# Patient Record
Sex: MALE | Race: WHITE | NOT HISPANIC OR LATINO | Employment: OTHER | URBAN - METROPOLITAN AREA
[De-identification: names, ages, dates, MRNs, and addresses within clinical notes are randomized per-mention and may not be internally consistent; named-entity substitution may affect disease eponyms.]

---

## 2017-03-01 ENCOUNTER — HOSPITAL ENCOUNTER (OUTPATIENT)
Dept: RADIOLOGY | Facility: HOSPITAL | Age: 67
Discharge: HOME | End: 2017-03-01
Attending: NURSE PRACTITIONER
Payer: MEDICARE

## 2017-03-01 ENCOUNTER — OFFICE VISIT (OUTPATIENT)
Dept: FAMILY MEDICINE | Facility: CLINIC | Age: 67
End: 2017-03-01
Payer: MEDICARE

## 2017-03-01 VITALS
WEIGHT: 171.5 LBS | HEIGHT: 72 IN | BODY MASS INDEX: 23.23 KG/M2 | DIASTOLIC BLOOD PRESSURE: 78 MMHG | HEART RATE: 82 BPM | SYSTOLIC BLOOD PRESSURE: 116 MMHG

## 2017-03-01 DIAGNOSIS — R14.0 BLOATING: ICD-10-CM

## 2017-03-01 DIAGNOSIS — R14.0 BLOATING: Primary | ICD-10-CM

## 2017-03-01 DIAGNOSIS — R10.9 ABDOMINAL PAIN, UNSPECIFIED LOCATION: ICD-10-CM

## 2017-03-01 LAB
BILIRUB UR QL STRIP: NEGATIVE
CLARITY UR: CLEAR
COLOR UR: YELLOW
GLUCOSE UR QL STRIP: NEGATIVE
HGB UR QL STRIP: NEGATIVE
KETONES UR QL STRIP: NEGATIVE
LEUKOCYTE ESTERASE UR QL STRIP: NEGATIVE
NITRITE UR QL STRIP: NEGATIVE
PH UR STRIP: 6 [PH] (ref 5–8)
PROT UR QL STRIP: NEGATIVE
SP GR UR STRIP: 1.02 (ref 1–1.03)
URN SPEC COLLECT METH UR: NORMAL

## 2017-03-01 PROCEDURE — 74020 XR ABDOMEN FLAT AND ERECT: CPT | Mod: TC,PO

## 2017-03-01 PROCEDURE — 99203 OFFICE O/P NEW LOW 30 MIN: CPT | Mod: S$PBB,,, | Performed by: NURSE PRACTITIONER

## 2017-03-01 PROCEDURE — 99999 PR PBB SHADOW E&M-NEW PATIENT-LVL III: CPT | Mod: PBBFAC,,, | Performed by: NURSE PRACTITIONER

## 2017-03-01 PROCEDURE — 74020 XR ABDOMEN FLAT AND ERECT: CPT | Mod: 26,,, | Performed by: RADIOLOGY

## 2017-03-01 RX ORDER — TAMSULOSIN HYDROCHLORIDE 0.4 MG/1
0.4 CAPSULE ORAL DAILY
COMMUNITY
End: 2017-04-24

## 2017-03-01 RX ORDER — GABAPENTIN 300 MG/1
300 CAPSULE ORAL 3 TIMES DAILY
COMMUNITY

## 2017-03-01 RX ORDER — TRAMADOL HYDROCHLORIDE 50 MG/1
50 TABLET ORAL EVERY 6 HOURS PRN
COMMUNITY
End: 2017-05-25 | Stop reason: ALTCHOICE

## 2017-03-01 RX ORDER — CLONAZEPAM 0.5 MG/1
0.5 TABLET ORAL 2 TIMES DAILY PRN
COMMUNITY

## 2017-03-01 NOTE — PROGRESS NOTES
Subjective:       Patient ID: Fernando Garg is a 66 y.o. male.    Chief Complaint: Bloated (for 2 weeks,pt states it is uncomfortable )    HPI Comments: Patient is a new patient [facility] clinic. [facility]. Patient does have hemochromocytosis, last phlebotomy was about 2 weeks ago. He also takes flomax, he thinks for BPH, and has neuropathy for which he takes gabapentin.   Patient says that he has noticed significant abdominal bloating x 2 weeks. Having constipation with intermittent diarrhea, no nausea or vomiting, pain and bloating does get a little worse after eating, he has no history of any liver issues. Very mild pain 2-3/10. He has no previous history of any  gastric issues. He has had a hernia repair.  There are no preventive care reminders to display for this patient.    Past Medical History:  Past Medical History:  No date: BPH (benign prostatic hyperplasia)  No date: Hemochromatosis  No date: Neuropathy  Past Surgical History:  No date: HERNIA REPAIR  Review of patient's allergies indicates:   -- Sulfa (sulfonamide antibiotics) -- Nausea And Vomiting  No current outpatient prescriptions on file prior to visit.  No current facility-administered medications on file prior to visit.     Social History    Marital status: Single              Spouse name:                       Years of education:                 Number of children:               Occupational History    None on file    Social History Main Topics    Smoking status: Former Smoker                                                                Packs/day: 0.00      Years: 0.00           Smokeless status: Not on file                       Comment: quit many years ago    Alcohol use: No                 Comment: quit 5 months ago    Drug use: No              Sexual activity: Not on file          Other Topics            Concern    None on file    Social History Narrative    None on file      Review of  patient's family history indicates:    Breast cancer                  Mother                    Lung cancer                    Mother                    Stroke                         Father                          Review of Systems   Constitutional: Negative.  Negative for chills, diaphoresis and fever.   HENT: Negative.    Respiratory: Negative for cough and shortness of breath.    Cardiovascular: Negative for chest pain, palpitations and leg swelling.   Gastrointestinal: Positive for abdominal distention, abdominal pain, constipation and diarrhea. Negative for anal bleeding, blood in stool, nausea, rectal pain and vomiting.   Genitourinary: Positive for frequency. Negative for dysuria and genital sores.   Musculoskeletal: Negative for arthralgias and back pain.   Skin: Positive for color change. Negative for rash.   Neurological: Negative for dizziness, light-headedness and headaches.       Objective:      Physical Exam   Constitutional: He is oriented to person, place, and time. No distress.   HENT:   Head: Normocephalic and atraumatic.   Eyes: Pupils are equal, round, and reactive to light.   Neck: Normal range of motion.   Cardiovascular: Normal rate and regular rhythm.  Exam reveals no friction rub.    No murmur heard.  Pulmonary/Chest: Effort normal and breath sounds normal. No respiratory distress. He has no wheezes.   Abdominal: He exhibits distension. There is no tenderness.   Musculoskeletal: He exhibits no edema.   Neurological: He is alert and oriented to person, place, and time.   Skin: He is not diaphoretic. No erythema.        Psychiatric: He has a normal mood and affect. His behavior is normal.   Vitals reviewed.      Assessment:       1. Bloating    2. Abdominal pain, unspecified location        Plan:     Patient appears mildly jaundiced. He says he does not notice a difference in his color. SBO vs Liver disorder, possibly relating to hemochromocytosis. Labs, xray, and us today to evaluate  symptoms.   1. Bloating    - CBC auto differential; Future  - Comprehensive metabolic panel; Future  - Amylase; Future  - Lipase; Future  - X-Ray Abdomen Flat And Erect; Future  - US Abdomen Complete; Future  - Urinalysis    2. Abdominal pain, unspecified location    - CBC auto differential; Future  - Comprehensive metabolic panel; Future  - Amylase; Future  - Lipase; Future  - X-Ray Abdomen Flat And Erect; Future  - US Abdomen Complete; Future  - Urinalysis

## 2017-03-06 ENCOUNTER — HOSPITAL ENCOUNTER (OUTPATIENT)
Dept: RADIOLOGY | Facility: HOSPITAL | Age: 67
Discharge: HOME | End: 2017-03-06
Attending: NURSE PRACTITIONER
Payer: MEDICARE

## 2017-03-06 DIAGNOSIS — K74.60 CIRRHOSIS OF LIVER WITH ASCITES, UNSPECIFIED HEPATIC CIRRHOSIS TYPE: ICD-10-CM

## 2017-03-06 DIAGNOSIS — R18.8 CIRRHOSIS OF LIVER WITH ASCITES, UNSPECIFIED HEPATIC CIRRHOSIS TYPE: ICD-10-CM

## 2017-03-06 DIAGNOSIS — R10.9 ABDOMINAL PAIN, UNSPECIFIED LOCATION: ICD-10-CM

## 2017-03-06 DIAGNOSIS — R18.8 OTHER ASCITES: Primary | ICD-10-CM

## 2017-03-06 DIAGNOSIS — R14.0 BLOATING: ICD-10-CM

## 2017-03-06 PROCEDURE — 76700 US EXAM ABDOM COMPLETE: CPT | Mod: TC,PO

## 2017-03-06 PROCEDURE — 76700 US EXAM ABDOM COMPLETE: CPT | Mod: 26,,, | Performed by: RADIOLOGY

## 2017-03-07 ENCOUNTER — DOCUMENTATION ONLY (OUTPATIENT)
Dept: TRANSPLANT | Facility: CLINIC | Age: 67
End: 2017-03-07

## 2017-03-07 NOTE — LETTER
March 7, 2017    Soheila Loo, [place]  [address] [facility] [address]      Dear Dr. Loo    Patient: Fernando Garg   MR Number: [id number]   YOB: 1950     Thank you for the referral of Fernando Garg to the [facility]. An initial appointment will be scheduled for your patient with one of our Hepatologists.      Thank you again for your trust in our program.  If there is anything we can do for you or your staff, please feel free to contact us.        Sincerely,        [facility]  [address] [place] [id number]  [phone]

## 2017-03-07 NOTE — LETTER
March 7, 2017    CJ Garg  [address] [place] [address]      Dear CJ Garg:    Your doctor has referred you to the [facility]. You will be contacted by our office and an initial appointment will then be scheduled for you.    We look forward to seeing you soon. If you have any further questions, please contact us at [phone].       Sincerely,        [facility]   [address] [place] [id number]  [phone]

## 2017-03-07 NOTE — NURSING
Pt records reviewed.   Pt will be referred to Hepatology.    Initial referral received  from the workque.   Referring Provider/diagnosis      Soheila Loo NP    Diagnosis: Other ascites  Cirrhosis of liver with ascites, unspecified hepatic cirrhosis type   [facility]. [facility]      Referral letter sent to provider and patient.

## 2017-03-08 ENCOUNTER — TELEPHONE (OUTPATIENT)
Dept: HEPATOLOGY | Facility: CLINIC | Age: 67
End: 2017-03-08

## 2017-03-08 ENCOUNTER — HOSPITAL ENCOUNTER (OUTPATIENT)
Dept: RADIOLOGY | Facility: HOSPITAL | Age: 67
Discharge: HOME | End: 2017-03-08
Attending: NURSE PRACTITIONER
Payer: MEDICARE

## 2017-03-08 ENCOUNTER — TELEPHONE (OUTPATIENT)
Dept: INTERVENTIONAL RADIOLOGY/VASCULAR | Facility: HOSPITAL | Age: 67
End: 2017-03-08

## 2017-03-08 ENCOUNTER — OFFICE VISIT (OUTPATIENT)
Dept: HEPATOLOGY | Facility: CLINIC | Age: 67
End: 2017-03-08
Payer: MEDICARE

## 2017-03-08 VITALS
TEMPERATURE: 98 F | HEIGHT: 72 IN | HEART RATE: 81 BPM | RESPIRATION RATE: 18 BRPM | SYSTOLIC BLOOD PRESSURE: 142 MMHG | WEIGHT: 167.13 LBS | DIASTOLIC BLOOD PRESSURE: 83 MMHG | OXYGEN SATURATION: 100 % | BODY MASS INDEX: 22.64 KG/M2

## 2017-03-08 DIAGNOSIS — D61.818 PANCYTOPENIA: ICD-10-CM

## 2017-03-08 DIAGNOSIS — K74.60 CIRRHOSIS OF LIVER WITHOUT ASCITES, UNSPECIFIED HEPATIC CIRRHOSIS TYPE: ICD-10-CM

## 2017-03-08 DIAGNOSIS — R16.1 SPLENOMEGALY: ICD-10-CM

## 2017-03-08 DIAGNOSIS — R19.7 DIARRHEA, UNSPECIFIED TYPE: ICD-10-CM

## 2017-03-08 DIAGNOSIS — E83.119 HEMOCHROMATOSIS, UNSPECIFIED HEMOCHROMATOSIS TYPE: ICD-10-CM

## 2017-03-08 DIAGNOSIS — K74.60 CIRRHOSIS OF LIVER WITH ASCITES, UNSPECIFIED HEPATIC CIRRHOSIS TYPE: ICD-10-CM

## 2017-03-08 DIAGNOSIS — R18.8 CIRRHOSIS OF LIVER WITH ASCITES, UNSPECIFIED HEPATIC CIRRHOSIS TYPE: ICD-10-CM

## 2017-03-08 DIAGNOSIS — R17 SERUM TOTAL BILIRUBIN ELEVATED: ICD-10-CM

## 2017-03-08 DIAGNOSIS — R18.8 OTHER ASCITES: Primary | ICD-10-CM

## 2017-03-08 DIAGNOSIS — R18.8 OTHER ASCITES: ICD-10-CM

## 2017-03-08 DIAGNOSIS — D69.6 THROMBOCYTOPENIA: ICD-10-CM

## 2017-03-08 PROCEDURE — 74160 CT ABDOMEN W/CONTRAST: CPT | Mod: TC

## 2017-03-08 PROCEDURE — 99215 OFFICE O/P EST HI 40 MIN: CPT | Mod: S$PBB,,, | Performed by: NURSE PRACTITIONER

## 2017-03-08 PROCEDURE — 74160 CT ABDOMEN W/CONTRAST: CPT | Mod: 26,GC,, | Performed by: RADIOLOGY

## 2017-03-08 PROCEDURE — 99999 PR PBB SHADOW E&M-EST. PATIENT-LVL IV: CPT | Mod: PBBFAC,,, | Performed by: NURSE PRACTITIONER

## 2017-03-08 PROCEDURE — 25500020 PHARM REV CODE 255: Performed by: NURSE PRACTITIONER

## 2017-03-08 RX ADMIN — IOHEXOL 75 ML: 350 INJECTION, SOLUTION INTRAVENOUS at 02:03

## 2017-03-08 NOTE — NURSING
3/8/17@4275 pre op call with pt's significant other, Kenia Mac, instructed to inform pt to eat a light breakfast and take regularly scheduled meds, Ms. Mac acknowledged understanding and states she will accompany pt in the am for procedure

## 2017-03-08 NOTE — PROGRESS NOTES
[facility] VISIT NEW PT NOTE    REFERRING PROVIDER: Soheila Loo NP    CHIEF COMPLAINT: abnormal liver imaging    HPI: This is a 66 y.o. White male with PMH of BPH, neuropathy, and hemochromatosis referred for abnormal findings on recent u/s that are concerning for cirrhosis. [facility] but went to primary care recently on the [facility] for f/o abd bloating and pain. He had u/s that revealed findings of cirrhosis with nodular liver, cirrhosis, enlarged portal vein, and splenomegaly. Ascites noted. He had labs also done that showed elevated Tbili of 2.5, AST 60, ALT 28. Pancytopenia on CBC. Plts low at 84. He has been referred for further evaluation. He is here alone today. He reports his hemochromatosis was diagnosed 3-[facility]rs [facility] and has been managed by a hematologist. He has phlebotomy Q 2 weeks, last yesterday. Thinks ferritin was 1300. Got down to 900 at one time. He denies any family h/o hemochromatosis. Father  from a stroke and mother had lung and breast cancer. He does have one sister. She has not been tested for this.     He reports he [facility] and was told he had a fatty liver but never had any liver biopsy or other workup done. He does report h/o alcohol use in the past, 3-4 beers a few times a week at the most. Stopped drinking a year ago. He denies any h/o daily or heavy alcohol use.     He recalls swelling started 3-4 weeks ago. He has a niece that was diagnosed with C-diff after they had a family get together. He had a little diarrhea but then this resolved. He thinks swelling started around the same time. He has not had stool tested. He feels well. Denies jaundice, dark urine, hematemesis, melena, slowed mentation. Weight has been stable. Energy level is good. He feels well overall and is active. Still does construction projects around the house.        Review of patient's allergies indicates:   Allergen Reactions    Sulfa (sulfonamide  antibiotics) Nausea And Vomiting       Medications reviewed in Epic    PMHX:  has a past medical history of BPH (benign prostatic hyperplasia); Hemochromatosis; and Neuropathy.    PSHX:  has a past surgical history that includes Hernia repair and Colonoscopy (2012).    FAMILY HISTORY: Negative for liver disease, reviewed in [facility]    SOCIAL HISTORY:   History   Smoking Status    Former Smoker   Smokeless Tobacco    Not on file     Comment: quit many years ago       History   Alcohol Use No     Comment: quit 5 months ago       History   Drug Use No     He has a long term girlfriend of 20 yrs. They are planning to get  on a cruise to [place] they are taking in May. 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    ROS:   GENERAL: Denies fever, chills, weight loss/gain, fatigue  HEENT: Denies headaches, dizziness, vision/hearing changes, (+) hearing loss  CARDIOVASCULAR: Denies chest pain, palpitations, or edema  RESPIRATORY: Denies dyspnea, cough  GI: Denies abdominal pain, rectal bleeding, nausea, vomiting. No change in bowel pattern or color  : Denies dysuria, hematuria   SKIN: Denies rash, itching   NEURO: Denies confusion, memory loss, or mood changes  PSYCH: Denies depression or anxiety  HEME/LYMPH: Denies easy bruising or bleeding        PHYSICAL EXAM:   Friendly White male, in no acute distress; alert and oriented to person, place and time  VITALS: BP (!) 142/83 (BP Location: Left arm, Patient Position: Sitting, BP Method: Automatic)  Pulse 81  Temp 97.7 °F (36.5 °C)  Resp 18  Ht 6' (1.829 m)  Wt 75.8 kg (167 lb 1.7 oz)  SpO2 100%  BMI 22.66 kg/m2  HENT: Normocephalic, without obvious abnormality. Oral mucosa pink and moist. Dentition good.  EYES: Sclerae mildly icteric. No conjunctival pallor.   NECK: Supple. No masses or cervical adenopathy.  CARDIOVASCULAR: Regular rate and rhythm. No murmurs.  RESPIRATORY: Normal respiratory effort. BBS CTA. No wheezes or crackles.  GI: Soft,  non-tender, mildly distended. (+) hepatosplenomegaly. No masses palpable. (+) ascites.  EXTREMITIES:  No clubbing, cyanosis or edema.  SKIN: Warm and dry. No jaundice. (+) dark skin tone. No rashes noted to exposed skin. No telangectasias noted. (+) palmar erythema.  NEURO:  Normal gate. No asterixis.  PSYCH:  Memory intact. Thought and speech pattern appropriate. Behavior normal. No depression or anxiety noted.      RECENT LABS:    Labs:  Lab Results   Component Value Date    WBC 2.75 (L) 03/01/2017    HGB 13.8 (L) 03/01/2017    HCT 39.0 (L) 03/01/2017    PLT 84 (L) 03/01/2017     (L) 03/01/2017    K 4.1 03/01/2017    CREATININE 0.9 03/01/2017    ALT 28 03/01/2017    AST 60 (H) 03/01/2017    ALKPHOS 103 03/01/2017    BILITOT 2.5 (H) 03/01/2017    ALBUMIN 3.0 (L) 03/01/2017         DIAGNOSTIC STUDIES:  EGD- none recently, last 5 yrs ago    [facility] no [facility]ps per pt, repeat in 10 yrs advised    ABD. U/S- 3/6/17  Findings:    The pancreas is obscured by bowel gas.  The proximal and mid abdominal aorta are obscured by bowel gas.  The distal infrarenal abdominal aorta is nonaneurysmal.    The liver is normal in size but shows a coarse parenchymal echotexture and a micronodular contour suggesting intrinsic liver disease such as hepatic cirrhosis.  Please correlate clinically.  No discrete hepatic mass appreciated.  The portal vein is patent and shows normal directional flow.  The portal vein is enlarged measuring up to 16 mm in maximal dimension suggesting portal venous hypertension.  There is a moderate volume of intra-abdominal ascites present, most pronounced about the liver area in this likely explains diffuse gallbladder wall thickening.  No sonographic Whiteside's sign.  No shadowing gallstones.  There is gallbladder sludge present within the gallbladder lumen.  No intra-or extrahepatic biliary dilatation, and the common bile duct measures 5 mm.  The kidneys are unremarkable.  The spleen is at  the upper limits of normal in size measuring up to 12.6 cm in maximal dimension.   Impression       1.  Sonographic findings which are concerning for hepatic cirrhosis and portal venous hypertension as detailed above.  No discrete hepatic mass appreciated.  Please correlate with liver function tests and consider consultation with a hepatologist.    2.  Ascites         ASSESSMENT:  66 y.o. White male with:  1.  Cirrhosis, decompensated with ascites, diagnosed based on imaging and lab findings of nodular liver, splenomegaly, ascites, elevated Tbili, thrombocytopenia  -- MELD - unable to calculate, no INR  -- HCC screening- AFP and abd. U/S - no mass on u/s, no AFP to review  -- Immunity to Hep A and B- unknown  -- EGD - none recently  2. Hemochromatosis  -- [facility] Q 2 weeks  -- managed by hematologist there  3. Ascites, new onset  -- will get diagnostic and therapeutic paracentesis and start diuretics  4. Possible exposure to C-diff  -- will get stool sample  5. Pancytopenia      EDUCATION:   The disease process and manifestations of cirrhosis were discussed.    Signs and symptoms of hepatic decompensation were reviewed, including jaundice, ascites, and slowed mentation due to hepatic encephalopathy. The patient should seek medical attention if any of these things occur.  We discussed the potential for bleeding from esophageal varices with symptoms of hematemesis and melena. The patient should report to the Emergency Department for these symptoms.    We discussed the increased risk of hepatocellular carcinoma due to cirrhosis. Continued screening every six months with ultrasound and AFP is recommended.     No alcohol or raw seafood.  Tylenol/acetaminophen as needed for pain, up to 2000 mg daily    Discussed that hemochromatosis can cause cirrhosis and that is likely the cause of his cirrhosis. He may need to be evaluated for liver transplant pending labs today.        PLAN:  1. Labs today for full serologic workup, MELD, HH DNA analysis  2. Paracentesis, diagnostic and therapeutic  3. Will start diuretics after he has para tomorrow  4. Low salt diet, < 2000 mg daily  5. EGD to screen for varices  6. [facility]. Will take this over soon  7. TPCT scan to ensure no HCC present  8. HCC screening Q 6 months with AFP and abd. U/S   9. Continue to avoid alcohol   10. Tylenol/acetaminophen as needed for pain, up to 2000 mg daily, Avoid NSAIDS   11. May need to be referred for transplant evaluation if MELD > 15  12. Follow up in 4 weeks      Thank you for allowing me to participate in the care of Fernando Garg    Nasima Ro, NP-C    Total duration of visit = 75 min, with > 50% spent counseling     CC: Dr. Kawasaki, PCP

## 2017-03-08 NOTE — MR AVS SNAPSHOT
[facility] - Hepatology  [address]  [facility] [address]  Phone: [phone]  Fax: [phone]                  Fernando Garg   3/8/2017 8:40 AM   Office Visit    Description:  Male : 1950   Provider:  Nasima Ro NP   Department:  [facility] Hepat[facility]           Reason for Visit     Abnormal Abdominal/Liver Imaging           Diagnoses this Visit        Comments    Other ascites    -  Primary     Cirrhosis of liver without ascites, unspecified hepatic cirrhosis type         Diarrhea, unspecified type                To Do List           Future Appointments        Provider Department Dept Phone    3/9/2017 8:00 AM [id number] [phone]      Goals (5 Years of Data)     None      [facility]     [facility] Care Line -  Assistance  Registered nurses in the [facility] provide clinical advisement, health education, appointment booking, and other advisory services.  Call for this free service at [phone].             Medications           Message regarding Medications     Verify the changes and/or additions to your medication regime listed below are the same as discussed with your clinician today.  If any of these changes or additions are incorrect, please notify your healthcare provider.             Verify that the below list of medications is an accurate representation of the medications you are currently taking.  If none reported, the list may be blank. If incorrect, please contact your healthcare provider. Carry this list with you in case of emergency.           Current Medications     gabapentin (NEURONTIN) 300 MG capsule Take 300 mg by mouth 3 (three) times daily.    tramadol (ULTRAM) 50 mg tablet Take 50 mg by mouth every 6 (six) hours as needed for Pain.    clonazePAM (KLONOPIN) 0.5 MG tablet Take 0.5 mg by mouth 2 (two) times daily as needed for Anxiety.    tamsulosin (FLOMAX) 0.4 mg Cp24 Take 0.4 mg by  mouth once daily.           Clinical Reference Information           Your Vitals Were     BP Pulse Temp Resp Height Weight    142/83 (BP Location: Left arm, Patient Position: Sitting, BP Method: Automatic) 81 97.7 °F (36.5 °C) 18 6' (1.829 m) 75.8 kg (167 lb 1.7 oz)    SpO2 BMI             100% 22.66 kg/m2         Blood Pressure          Most Recent Value    BP  (!)  142/83      Allergies as of 3/8/2017     Sulfa (Sulfonamide Antibiotics)      Immunizations Administered on Date of Encounter - 3/8/2017     None      Orders Placed During Today's Visit      Normal Orders This Visit    Culture, Body Fluid (Aerobic) w/ GS     Cytology Specimen-Medical Cytology (Fluid/[place]ash/[place])     Future Labs/Procedures Expected by Expires    Albumin, Peritoneal, Pleural Fluid or VICKY Drainage, In-House Ascites  3/8/2017 5/7/2018    TUAN  3/8/2017 5/7/2018    Clostridium difficile EIA  3/8/2017 5/7/2018    Hepatitis A antibody, IgG  3/8/2017 5/7/2018    Hepatitis C antibody  3/8/2017 5/7/2018    IR Paracentesis with Imaging  3/8/2017 3/8/2018    Phosphatidylethanol (PETH)  3/8/2017 5/7/2018    Protein, Peritoneal, Pleural Fluid or VICKY Drainage, In-House Ascites  3/8/2017 5/7/2018    WBC & Diff,Body Fluid Ascites  3/8/2017 5/7/2018    AFP tumor marker  As directed 3/8/2018    Alpha 1 Antitrypsin Phenotype  As directed 3/8/2018    Anti-smooth muscle antibody  As directed 3/8/2018    Antimitochondrial antibody  As directed 3/8/2018    CBC auto differential  As directed 3/8/2018    Ceruloplasmin  As directed 3/8/2018    Comprehensive metabolic panel  As directed 3/8/2018    CT Abdomen With Without Contrast  As directed 3/8/2018    Ferritin  As directed 3/8/2018    Hemochromatosis DNA Analysis (PCR)  As directed 3/8/2018    Hepatitis B core antibody, total  As directed 3/8/2018    Hepatitis B surface antibody  As directed 3/8/2018    Hepatitis B surface antigen  As directed 3/8/2018    IgG  As directed 3/8/2018    IgM  As directed 3/8/2018     Iron and TIBC  As directed 3/8/2018    Protime-INR  As directed 3/8/2018      [facility] Sign-Up     Activating your [facility] account is as easy as 1-2-3!     1) Visit [email or web address], select Sign Up Now, enter this activation code and your date of birth, then select Next.  [email or web address]  Expires: 4/22/2017  9:50 AM      2) Create a username and password to use when you visit [facility] in the future and select a security question in case you lose your password and select Next.    3) Enter your e-mail address and click Sign Up!    Additional Information  If you have questions, please e-mail [email or web address] or call [phone] to talk to our [facility] staff. Remember, [facility] is NOT to be used for urgent needs. For medical emergencies, dial 911.         Instructions    1. Labs today  2. Paracentesis to drain fluid  3. Will start fluid pills to help with fluid also  4. Low salt diet, < 2000 mg daily  5. EGD to check for varices, call [phone] to schedule  6. [facility]  [facility] scan  8. Will need liver cancer screenings every 6 months  9. No alcohol or raw seafood.   10. Tylenol/acetaminophen as needed for pain, up to 2000 mg daily   11. Follow up in 4 weeks      Cirrhosis    The liver is found on the right side of your abdomen, just below the rib cage. The liver has many essential functions. Among these, it filters toxins from the blood. It also helps blood clot to stop bleeding. Cirrhosis results from scarring and injury to the liver. This damage is permanent. It can lead to loss of liver function. At some point, the liver may stop working (liver failure).   Long-term heavy alcohol use and having hepatitis B or C are the two most common causes of cirrhosis. Other things that can damage the liver include toxins, certain medicines, and certain viruses.  Common symptoms of cirrhosis include:  · Tiredness, weakness  · Loss of appetite  · Nausea and vomiting  · Easy bleeding and  bruising  · Abdominal swelling  · Weight loss  · Jaundice  · Itching  · Confusion  Treatment is aimed at managing symptoms and preventing further liver damage. Treatments may be given to fight the hepatitis virus. Quitting alcohol will help slow the progress of the disease and may prevent further complications. If cirrhosis progresses and becomes life threatening, a liver transplant may be an option in some cases.   Home care  · Avoid medicines that can worsen liver damage.  Your healthcare provider will explain if any of the medicines you now take need to be changed. Talk to you healthcare provider before taking any medicine, including mineral and vitamin supplements or herbs. Certain substances can worsen liver damage.  · Talk to your healthcare provider avoiding medicines containing acetaminophen or NSAIDs (such as ibuprofen and naproxen). These can affect your liver.   · Stop drinking alcohol. If you are dependent on alcohol or find it hard to stop drinking, seek professional help. Consider joining Alcoholics Anonymous or another type of treatment program for support.  · If you use IV drugs, you are at high risk for hepatitis B and C. Seek help to stop.   Follow-up care  Follow up with your healthcare provider or as advised by our staff.  For more information and to learn about support groups for people with liver disease, contact:  · American Liver Foundation  www.liverfoundation.org  769.270.2102  · Hepatitis Foundation International  www.hepfi.org  663- 368-4196  When to seek medical advice  Call your healthcare provider for any of the following:  · Rapid weight gain with increased size of your abdomen or leg swelling  · Increasing jaundice (yellow color of skin or eyes)  · Excess bleeding from cuts or injuries  Date Last Reviewed: 6/22/2015 © 2000-2016 [employer]. [address] [place] [address], [place] [address]. All rights reserved. This information is not intended as a substitute for professional  medical care. Always follow your healthcare professional's instructions.        Treating Cirrhosis  Cirrhosis is a condition where the liver is damaged. Scar tissue slowly replaces healthy tissue. Treatment can control or slow liver scarring. Follow your healthcare providers instructions closely to get the most out of your treatment. And ask your family and friends for support.  Making a treatment plan  You and your healthcare provider will decide on a treatment plan thats best for you. The plan may include one or more of the following:  · Avoiding alcohol. Heavy alcohol use can damage the liver. Once the liver is damaged, even a small amount of alcohol can cause problems. You can slow down the progression of cirrhosis if you stop all alcohol use.   · Medicines. These may be given to treat some causes of cirrhosis, such as infection or a bile duct blockage. If needed, medicine may be used to improve blood clotting. And medicine can be given if your immune system is attacking the liver or bile ducts.  · Other medicines should be avoided in cirrhosis. These are NSAIDs, such as ibuprofen, naproxen, and similar. They can hurt the kidney in cirrhosis.   · Treating symptoms. Cirrhosis can cause swelling in the stomach and legs. A low-salt diet can help relieve this symptom. So can taking water pills (diuretics).  · Eating healthy foods  · Losing excess weight. If you have metabolic problems like being overweight, diabetes, high blood pressure, or high cholesterol and triglycerides, if you improve those diseases, you can also slow down the progression of cirrhosis. Exercise is very important.   · Removal of iron from the blood to decrease iron levels in liver tissue if these levels are high.  Severe cases of cirrhosis may need special treatments. Your healthcare provider can discuss them with you.  Avoiding alcohol  Alcohol use can destroy liver cells. If you have problems quitting alcohol, get the support you need.  Your healthcare provider may be able to suggest local groups that can help you stop drinking alcohol.   Date Last Reviewed: 6/1/2016 © 2000-2016 [employer]. [address] [place] [address], [place] [address]. All rights reserved. This information is not intended as a substitute for professional medical care. Always follow your healthcare professional's instructions.        Understanding Cirrhosis    Cirrhosis is a chronic (lifelong) liver problem. It results from damaged and scarred liver tissue. Cirrhosis cant be cured. But it can be treated. Your healthcare provider can tell you more.  The liver  The liver is a large organ in the upper right part of the belly. A healthy liver metabolizes proteins, carbohydrates, and fats. It makes a digestive fluid called bile and removes toxins from the blood. The liver is also involved in the blood-clotting process.  When you have cirrhosis  When you have cirrhosis, your liver becomes damaged and scarred. The liver doesnt function as it should. In some cases, cirrhosis can lead to liver failure. If it does, your healthcare provider will tell you whether you may need a liver transplant. You can slow down the progression of cirrhosis if you stop all alcohol use. Also, if you have metabolic problems like being overweight, diabetes, high blood pressure, or high cholesterol and triglycerides, you can also slow down the progression of cirrhosis by trying to improve those diseases.   Causes of cirrhosis  Cirrhosis causes include the following:  · Alcohol use  · Viral liver infections, such as hepatitis  · Chronic bile duct blockage  · Certain inherited diseases that can result in too much copper or iron being stored in the liver  · Certain medicines  · Nonalcoholic fatty liver disease  · Autoimmune disease  Common signs and symptoms  Typical cirrhosis signs and symptoms include the following:  · Fatigue, weakness, and lack of appetite  · Vomiting with or without  blood  · Weight loss or weight gain  · Yellowish skin and eyes (jaundice)  · Itching  · Swollen belly and legs  · Intestinal bleeding  · Easy bruising of the skin  · Dilated veins in the esophagus and stomach  · Poor mental function  Date Last Reviewed: 6/1/2016 © 2000-2016 [employer]. [address] [place] [address]. All rights reserved. This information is not intended as a substitute for professional medical care. Always follow your healthcare professional's instructions.        Ascites    Ascites is fluid collecting in the abdomen (stomach area). Symptoms include swelling of the abdomen and a feeling of pressure. Shortness of breath may also occur. In severe cases, the feet, ankles and legs may also swell.   There are many causes of ascites. The most common are related to the liver. They include:  · Long-term alcohol abuse  · Hepatitis  · Diseases such as congestive heart failure, kidney failure, pancreatitis, or cancer  To treat the condition, a low-salt diet may be recommended. Medicines that help fluid leave the body (diuretics) may be prescribed. In some cases, a procedure is done to drain the abdomen of fluid. This is called paracentesis. Unless the underlying cause is treated, the fluid is likely to return.  If liver damage is due to alcohol, stopping all alcohol will help slow the progress of the disease. If liver damage is from hepatitis B or C, treatments may be given to fight the virus. If liver damage becomes life threatening, a liver transplant may be needed.  Home care  · Certain medicines can worsen liver damage. Talk to your healthcare provider or pharmacist about any medicines you currently take. Ask your healthcare provider or pharmacist before taking any new medicines. Also ask before taking herbs, vitamins, or minerals. Certain ones affect the liver.  · Do not taking acetaminophen or ibuprofen without taking to your healthcare provider first. Both can affect your  liver.   · Stop all alcohol use. If you abuse alcohol, talk to your healthcare provider about getting help and support to stop.   · If you use IV drugs, seek help to stop. Never share needles or other equipment.    Follow-up care  Follow up with your healthcare provider as advised. If a culture was done, call as directed for the results. Depending on the results, your treatment may change.  The following sources can tell you more about ascites and help you find support.  · American Liver Foundation 978-211-0097 www.liverfoundation.org  · Hepatitis Foundation International www.hepfi.org  · Alcoholics Anonymous www.aa.org  · National [place] on Alcoholism and Drug Dependence 694-527-6942 www.ncadd.org  When to seek medical advice  Call your healthcare provider right away if you have any of the following:  · Sudden weight gain with increased size of your abdomen or leg swelling  · Increasing jaundice (yellowing of skin or eyes)  · Excess bleeding from cuts or injuries  · Blood in vomit or stool (black or red color)  · Trouble breathing  · Increasing abdominal pain  · Fever of 100.4ºF (38ºC) or higher, or as directed by your healthcare provider  Date Last Reviewed: 6/16/2015  © 9852-5946 [employer]. [address] [place] [address], [place] [address]. All rights reserved. This information is not intended as a substitute for professional medical care. Always follow your healthcare professional's instructions.        Esophageal Varices     With esophageal varices, blood vessels in the esophagus become abnormally enlarged. They may then burst (rupture) and bleed.   Esophageal varices are enlarged veins at the lower end of the esophagus. The esophagus is the tube that carries food from your mouth to your stomach. Varices most often occur because of problems with blood flow in the liver caused by chronic liver disease. Normally, a blood vessel called the portal vein carries blood from the digestive organs to the liver.  But with liver disease, blood flow can be blocked due to scarring of the liver. This increases the blood pressure in the portal vein (a condition known as portal hypertension). Blood then backs up in nearby veins in the esophagus and stomach, causing varices. Varices are a serious and deadly problem. Treatment is needed to prevent them from bursting (rupturing) and bleeding. If bleeding occurs, it can be fatal.  Symptoms of esophageal varices  Symptoms do not occur unless the varices are bleeding. This is an emergency problem. If you have any of the following symptoms, get medical attention right away:  · Vomiting blood  · Black, tarry, or bloody stools  · Feeling lightheaded, or fainting (loss of consciousness)  Diagnosing esophageal \varices  Youll likely be checked for varices if you have liver disease or other health problems that can cause them. Your healthcare provider will ask about your symptoms and health history. Youll also be examined. Tests are then done to confirm the problem. Tests can include:  · Upper endoscopy. This is done to see inside the upper digestive tract. During the test, an endoscope is used. This is a thin, flexible tube with a tiny camera on the end. Its inserted through your mouth. Its then guided down through your esophagus, stomach, and first part of your small intestine. This allows the provider to check for varices and find any bleeding.  · Imaging tests. These provide pictures of the liver or blood flow in the liver. They allow the provider to check for enlarged veins around the liver and assess the risk of bleeding. Common imaging tests done include ultrasound and CT scans.  Treating esophageal varices  The goal of treatment is to reduce the risk of bleeding or to control bleeding. Treatment can include 1 or more of the following:  · Medicines. These may be prescribed to lower the blood pressure inside the enlarged veins. This reduces the risk of bleeding. Beta-blockers are  the most common medicine used.  · Endoscopic therapy. These are treatments for enlarged or bleeding veins that are done using an endoscope. With ligation, small rubber bands are placed around the veins to close them off and stop any bleeding. With sclerotherapy, a blood-clotting medicine is injected into the veins to cause scarring and shrink them.  · Balloon tamponade. A tube with a balloon is guided down into your esophagus and stomach. The balloon is then filled with air. This puts pressure on enlarged or bleeding veins to control bleeding. This is a short-term (temporary) way to control bleeding until other treatments are available.   · Surgery. This may be done to place a tubelike device (stent) in the liver. The stent helps redirect blood flow in the liver to lower the blood pressure in enlarged veins. Sometimes, the enlarged veins may be connected to other nearby veins to redirect blood flow. In severe cases, a liver transplant may be needed. For this surgery, a diseased liver is replaced with a healthy liver from another person.   Follow-up  Regular visits with your provider are needed to check for bleeding of the varices. If bleeding occurs, it is likely to occur again. More treatments will then be needed in the future. Once endoscopic therapy (banding) is performed, regular follow-up endoscopic scans with banding are done to completely get rid of the varices. If you are given medicines to take by mouth, be sure to take them as directed. Work closely with your provider to manage your condition. Know when to seek emergency care.  Date Last Reviewed: 7/1/2016  © 1242-9780 [employer]. [address] [place] [address], [place] [address]. All rights reserved. This information is not intended as a substitute for professional medical care. Always follow your healthcare professional's instructions.        Discharge Instructions for Hereditary Hemochromatosis  You have been diagnosed with hereditary  hemochromatosis (HH). This is an inherited disease that causes you to soak up too much iron. Iron is needed for making red blood cells. But too much of it can cause serious health problems. Here's what you need to know.  Home care  · Tell your children and your brothers and sisters that you have hemochromatosis. The disease is inherited, so other family members may have it and not know it. Your first degree family members should talk to their health care provider about the need for blood testing.  · Have your iron levels checked regularly.  · Avoid drinking alcohol. If you have trouble quitting, ask your health care provider about programs to help you.  · Avoid eating large quantities of iron-rich foods, such as red meats (especially liver) and food products with iron added.  · Dont eat raw fish or raw shellfish.  · Never take iron supplements. Even small amounts of iron in some multivitamins can be harmful.  · Dont take pills with more than 500 mg of vitamin C each day. Its OK to eat foods that have vitamin C.  Follow-up  · Make a follow-up appointment.  · Keep your follow-up appointments. You may need to have a pint of blood removed (phlebotomy) on a regular basis to keep your iron levels normal.     When to call your healthcare provider  Call your healthcare provider right away if you have any of the following:  · Tiredness  · Irregular pulse or heartbeat; any chest pain  · Loss of appetite, nausea, or vomiting  · Difficulty breathing or exercising  · Increased thirst or increased need to urinate  · Fever of 100.4°F (38°C) or higher, or as directed by your healthcare provider  · Muscle aches, joint pains, or pain in your belly  · Darkened skin for no apparent reason  · Yellowing of the skin or whites of the eyes (jaundice)   Date Last Reviewed: 4/29/2015  © 6184-0912 [employer]. [address] [place] [address], [place] [address]. All rights reserved. This information is not intended as a substitute for  professional medical care. Always follow your healthcare professional's instructions.             Language Assistance Services     ATTENTION: Language assistance services are available, free of charge. Please call [phone].      ATENCIÓN: Si habla kalyan, tiene a moreno disposición servicios gratuitos de asistencia lingüística. Llame al [phone].     CHÚ Ý: N?u b?n nói Ti?ng Vi?t, có các d?ch v? h? tr? ngôn ng? mi?n phí dành cho b?n. G?i s? [phone].         [facility] complies with applicable Federal civil rights laws and does not discriminate on the basis of race, color, national origin, age, disability, or sex.

## 2017-03-08 NOTE — PATIENT INSTRUCTIONS
1. Labs today  2. Paracentesis to drain fluid  3. Will start fluid pills to help with fluid also  4. Low salt diet, < 2000 mg daily  5. EGD to check for varices, call [phone] to schedule  6. [facility]  7. CT scan  8. Will need liver cancer screenings every 6 months  9. No alcohol or raw seafood.   10. Tylenol/acetaminophen as needed for pain, up to 2000 mg daily   11. Follow up in 4 weeks      Cirrhosis    The liver is found on the right side of your abdomen, just below the rib cage. The liver has many essential functions. Among these, it filters toxins from the blood. It also helps blood clot to stop bleeding. Cirrhosis results from scarring and injury to the liver. This damage is permanent. It can lead to loss of liver function. At some point, the liver may stop working (liver failure).   Long-term heavy alcohol use and having hepatitis B or C are the two most common causes of cirrhosis. Other things that can damage the liver include toxins, certain medicines, and certain viruses.  Common symptoms of cirrhosis include:  · Tiredness, weakness  · Loss of appetite  · Nausea and vomiting  · Easy bleeding and bruising  · Abdominal swelling  · Weight loss  · Jaundice  · Itching  · Confusion  Treatment is aimed at managing symptoms and preventing further liver damage. Treatments may be given to fight the hepatitis virus. Quitting alcohol will help slow the progress of the disease and may prevent further complications. If cirrhosis progresses and becomes life threatening, a liver transplant may be an option in some cases.   Home care  · Avoid medicines that can worsen liver damage.  Your healthcare provider will explain if any of the medicines you now take need to be changed. Talk to you healthcare provider before taking any medicine, including mineral and vitamin supplements or herbs. Certain substances can worsen liver damage.  · Talk to your healthcare provider avoiding medicines  containing acetaminophen or NSAIDs (such as ibuprofen and naproxen). These can affect your liver.   · Stop drinking alcohol. If you are dependent on alcohol or find it hard to stop drinking, seek professional help. Consider joining Alcoholics Anonymous or another type of treatment program for support.  · If you use IV drugs, you are at high risk for hepatitis B and C. Seek help to stop.   Follow-up care  Follow up with your healthcare provider or as advised by our staff.  For more information and to learn about support groups for people with liver disease, contact:  · American Liver Foundation  www.liverfoundation.org  704.311.7777  · Hepatitis Foundation International  www.hepfi.org  940- 017-1642  When to seek medical advice  Call your healthcare provider for any of the following:  · Rapid weight gain with increased size of your abdomen or leg swelling  · Increasing jaundice (yellow color of skin or eyes)  · Excess bleeding from cuts or injuries  Date Last Reviewed: 6/22/2015  © 2182-8557 [employer]. [address] [place] [address], [place] [address]. All rights reserved. This information is not intended as a substitute for professional medical care. Always follow your healthcare professional's instructions.        Treating Cirrhosis  Cirrhosis is a condition where the liver is damaged. Scar tissue slowly replaces healthy tissue. Treatment can control or slow liver scarring. Follow your healthcare providers instructions closely to get the most out of your treatment. And ask your family and friends for support.  Making a treatment plan  You and your healthcare provider will decide on a treatment plan thats best for you. The plan may include one or more of the following:  · Avoiding alcohol. Heavy alcohol use can damage the liver. Once the liver is damaged, even a small amount of alcohol can cause problems. You can slow down the progression of cirrhosis if you stop all alcohol use.   · Medicines. These may  be given to treat some causes of cirrhosis, such as infection or a bile duct blockage. If needed, medicine may be used to improve blood clotting. And medicine can be given if your immune system is attacking the liver or bile ducts.  · Other medicines should be avoided in cirrhosis. These are NSAIDs, such as ibuprofen, naproxen, and similar. They can hurt the kidney in cirrhosis.   · Treating symptoms. Cirrhosis can cause swelling in the stomach and legs. A low-salt diet can help relieve this symptom. So can taking water pills (diuretics).  · Eating healthy foods  · Losing excess weight. If you have metabolic problems like being overweight, diabetes, high blood pressure, or high cholesterol and triglycerides, if you improve those diseases, you can also slow down the progression of cirrhosis. Exercise is very important.   · Removal of iron from the blood to decrease iron levels in liver tissue if these levels are high.  Severe cases of cirrhosis may need special treatments. Your healthcare provider can discuss them with you.  Avoiding alcohol  Alcohol use can destroy liver cells. If you have problems quitting alcohol, get the support you need. Your healthcare provider may be able to suggest local groups that can help you stop drinking alcohol.   Date Last Reviewed: 6/1/2016  © 1162-3519 [employer]. [address] [place] [address], [place] [address]. All rights reserved. This information is not intended as a substitute for professional medical care. Always follow your healthcare professional's instructions.        Understanding Cirrhosis    Cirrhosis is a chronic (lifelong) liver problem. It results from damaged and scarred liver tissue. Cirrhosis cant be cured. But it can be treated. Your healthcare provider can tell you more.  The liver  The liver is a large organ in the upper right part of the belly. A healthy liver metabolizes proteins, carbohydrates, and fats. It makes a digestive fluid called bile and  removes toxins from the blood. The liver is also involved in the blood-clotting process.  When you have cirrhosis  When you have cirrhosis, your liver becomes damaged and scarred. The liver doesnt function as it should. In some cases, cirrhosis can lead to liver failure. If it does, your healthcare provider will tell you whether you may need a liver transplant. You can slow down the progression of cirrhosis if you stop all alcohol use. Also, if you have metabolic problems like being overweight, diabetes, high blood pressure, or high cholesterol and triglycerides, you can also slow down the progression of cirrhosis by trying to improve those diseases.   Causes of cirrhosis  Cirrhosis causes include the following:  · Alcohol use  · Viral liver infections, such as hepatitis  · Chronic bile duct blockage  · Certain inherited diseases that can result in too much copper or iron being stored in the liver  · Certain medicines  · Nonalcoholic fatty liver disease  · Autoimmune disease  Common signs and symptoms  Typical cirrhosis signs and symptoms include the following:  · Fatigue, weakness, and lack of appetite  · Vomiting with or without blood  · Weight loss or weight gain  · Yellowish skin and eyes (jaundice)  · Itching  · Swollen belly and legs  · Intestinal bleeding  · Easy bruising of the skin  · Dilated veins in the esophagus and stomach  · Poor mental function  Date Last Reviewed: 6/1/2016  © 8406-9271 [employer]. [address] [place] [address], [place] [address]. All rights reserved. This information is not intended as a substitute for professional medical care. Always follow your healthcare professional's instructions.        Ascites    Ascites is fluid collecting in the abdomen (stomach area). Symptoms include swelling of the abdomen and a feeling of pressure. Shortness of breath may also occur. In severe cases, the feet, ankles and legs may also swell.   There are many causes of ascites. The most common  are related to the liver. They include:  · Long-term alcohol abuse  · Hepatitis  · Diseases such as congestive heart failure, kidney failure, pancreatitis, or cancer  To treat the condition, a low-salt diet may be recommended. Medicines that help fluid leave the body (diuretics) may be prescribed. In some cases, a procedure is done to drain the abdomen of fluid. This is called paracentesis. Unless the underlying cause is treated, the fluid is likely to return.  If liver damage is due to alcohol, stopping all alcohol will help slow the progress of the disease. If liver damage is from hepatitis B or C, treatments may be given to fight the virus. If liver damage becomes life threatening, a liver transplant may be needed.  Home care  · Certain medicines can worsen liver damage. Talk to your healthcare provider or pharmacist about any medicines you currently take. Ask your healthcare provider or pharmacist before taking any new medicines. Also ask before taking herbs, vitamins, or minerals. Certain ones affect the liver.  · Do not taking acetaminophen or ibuprofen without taking to your healthcare provider first. Both can affect your liver.   · Stop all alcohol use. If you abuse alcohol, talk to your healthcare provider about getting help and support to stop.   · If you use IV drugs, seek help to stop. Never share needles or other equipment.    Follow-up care  Follow up with your healthcare provider as advised. If a culture was done, call as directed for the results. Depending on the results, your treatment may change.  The following sources can tell you more about ascites and help you find support.  · American Liver Foundation [phone] www.liverfoundation.org  · Hepatitis Foundation International www.hepfi.org  · Alcoholics Anonymous www.aa.org  · [employer] [place] [employer] on Alcoholism and Drug Dependence 647-835-4434 www.ncadd.org  When to seek medical advice  Call your healthcare provider right away if you have any of the  following:  · Sudden weight gain with increased size of your abdomen or leg swelling  · Increasing jaundice (yellowing of skin or eyes)  · Excess bleeding from cuts or injuries  · Blood in vomit or stool (black or red color)  · Trouble breathing  · Increasing abdominal pain  · Fever of 100.4ºF (38ºC) or higher, or as directed by your healthcare provider  Date Last Reviewed: 6/16/2015  © 7819-6978 [employer]. [address] [place] [address], [place] [address]. All rights reserved. This information is not intended as a substitute for professional medical care. Always follow your healthcare professional's instructions.        Esophageal Varices     With esophageal varices, blood vessels in the esophagus become abnormally enlarged. They may then burst (rupture) and bleed.   Esophageal varices are enlarged veins at the lower end of the esophagus. The esophagus is the tube that carries food from your mouth to your stomach. Varices most often occur because of problems with blood flow in the liver caused by chronic liver disease. Normally, a blood vessel called the portal vein carries blood from the digestive organs to the liver. But with liver disease, blood flow can be blocked due to scarring of the liver. This increases the blood pressure in the portal vein (a condition known as portal hypertension). Blood then backs up in nearby veins in the esophagus and stomach, causing varices. Varices are a serious and deadly problem. Treatment is needed to prevent them from bursting (rupturing) and bleeding. If bleeding occurs, it can be fatal.  Symptoms of esophageal varices  Symptoms do not occur unless the varices are bleeding. This is an emergency problem. If you have any of the following symptoms, get medical attention right away:  · Vomiting blood  · Black, tarry, or bloody stools  · Feeling lightheaded, or fainting (loss of consciousness)  Diagnosing esophageal \varices  Youll likely be checked for varices if you have  liver disease or other health problems that can cause them. Your healthcare provider will ask about your symptoms and health history. Youll also be examined. Tests are then done to confirm the problem. Tests can include:  · Upper endoscopy. This is done to see inside the upper digestive tract. During the test, an endoscope is used. This is a thin, flexible tube with a tiny camera on the end. Its inserted through your mouth. Its then guided down through your esophagus, stomach, and first part of your small intestine. This allows the provider to check for varices and find any bleeding.  · Imaging tests. These provide pictures of the liver or blood flow in the liver. They allow the provider to check for enlarged veins around the liver and assess the risk of bleeding. Common imaging tests done include ultrasound and CT scans.  Treating esophageal varices  The goal of treatment is to reduce the risk of bleeding or to control bleeding. Treatment can include 1 or more of the following:  · Medicines. These may be prescribed to lower the blood pressure inside the enlarged veins. This reduces the risk of bleeding. Beta-blockers are the most common medicine used.  · Endoscopic therapy. These are treatments for enlarged or bleeding veins that are done using an endoscope. With ligation, small rubber bands are placed around the veins to close them off and stop any bleeding. With sclerotherapy, a blood-clotting medicine is injected into the veins to cause scarring and shrink them.  · Balloon tamponade. A tube with a balloon is guided down into your esophagus and stomach. The balloon is then filled with air. This puts pressure on enlarged or bleeding veins to control bleeding. This is a short-term (temporary) way to control bleeding until other treatments are available.   · Surgery. This may be done to place a tubelike device (stent) in the liver. The stent helps redirect blood flow in the liver to lower the blood pressure in  enlarged veins. Sometimes, the enlarged veins may be connected to other nearby veins to redirect blood flow. In severe cases, a liver transplant may be needed. For this surgery, a diseased liver is replaced with a healthy liver from another person.   Follow-up  Regular visits with your provider are needed to check for bleeding of the varices. If bleeding occurs, it is likely to occur again. More treatments will then be needed in the future. Once endoscopic therapy (banding) is performed, regular follow-up endoscopic scans with banding are done to completely get rid of the varices. If you are given medicines to take by mouth, be sure to take them as directed. Work closely with your provider to manage your condition. Know when to seek emergency care.  Date Last Reviewed: 7/1/2016 © 2000-2016 [employer]. [address] [place] [address], [place] [address]. All rights reserved. This information is not intended as a substitute for professional medical care. Always follow your healthcare professional's instructions.        Discharge Instructions for Hereditary Hemochromatosis  You have been diagnosed with hereditary hemochromatosis (HH). This is an inherited disease that causes you to soak up too much iron. Iron is needed for making red blood cells. But too much of it can cause serious health problems. Here's what you need to know.  Home care  · Tell your children and your brothers and sisters that you have hemochromatosis. The disease is inherited, so other family members may have it and not know it. Your first degree family members should talk to their health care provider about the need for blood testing.  · Have your iron levels checked regularly.  · Avoid drinking alcohol. If you have trouble quitting, ask your health care provider about programs to help you.  · Avoid eating large quantities of iron-rich foods, such as red meats (especially liver) and food products with iron added.  · Dont eat raw fish or raw  shellfish.  · Never take iron supplements. Even small amounts of iron in some multivitamins can be harmful.  · Dont take pills with more than 500 mg of vitamin C each day. Its OK to eat foods that have vitamin C.  Follow-up  · Make a follow-up appointment.  · Keep your follow-up appointments. You may need to have a pint of blood removed (phlebotomy) on a regular basis to keep your iron levels normal.     When to call your healthcare provider  Call your healthcare provider right away if you have any of the following:  · Tiredness  · Irregular pulse or heartbeat; any chest pain  · Loss of appetite, nausea, or vomiting  · Difficulty breathing or exercising  · Increased thirst or increased need to urinate  · Fever of 100.4°F (38°C) or higher, or as directed by your healthcare provider  · Muscle aches, joint pains, or pain in your belly  · Darkened skin for no apparent reason  · Yellowing of the skin or whites of the eyes (jaundice)   Date Last Reviewed: 4/29/2015  © 6822-3880 [employer]. [address] [place] [address], [place] [address]. All rights reserved. This information is not intended as a substitute for professional medical care. Always follow your healthcare professional's instructions.

## 2017-03-08 NOTE — LETTER
March 8, 2017      Soheila Loo NP  [address]  [place] [address]           [facility] - Hepatology  [address]  [facility] [address]  Phone: [phone]  Fax: [phone]          Patient: Fernando Garg   MR Number: [id number]   YOB: 1950   Date of Visit: 3/8/2017       Dear Soheila Loo:    Thank you for referring Fernando Garg to me for evaluation. Attached you will find relevant portions of my assessment and plan of care.    If you have questions, please do not hesitate to call me. I look forward to following Fernando Garg along with you.    Sincerely,    Nasima Ro NP    Enclosure  CC:  No Recipients    If you would like to receive this communication electronically, please contact [email or web address] or [phone] to request more information on [employer] access.    For providers and/or their staff who would like to refer a patient to [facility], please contact us through our one-stop-shop provider referral line, [facility], at [phone].    If you feel you have received this communication in error or would no longer like to receive these types of communications, please e-mail [email or web address]

## 2017-03-08 NOTE — PROGRESS NOTES
I have personally performed a face to face diagnostic evaluation on this patient. I have reviewed and agree with today's findings and the care plan outlined by Nasima Ro NP      My findings are as follows:  Patient presents with likely decompensated cirrhosis    - hx of hemochromatosis- phlebotomy for 3 years  - no leg edema     - labs  - CT to screen  - EGD to check for varices  - PEth  Aware of possibility of liver transplant.    he will return to Nasima Ro NP  for follow-up.

## 2017-03-09 ENCOUNTER — TELEPHONE (OUTPATIENT)
Dept: TRANSPLANT | Facility: CLINIC | Age: 67
End: 2017-03-09

## 2017-03-09 ENCOUNTER — HOSPITAL ENCOUNTER (OUTPATIENT)
Dept: INTERVENTIONAL RADIOLOGY/VASCULAR | Facility: HOSPITAL | Age: 67
Discharge: HOME | End: 2017-03-09
Attending: NURSE PRACTITIONER
Payer: MEDICARE

## 2017-03-09 VITALS
OXYGEN SATURATION: 100 % | DIASTOLIC BLOOD PRESSURE: 80 MMHG | HEART RATE: 73 BPM | SYSTOLIC BLOOD PRESSURE: 132 MMHG | RESPIRATION RATE: 18 BRPM

## 2017-03-09 DIAGNOSIS — K74.60 CIRRHOSIS OF LIVER WITHOUT ASCITES, UNSPECIFIED HEPATIC CIRRHOSIS TYPE: ICD-10-CM

## 2017-03-09 DIAGNOSIS — R18.8 OTHER ASCITES: ICD-10-CM

## 2017-03-09 LAB
ALBUMIN FLD-MCNC: 0.8 G/DL
APPEARANCE FLD: NORMAL
BODY FLD TYPE: NORMAL
COLOR FLD: YELLOW
LYMPHOCYTES NFR FLD MANUAL: 43 %
MESOTHL CELL NFR FLD MANUAL: 22 %
MONOS+MACROS NFR FLD MANUAL: 26 %
NEUTROPHILS NFR FLD MANUAL: 9 %
PROT FLD-MCNC: 1.5 G/DL
SPECIMEN SOURCE: NORMAL
SPECIMEN SOURCE: NORMAL
WBC # FLD: 240 /CU MM

## 2017-03-09 PROCEDURE — 87075 CULTR BACTERIA EXCEPT BLOOD: CPT

## 2017-03-09 PROCEDURE — 87070 CULTURE OTHR SPECIMN AEROBIC: CPT

## 2017-03-09 PROCEDURE — 89051 BODY FLUID CELL COUNT: CPT

## 2017-03-09 PROCEDURE — 84157 ASSAY OF PROTEIN OTHER: CPT

## 2017-03-09 PROCEDURE — 88305 TISSUE EXAM BY PATHOLOGIST: CPT | Mod: 26,,, | Performed by: PATHOLOGY

## 2017-03-09 PROCEDURE — C1729 CATH, DRAINAGE: HCPCS

## 2017-03-09 PROCEDURE — 88112 CYTOPATH CELL ENHANCE TECH: CPT | Mod: 26,,, | Performed by: PATHOLOGY

## 2017-03-09 PROCEDURE — 82042 OTHER SOURCE ALBUMIN QUAN EA: CPT

## 2017-03-09 PROCEDURE — 49083 ABD PARACENTESIS W/IMAGING: CPT | Mod: ,,, | Performed by: NURSE PRACTITIONER

## 2017-03-09 PROCEDURE — 88305 TISSUE EXAM BY PATHOLOGIST: CPT | Performed by: PATHOLOGY

## 2017-03-09 NOTE — TELEPHONE ENCOUNTER
Initial referral received via fax from Dr Castrejon and Nasima Ro's office.   Patient with cirrhosis/hemachromatosis.  MELD 16  Referred for liver transplant for EVALUATION.    Referral completed and forwarded to Michelle Crowder, [facility].      Insurance: EPIC MEDICARE   Contact #

## 2017-03-09 NOTE — IP AVS SNAPSHOT
[facility]  [address]  [facility] [address]  Phone: [phone]           Patient Discharge Instructions     Our goal is to set you up for success. This packet includes information on your condition, medications, and your home care. It will help you to care for yourself so you don't get sicker and need to go back to the hospital.     Please ask your nurse if you have any questions.        There are many details to remember when preparing to leave the hospital. Here is what you will need to do:    1. Take your medicine. If you are prescribed medications, review your Medication List in the following pages. You may have new medications to  at the pharmacy and others that you'll need to stop taking. Review the instructions for how and when to take your medications. Talk with your doctor or nurses if you are unsure of what to do.     2. Go to your follow-up appointments. Specific follow-up information is listed in the following pages. Your may be contacted by a transition nurse or clinical provider about future appointments. Be sure we have all of the phone numbers to reach you, if needed. Please contact your provider's office if you are unable to make an appointment.     3. Watch for warning signs. Your doctor or nurse will give you detailed warning signs to watch for and when to call for assistance. These instructions may also include educational information about your condition. If you experience any of warning signs to your health, call your doctor.               [facility]  Unless otherwise directed by your provider, please contact [facility], our nurse care line that is available for 24/7 assistance.     [phone] (toll-free)    Registered nurses in the [facility] provide clinical advisement, health education, appointment booking, and other advisory services.                    ** Verify the list of medication(s) below is accurate and up  to date. Carry this with you in case of emergency. If your medications have changed, please notify your healthcare provider.             Medication List      TAKE these medications        Additional Info                      clonazePAM 0.5 MG tablet   Commonly known as:  KLONOPIN   Refills:  0   Dose:  0.5 mg    Instructions:  Take 0.5 mg by mouth 2 (two) times daily as needed for Anxiety.     Begin Date    AM    Noon    PM    Bedtime       gabapentin 300 MG capsule   Commonly known as:  NEURONTIN   Refills:  0   Dose:  300 mg    Instructions:  Take 300 mg by mouth 3 (three) times daily.     Begin Date    AM    Noon    PM    Bedtime       tamsulosin 0.4 mg Cp24   Commonly known as:  FLOMAX   Refills:  0   Dose:  0.4 mg    Instructions:  Take 0.4 mg by mouth once daily.     Begin Date    AM    Noon    PM    Bedtime       tramadol 50 mg tablet   Commonly known as:  ULTRAM   Refills:  0   Dose:  50 mg    Instructions:  Take 50 mg by mouth every 6 (six) hours as needed for Pain.     Begin Date    AM    Noon    PM    Bedtime                  Please bring to all follow up appointments:    1. A copy of your discharge instructions.  2. All medicines you are currently taking in their original bottles.  3. Identification and insurance card.    Please arrive 15 minutes ahead of scheduled appointment time.    Please call 24 hours in advance if you must reschedule your appointment and/or time.        Your Scheduled Appointments     Apr 06, 2017  9:20 AM CDT   Established Patient Visit with [facility] - Hepatology ([address] )    [address]  [facility] [address] [phone]                  Discharge Instructions       For scheduling: Call Mónica at [phone]    For questions or concerns call: ROCU MON-FRI 8 AM- 5PM [phone]. Radiology resident on call [phone].    For immediate concerns that are not emergent, you may call our radiology clinic at: [phone]      Discharge  References/Attachments     PARACENTESIS, DISCHARGE INSTRUCTIONS FOR (ENGLISH)        Admission Information     Date & Time Provider Department CSN    3/9/2017  8:00 AM Nasima Ro NP [facility] [id number]      Care Providers     Provider Role Specialty Primary office phone    Nasima Ro NP Attending Provider Hepatology [phone]      Your Vitals Were     BP Pulse Resp SpO2          139/73 78 18 99%        Recent Lab Values     No lab values to display.      Allergies as of 3/9/2017        Reactions    Sulfa (Sulfonamide Antibiotics) Nausea And Vomiting      Advance Directives     An advance directive is a document which, in the event you are no longer able to make decisions for yourself, tells your healthcare team what kind of treatment you do or do not want to receive, or who you would like to make those decisions for you.  If you do not currently have an advance directive, [facility] encourages you to create one.  For more information call:  [phone] ([phone]),  or log on to [email or web address].        Language Assistance Services     ATTENTION: Language assistance services are available, free of charge. Please call [phone].      ATENCIÓN: Si marly biggs, tiene a moreno disposición servicios gratuitos de asistencia lingüística. Llame al [phone].     CHÚ Ý: N?u b?n nói Ti?ng Vi?t, có các d?ch v? h? tr? ngôn ng? mi?n phí dành cho b?n. G?i s? [phone].        [facility] Sign-Up     Activating your [facility] account is as easy as 1-2-3!     1) Visit [email or web address], select Sign Up Now, enter this activation code and your date of birth, then select Next.  [email or web address]  Expires: 4/22/2017  9:50 AM      2) Create a username and password to use when you visit [facility] in the future and select a security question in case you lose your password and select Next.    3) Enter your e-mail address and click Sign Up!    Additional  Information  If you have questions, please e-mail [email or web address] or call [phone] to talk to our [facility] staff. Remember, [facility] is NOT to be used for urgent needs. For medical emergencies, dial 911.          [facility] complies with applicable Federal civil rights laws and does not discriminate on the basis of race, color, national origin, age, disability, or sex.

## 2017-03-09 NOTE — H&P
Radiology History & Physical      SUBJECTIVE:     Chief Complaint: Ascites    History of Present Illness:  Fernando Garg is a 66 y.o. male who presents for ultrasound guided paracentesis  Past Medical History:   Diagnosis Date    BPH (benign prostatic hyperplasia)     Cirrhosis of liver with ascites 3/8/2017    Hemochromatosis     Neuropathy      Past Surgical History:   Procedure Laterality Date    COLONOS[facility]  2012    [facility] polyps[facility]    HERNIA REPAIR         Home Meds:   Prior to Admission medications    Medication Sig Start Date End Date Taking? Authorizing Provider   clonazePAM (KLONOPIN) 0.5 MG tablet Take 0.5 mg by mouth 2 (two) times daily as needed for Anxiety.    Historical Provider, MD   gabapentin (NEURONTIN) 300 MG capsule Take 300 mg by mouth 3 (three) times daily.    Historical Provider, MD   tamsulosin (FLOMAX) 0.4 mg Cp24 Take 0.4 mg by mouth once daily.    Historical Provider, MD   tramadol (ULTRAM) 50 mg tablet Take 50 mg by mouth every 6 (six) hours as needed for Pain.    Historical Provider, MD     Anticoagulants/Antiplatelets: no anticoagulation    Allergies:   Review of patient's allergies indicates:   Allergen Reactions    Sulfa (sulfonamide antibiotics) Nausea And Vomiting     Sedation History:  no adverse reactions    Review of Systems:   Hematological: no known coagulopathies  Respiratory: no shortness of breath  Cardiovascular: no chest pain  Gastrointestinal: no abdominal pain  Genito-Urinary: no dysuria  Musculoskeletal: negative  Neurological: no TIA or stroke symptoms         OBJECTIVE:     Vital Signs (Most Recent)  Pulse: 78 (03/09/17 0804)  Resp: 18 (03/09/17 0804)  BP: 139/73 (03/09/17 0804)  SpO2: 99 % (03/09/17 0804)    Physical Exam:  ASA: 3  Mallampati: na    General: no acute distress  Mental Status: alert and oriented to person, place and time  HEENT: normocephalic, atraumatic  Chest: unlabored breathing  Heart: regular heart rate  Abdomen:  mildly distended  Extremity: moves all extremities    Laboratory  Lab Results   Component Value Date    INR 1.4 (H) 03/08/2017       Lab Results   Component Value Date    WBC 2.92 (L) 03/08/2017    HGB 13.4 (L) 03/08/2017    HCT 38.6 (L) 03/08/2017     (H) 03/08/2017    PLT 96 (L) 03/08/2017      Lab Results   Component Value Date     (H) 03/08/2017     (L) 03/08/2017    K 3.8 03/08/2017     03/08/2017    CO2 24 03/08/2017    BUN 9 03/08/2017    CREATININE 0.8 03/08/2017    CALCIUM 8.7 03/08/2017    ALT 26 03/08/2017    AST 60 (H) 03/08/2017    ALBUMIN 2.9 (L) 03/08/2017    BILITOT 2.8 (H) 03/08/2017       ASSESSMENT/PLAN:     Sedation Plan: local  Patient will undergo ultrasound guided paracentesis.    SHELLY Posadas, FNP  Interventional Radiology  [phone] [employer]

## 2017-03-09 NOTE — DISCHARGE INSTRUCTIONS
For scheduling: Call Mónica at [phone]    For questions or concerns call: DEEPTI MON-FRI 8 AM- 5PM [phone]. Radiology resident on call [phone].    For immediate concerns that are not emergent, you may call our radiology clinic at: [phone]

## 2017-03-09 NOTE — TELEPHONE ENCOUNTER
Deferred   No prescription coverage.   Patient needs to enroll to pursue with liver evaluation. (Routing comment)             Pt and referring will be notified.  Pt aware he does not have prescription coverage.  Pt reports he is 100% disabled and [facility] but [facility].  He will see about Medicaid benefits. PT will call with information as soon as it taken care of.

## 2017-03-09 NOTE — PROCEDURES
Radiology Post-Procedure Note    Pre Op Diagnosis: Ascites  Post Op Diagnosis: Same    Procedure: Ultrasound Guided Paracentesis    Procedure performed by: Ren Sy NP/ Leroy Collier MD    Written Informed Consent Obtained: Yes  Specimen Removed: YES cloudy yellow fluid   Estimated Blood Loss: Minimal    Findings:   Successful paracentesis.  Albumin administered PRN per protocol.    Patient tolerated procedure well.    SHELLY Posadas, FNP  Interventional Radiology  [phone] [employer]

## 2017-03-09 NOTE — PROGRESS NOTES
Paracentesis complete. 2600 mLs peritoneal fluid drained. Pt tolerated well. Dressing to clean, dry, and intact. Specimens sent per lab order. Discharge instructions and handouts provided. Pt verbalized understanding.

## 2017-03-10 ENCOUNTER — TELEPHONE (OUTPATIENT)
Dept: HEPATOLOGY | Facility: CLINIC | Age: 67
End: 2017-03-10

## 2017-03-13 LAB — BACTERIA SPEC AEROBE CULT: NO GROWTH

## 2017-03-14 ENCOUNTER — TELEPHONE (OUTPATIENT)
Dept: FAMILY MEDICINE | Facility: CLINIC | Age: 67
End: 2017-03-14

## 2017-03-14 ENCOUNTER — TELEPHONE (OUTPATIENT)
Dept: HEPATOLOGY | Facility: CLINIC | Age: 67
End: 2017-03-14

## 2017-03-14 ENCOUNTER — TELEPHONE (OUTPATIENT)
Dept: ENDOSCOPY | Facility: HOSPITAL | Age: 67
End: 2017-03-14

## 2017-03-14 DIAGNOSIS — K74.60 CIRRHOSIS OF LIVER WITHOUT ASCITES, UNSPECIFIED HEPATIC CIRRHOSIS TYPE: Primary | ICD-10-CM

## 2017-03-14 DIAGNOSIS — E83.119 HEMOCHROMATOSIS, UNSPECIFIED HEMOCHROMATOSIS TYPE: ICD-10-CM

## 2017-03-14 DIAGNOSIS — R18.8 OTHER ASCITES: ICD-10-CM

## 2017-03-14 RX ORDER — SPIRONOLACTONE 50 MG/1
50 TABLET, FILM COATED ORAL DAILY
Qty: 30 TABLET | Refills: 2 | Status: SHIPPED | OUTPATIENT
Start: 2017-03-14 | End: 2017-03-29 | Stop reason: SDUPTHER

## 2017-03-14 RX ORDER — FUROSEMIDE 20 MG/1
20 TABLET ORAL DAILY
Qty: 30 TABLET | Refills: 2 | Status: SHIPPED | OUTPATIENT
Start: 2017-03-14 | End: 2017-03-29 | Stop reason: SDUPTHER

## 2017-03-14 NOTE — TELEPHONE ENCOUNTER
MA called [facility] in [place]. They did not do Phlebotomy in that facility. Called [facility] they do no do phlebotomy also. They gave me [facility] [phone] they said patient can just walk in just make sure that patient have ORDER with diagnosis and instruction on how often he needed the phlebotomy. SANDRA     I tried to call [facility] [place] [phone] unable to get a hold of this facility.

## 2017-03-14 NOTE — TELEPHONE ENCOUNTER
----- Message from Ivette Mays sent at 3/14/2017  1:37 PM CDT -----  Contact: self [phone]  Please call him regarding an endoscopy.  Thank you!

## 2017-03-14 NOTE — TELEPHONE ENCOUNTER
MA called patient, schedule his labs this coming Friday 3/17/17, reschedule his appt on 3/27/17 at 3:40pm. Mailed appt reminder to patient.     Inform patient that he need [facility] [place] Address: [address] [place] [address] [place] [place] [address]  [phone]  Hours: Open today · 11AM-7PM    Information was given to the patient. He said he will go there and will be waiting for the ORDER IN THE MAIL. SANDRA

## 2017-03-14 NOTE — TELEPHONE ENCOUNTER
Order composed. Can they do labs there or does he need weekly labs done at the [facility] in [place]? If so, please schedule weekly labs for him for CBC, ferritin, and CMP x next 12 weeks.

## 2017-03-14 NOTE — TELEPHONE ENCOUNTER
Called pt and discussed CT results. Has 3 masses. He is on for IR review next week. He reports improvement in ascites but still some present. Will start low dose diuretics with lasix 20 mg and spironolactone 50 mg daily. Rx's sent. Will need repeat lab in one week. EGD ordered and pt instructed to call to arrange appt. Will move up f/u appt to 3/27 per pt preference. Pt would like to arrange phlebotomy on [facility] if possible. Has been having this at [facility]. Needs weekly phlebotomy. Will work on arranging this.     Please call pt to schedule:  1. Lab for CMP, INR next week at [place]  2. Phlebotomy weekly at [facility]. Needs weekly labs with phlebotomy for CBC, CMP, ferritin.  3. Move f/u appt with me to 3/27/17 in PM

## 2017-03-15 ENCOUNTER — TELEPHONE (OUTPATIENT)
Dept: FAMILY MEDICINE | Facility: CLINIC | Age: 67
End: 2017-03-15

## 2017-03-15 NOTE — TELEPHONE ENCOUNTER
MA called patient to inform him that we have mailed his Phlebotomy order and blood work he will have his labs weekly at blood bank also.SANDRA

## 2017-03-15 NOTE — TELEPHONE ENCOUNTER
Wants to know if you will take him as a pt.   Marrying lorne dean. Cousin to denys tubbs. Soon to be a relative.

## 2017-03-15 NOTE — TELEPHONE ENCOUNTER
Spoke with pt to let him know that Dr. Tang panel is closed and he doesn't treat family or extended family as a rule.

## 2017-03-15 NOTE — TELEPHONE ENCOUNTER
I'm, not accepting new patients and as a rule I don't treat family or even extended family or family by marriage.

## 2017-03-16 ENCOUNTER — HOSPITAL ENCOUNTER (OUTPATIENT)
Facility: HOSPITAL | Age: 67
Discharge: HOME | End: 2017-03-16
Attending: INTERNAL MEDICINE | Admitting: INTERNAL MEDICINE
Payer: MEDICARE

## 2017-03-16 ENCOUNTER — ANESTHESIA EVENT (OUTPATIENT)
Dept: ENDOSCOPY | Facility: HOSPITAL | Age: 67
End: 2017-03-16
Payer: MEDICARE

## 2017-03-16 ENCOUNTER — SURGERY (OUTPATIENT)
Age: 67
End: 2017-03-16

## 2017-03-16 ENCOUNTER — ANESTHESIA (OUTPATIENT)
Dept: ENDOSCOPY | Facility: HOSPITAL | Age: 67
End: 2017-03-16
Payer: MEDICARE

## 2017-03-16 VITALS
OXYGEN SATURATION: 98 % | WEIGHT: 165 LBS | RESPIRATION RATE: 15 BRPM | TEMPERATURE: 98 F | HEIGHT: 72 IN | BODY MASS INDEX: 22.35 KG/M2 | RESPIRATION RATE: 36 BRPM | DIASTOLIC BLOOD PRESSURE: 76 MMHG | SYSTOLIC BLOOD PRESSURE: 120 MMHG | HEART RATE: 72 BPM

## 2017-03-16 DIAGNOSIS — R18.8 CIRRHOSIS OF LIVER WITH ASCITES, UNSPECIFIED HEPATIC CIRRHOSIS TYPE: Primary | ICD-10-CM

## 2017-03-16 DIAGNOSIS — K74.60 CIRRHOSIS OF LIVER WITH ASCITES, UNSPECIFIED HEPATIC CIRRHOSIS TYPE: Primary | ICD-10-CM

## 2017-03-16 DIAGNOSIS — K74.60 CIRRHOSIS: ICD-10-CM

## 2017-03-16 LAB — BACTERIA SPEC ANAEROBE CULT: NORMAL

## 2017-03-16 PROCEDURE — 43235 EGD DIAGNOSTIC BRUSH WASH: CPT | Mod: ,,, | Performed by: INTERNAL MEDICINE

## 2017-03-16 PROCEDURE — 25000003 PHARM REV CODE 250: Performed by: NURSE ANESTHETIST, CERTIFIED REGISTERED

## 2017-03-16 PROCEDURE — D9220A PRA ANESTHESIA: Mod: ANES,,, | Performed by: ANESTHESIOLOGY

## 2017-03-16 PROCEDURE — 63600175 PHARM REV CODE 636 W HCPCS: Performed by: NURSE ANESTHETIST, CERTIFIED REGISTERED

## 2017-03-16 PROCEDURE — 25000003 PHARM REV CODE 250: Performed by: INTERNAL MEDICINE

## 2017-03-16 PROCEDURE — 37000008 HC ANESTHESIA 1ST 15 MINUTES: Performed by: INTERNAL MEDICINE

## 2017-03-16 PROCEDURE — 37000009 HC ANESTHESIA EA ADD 15 MINS: Performed by: INTERNAL MEDICINE

## 2017-03-16 PROCEDURE — 43235 EGD DIAGNOSTIC BRUSH WASH: CPT | Performed by: INTERNAL MEDICINE

## 2017-03-16 PROCEDURE — D9220A PRA ANESTHESIA: Mod: CRNA,,, | Performed by: NURSE ANESTHETIST, CERTIFIED REGISTERED

## 2017-03-16 RX ORDER — SODIUM CHLORIDE 9 MG/ML
INJECTION, SOLUTION INTRAVENOUS CONTINUOUS
Status: DISCONTINUED | OUTPATIENT
Start: 2017-03-16 | End: 2017-03-16 | Stop reason: HOSPADM

## 2017-03-16 RX ORDER — LIDOCAINE HCL/PF 100 MG/5ML
SYRINGE (ML) INTRAVENOUS
Status: DISCONTINUED | OUTPATIENT
Start: 2017-03-16 | End: 2017-03-16

## 2017-03-16 RX ORDER — PROPOFOL 10 MG/ML
VIAL (ML) INTRAVENOUS CONTINUOUS PRN
Status: DISCONTINUED | OUTPATIENT
Start: 2017-03-16 | End: 2017-03-16

## 2017-03-16 RX ORDER — PROPOFOL 10 MG/ML
VIAL (ML) INTRAVENOUS
Status: DISCONTINUED | OUTPATIENT
Start: 2017-03-16 | End: 2017-03-16

## 2017-03-16 RX ADMIN — SODIUM CHLORIDE: 0.9 INJECTION, SOLUTION INTRAVENOUS at 10:03

## 2017-03-16 RX ADMIN — LIDOCAINE HYDROCHLORIDE 50 MG: 20 INJECTION, SOLUTION INTRAVENOUS at 10:03

## 2017-03-16 RX ADMIN — PROPOFOL 75 MG: 10 INJECTION, EMULSION INTRAVENOUS at 10:03

## 2017-03-16 RX ADMIN — PROPOFOL 150 MCG/KG/MIN: 10 INJECTION, EMULSION INTRAVENOUS at 10:03

## 2017-03-16 RX ADMIN — TOPICAL ANESTHETIC 1 EACH: 200 SPRAY DENTAL; PERIODONTAL at 10:03

## 2017-03-16 NOTE — IP AVS SNAPSHOT
[facility]  [address]  [facility] [address]  Phone: [phone]           Patient Discharge Instructions     Our goal is to set you up for success. This packet includes information on your condition, medications, and your home care. It will help you to care for yourself so you don't get sicker and need to go back to the hospital.     Please ask your nurse if you have any questions.        There are many details to remember when preparing to leave the hospital. Here is what you will need to do:    1. Take your medicine. If you are prescribed medications, review your Medication List in the following pages. You may have new medications to  at the pharmacy and others that you'll need to stop taking. Review the instructions for how and when to take your medications. Talk with your doctor or nurses if you are unsure of what to do.     2. Go to your follow-up appointments. Specific follow-up information is listed in the following pages. Your may be contacted by a transition nurse or clinical provider about future appointments. Be sure we have all of the phone numbers to reach you, if needed. Please contact your provider's office if you are unable to make an appointment.     3. Watch for warning signs. Your doctor or nurse will give you detailed warning signs to watch for and when to call for assistance. These instructions may also include educational information about your condition. If you experience any of warning signs to your health, call your doctor.               [facility]  Unless otherwise directed by your provider, please contact [facility], our nurse care line that is available for 24/7 assistance.     [phone] (toll-free)    Registered nurses in the [facility] provide clinical advisement, health education, appointment booking, and other advisory services.                    ** Verify the list of medication(s) below is accurate and up  to date. Carry this with you in case of emergency. If your medications have changed, please notify your healthcare provider.             Medication List      ASK your doctor about these medications        Additional Info                      clonazePAM 0.5 MG tablet   Commonly known as:  KLONOPIN   Refills:  0   Dose:  0.5 mg    Instructions:  Take 0.5 mg by mouth 2 (two) times daily as needed for Anxiety.     Begin Date    AM    Noon    PM    Bedtime       furosemide 20 MG tablet   Commonly known as:  LASIX   Quantity:  30 tablet   Refills:  2   Dose:  20 mg    Instructions:  Take 1 tablet (20 mg total) by mouth once daily.     Begin Date    AM    Noon    PM    Bedtime       gabapentin 300 MG capsule   Commonly known as:  NEURONTIN   Refills:  0   Dose:  300 mg    Instructions:  Take 300 mg by mouth 3 (three) times daily.     Begin Date    AM    Noon    PM    Bedtime       Lactobacillus rhamnosus GG 10 billion cell capsule   Commonly known as:  CULTURELLE   Refills:  0   Dose:  1 capsule    Instructions:  Take 1 capsule by mouth once daily.     Begin Date    AM    Noon    PM    Bedtime       spironolactone 50 MG tablet   Commonly known as:  ALDACTONE   Quantity:  30 tablet   Refills:  2   Dose:  50 mg    Instructions:  Take 1 tablet (50 mg total) by mouth once daily.     Begin Date    AM    Noon    PM    Bedtime       tamsulosin 0.4 mg Cp24   Commonly known as:  FLOMAX   Refills:  0   Dose:  0.4 mg    Instructions:  Take 0.4 mg by mouth once daily.     Begin Date    AM    Noon    PM    Bedtime       tramadol 50 mg tablet   Commonly known as:  ULTRAM   Refills:  0   Dose:  50 mg    Instructions:  Take 50 mg by mouth every 6 (six) hours as needed for Pain.     Begin Date    AM    Noon    PM    Bedtime                  Please bring to all follow up appointments:    1. A copy of your discharge instructions.  2. All medicines you are currently taking in their original bottles.  3. Identification and insurance  card.    Please arrive 15 minutes ahead of scheduled appointment time.    Please call 24 hours in advance if you must reschedule your appointment and/or time.        Your Scheduled Appointments     Mar 17, 2017 10:15 AM CDT   Non-Fasting Lab with [facility], [place] [facility] ([place])    [address]  [place] [address]   [phone]            Mar 27, 2017  3:40 PM CDT   Established Patient Visit with [facility] - Hepatology ([facility] )    [address]  [facility] [address]   [phone]                  Discharge Instructions         Esophageal Varices     With esophageal varices, blood vessels in the esophagus become abnormally enlarged. They may then burst (rupture) and bleed.   Esophageal varices are enlarged veins at the lower end of the esophagus. The esophagus is the tube that carries food from your mouth to your stomach. Varices most often occur because of problems with blood flow in the liver caused by chronic liver disease. Normally, a blood vessel called the portal vein carries blood from the digestive organs to the liver. But with liver disease, blood flow can be blocked due to scarring of the liver. This increases the blood pressure in the portal vein (a condition known as portal hypertension). Blood then backs up in nearby veins in the esophagus and stomach, causing varices. Varices are a serious and deadly problem. Treatment is needed to prevent them from bursting (rupturing) and bleeding. If bleeding occurs, it can be fatal.  Symptoms of esophageal varices  Symptoms do not occur unless the varices are bleeding. This is an emergency problem. If you have any of the following symptoms, get medical attention right away:  · Vomiting blood  · Black, tarry, or bloody stools  · Feeling lightheaded, or fainting (loss of consciousness)  Diagnosing esophageal \varices  Youll likely be checked for varices if you have liver disease or other  health problems that can cause them. Your healthcare provider will ask about your symptoms and health history. Youll also be examined. Tests are then done to confirm the problem. Tests can include:  · Upper endoscopy. This is done to see inside the upper digestive tract. During the test, an endoscope is used. This is a thin, flexible tube with a tiny camera on the end. Its inserted through your mouth. Its then guided down through your esophagus, stomach, and first part of your small intestine. This allows the provider to check for varices and find any bleeding.  · Imaging tests. These provide pictures of the liver or blood flow in the liver. They allow the provider to check for enlarged veins around the liver and assess the risk of bleeding. Common imaging tests done include ultrasound and CT scans.  Treating esophageal varices  The goal of treatment is to reduce the risk of bleeding or to control bleeding. Treatment can include 1 or more of the following:  · Medicines. These may be prescribed to lower the blood pressure inside the enlarged veins. This reduces the risk of bleeding. Beta-blockers are the most common medicine used.  · Endoscopic therapy. These are treatments for enlarged or bleeding veins that are done using an endoscope. With ligation, small rubber bands are placed around the veins to close them off and stop any bleeding. With sclerotherapy, a blood-clotting medicine is injected into the veins to cause scarring and shrink them.  · Balloon tamponade. A tube with a balloon is guided down into your esophagus and stomach. The balloon is then filled with air. This puts pressure on enlarged or bleeding veins to control bleeding. This is a short-term (temporary) way to control bleeding until other treatments are available.   · Surgery. This may be done to place a tubelike device (stent) in the liver. The stent helps redirect blood flow in the liver to lower the blood pressure in enlarged veins.  Sometimes, the enlarged veins may be connected to other nearby veins to redirect blood flow. In severe cases, a liver transplant may be needed. For this surgery, a diseased liver is replaced with a healthy liver from another person.   Follow-up  Regular visits with your provider are needed to check for bleeding of the varices. If bleeding occurs, it is likely to occur again. More treatments will then be needed in the future. Once endoscopic therapy (banding) is performed, regular follow-up endoscopic scans with banding are done to completely get rid of the varices. If you are given medicines to take by mouth, be sure to take them as directed. Work closely with your provider to manage your condition. Know when to seek emergency care.  Date Last Reviewed: 7/1/2016 © 2000-2016 [employer]. [address] [place] [address]. All rights reserved. This information is not intended as a substitute for professional medical care. Always follow your healthcare professional's instructions.            Admission Information     Date & Time Provider Department CSN    3/16/2017  9:33 AM Juana King MD [id number]      Care Providers     Provider Role Specialty Primary office phone    Juana King MD Attending Provider Gastroenterology [phone]    Juana King MD Surgeon  Gastroenterology [phone]      Your Vitals Were     BP Pulse Temp Resp Height Weight    120/76 (BP Location: Left arm, Patient Position: Sitting, BP Method: Automatic) 72 97.7 °F (36.5 °C) (Oral) 15 6' (1.829 m) 74.8 kg (165 lb)    SpO2 BMI             98% 22.38 kg/m2         Recent Lab Values     No lab values to display.      Allergies as of 3/16/2017        Reactions    Sulfa (Sulfonamide Antibiotics) Nausea And Vomiting      Advance Directives     An advance directive is a document which, in the event you are no longer able to make decisions for yourself, tells your healthcare team what  kind of treatment you do or do not want to receive, or who you would like to make those decisions for you.  If you do not currently have an advance directive, [facility] encourages you to create one.  For more information call:  [phone]), [phone] ([phone]),  or log on to [email or web address].        Language Assistance Services     ATTENTION: Language assistance services are available, free of charge. Please call [phone].      ATENCIÓN: Si habla español, tiene a moreno disposición servicios gratuitos de asistencia lingüística. Llame al [phone].     CHÚ Ý: N?u b?n nói Ti?ng Vi?t, có các d?ch v? h? tr? ngôn ng? mi?n phí dành cho b?n. G?i s? [phone].        [facility] Sign-Up     Activating your [facility] account is as easy as 1-2-3!     1) Visit [email or web address], select Sign Up Now, enter this activation code and your date of birth, then select Next.  [email or web address]  Expires: 4/22/2017 10:50 AM      2) Create a username and password to use when you visit [facility] in the future and select a security question in case you lose your password and select Next.    3) Enter your e-mail address and click Sign Up!    Additional Information  If you have questions, please e-mail [email or web address] or call [phone] to talk to our [facility] staff. Remember, [facility] is NOT to be used for urgent needs. For medical emergencies, dial 911.          [facility] complies with applicable Federal civil rights laws and does not discriminate on the basis of race, color, national origin, age, disability, or sex.

## 2017-03-16 NOTE — ANESTHESIA PREPROCEDURE EVALUATION
03/16/2017  Fernando Garg is a 66 y.o., male.    [facility] Anesthesia Evaluation    I have reviewed the Patient Summary Reports.    I have reviewed the Nursing Notes.   I have reviewed the Medications.     Review of Systems  Anesthesia Hx:  No problems with previous Anesthesia  History of prior surgery of interest to airway management or planning: Previous anesthesia: General Denies Family Hx of Anesthesia complications.   Denies Personal Hx of Anesthesia complications.   Social:  Non-Smoker    Hematology/Oncology:     Oncology Normal   Hematology Comments: Hemochromatosis   EENT/Dental:   Severe periodontal disease   Cardiovascular:  Cardiovascular Normal Exercise tolerance: good     Pulmonary:  Pulmonary Normal    Renal/:   BPH    Hepatic/GI:   Liver Disease, Cirrhosis; ascites   Musculoskeletal:  Musculoskeletal Normal    Neurological:   Peripheral Neuropathy    Endocrine:  Endocrine Normal    Dermatological:  Skin Normal    Psych:  Psychiatric Normal           Physical Exam  General:  Well nourished    Airway/Jaw/Neck:  Airway Findings: Mouth Opening: Small, but > 3cm Tongue: Normal  General Airway Assessment: Adult, Average  Mallampati: III  Improves to II with phonation.  TM Distance: 4 - 6 cm        Eyes/Ears/Nose:  EYES/EARS/NOSE FINDINGS: Normal   Dental:  Dental Findings: Periodontal disease, Severe   Chest/Lungs:  Chest/Lungs Findings: Clear to auscultation, Normal Respiratory Rate     Heart/Vascular:  Heart Findings: Rate: Normal  Rhythm: Regular Rhythm  Sounds: Normal  Heart murmur: negative Vascular Findings: Normal    Abdomen:  Abdomen Findings: Normal    Musculoskeletal:  Musculoskeletal Findings: Normal   Skin:  Skin Findings: Normal    Mental Status:  Mental Status Findings:  Cooperative, Alert and Oriented         Anesthesia Plan  Type of Anesthesia, risks & benefits discussed:  Anesthesia  Type:  general  Patient's Preference:   Intra-op Monitoring Plan:   Intra-op Monitoring Plan Comments:   Post Op Pain Control Plan:   Post Op Pain Control Plan Comments:   Induction:   IV  Beta Blocker:  Patient is not currently on a Beta-Blocker (No further documentation required).       Informed Consent: Patient understands risks and agrees with Anesthesia plan.  Questions answered. Anesthesia consent signed with patient.  ASA Score: 3     Day of Surgery Review of History & Physical:  There are no significant changes.          Ready For Surgery From Anesthesia Perspective.

## 2017-03-16 NOTE — H&P
Short Stay Endoscopy History and Physical    PCP - Gilo Kawasaki, MD  Referring Physician - Nasima Ro, NP  [address] [place] [address]    Procedure - EGD  ASA - per anesthesia  Mallampati - per anesthesia  History of Anesthesia problems - no  Family history Anesthesia problems -  no   Plan of anesthesia - General    HPI  66 y.o. male  Reason for procedure: cirrhosis, varices screening     ROS:  Constitutional: No fevers, chills, No weight loss  CV: No chest pain  Pulm: No cough, No shortness of breath  GI: see HPI    Medical History:  has a past medical history of BPH (benign prostatic hyperplasia); Cirrhosis of liver with ascites (3/8/2017); Hemochromatosis; and Neuropathy.    Surgical History:  has a past surgical history that includes Hernia repair and Colonoscopy (2012).    Family History: family history includes Breast cancer in his mother; Lung cancer in his mother; Stroke in his father. There is no history of Cirrhosis.. Otherwise no colon cancer, inflammatory bowel disease, or GI malignancies.    Social History:  reports that he has quit smoking. He does not have any smokeless tobacco history on file. He reports that he does not drink alcohol or use illicit drugs.    Review of patient's allergies indicates:   Allergen Reactions    Sulfa (sulfonamide antibiotics) Nausea And Vomiting       Medications:   Prescriptions Prior to Admission   Medication Sig Dispense Refill Last Dose    clonazePAM (KLONOPIN) 0.5 MG tablet Take 0.5 mg by mouth 2 (two) times daily as needed for Anxiety.   3/15/2017 at Unknown time    gabapentin (NEURONTIN) 300 MG capsule Take 300 mg by mouth 3 (three) times daily.   3/15/2017 at Unknown time    Lactobacillus rhamnosus GG (CULTURELLE) 10 billion cell capsule Take 1 capsule by mouth once daily.   3/15/2017 at Unknown time    furosemide (LASIX) 20 MG tablet Take 1 tablet (20 mg total) by mouth once daily. 30 tablet 2 Unknown at Unknown time    spironolactone  (ALDACTONE) 50 MG tablet Take 1 tablet (50 mg total) by mouth once daily. 30 tablet 2 Unknown at Unknown time    tamsulosin (FLOMAX) 0.4 mg Cp24 Take 0.4 mg by mouth once daily.   Not Taking    tramadol (ULTRAM) 50 mg tablet Take 50 mg by mouth every 6 (six) hours as needed for Pain.   More than a month at Unknown time       Physical Exam:    Vital Signs:   Vitals:    03/16/17 1017   BP: 132/80   Pulse: 80   Resp: 18   Temp: 98.2 °F (36.8 °C)       General Appearance: Well appearing in no acute distress  Lungs: CTA anteriorly  Heart:  Regular rate, S1, S2 normal, no murmurs heard.  Abdomen: Soft, non tender, non distended with normal bowel sounds, no masses  Extremities: No edema    Labs:  Lab Results   Component Value Date    WBC 2.92 (L) 03/08/2017    HGB 13.4 (L) 03/08/2017    HCT 38.6 (L) 03/08/2017    PLT 96 (L) 03/08/2017    ALT 26 03/08/2017    AST 60 (H) 03/08/2017     (L) 03/08/2017    K 3.8 03/08/2017     03/08/2017    CREATININE 0.8 03/08/2017    BUN 9 03/08/2017    CO2 24 03/08/2017    INR 1.4 (H) 03/08/2017       I have explained the risks and benefits of this endoscopic procedure to the patient including but not limited to bleeding, inflammation, infection, perforation, and death.      Juana King MD

## 2017-03-16 NOTE — ANESTHESIA RELEASE NOTE
Anesthesia Release from PACU Note    Patient: Fernando Garg    Procedure(s) Performed: Procedure(s) (LRB):  ESOPHAGOGASTRODUODENOSCOPY (EGD) (N/A)    Anesthesia type: general    Post pain: Adequate analgesia    Post assessment: no apparent anesthetic complications, tolerated procedure well and no evidence of recall    Last Vitals:   Visit Vitals    /76 (BP Location: Left arm, Patient Position: Sitting, BP Method: Automatic)    Pulse 72    Temp 36.5 °C (97.7 °F) (Oral)    Resp 15    Ht 6' (1.829 m)    Wt 74.8 kg (165 lb)    SpO2 98%    BMI 22.38 kg/m2       Post vital signs: stable    Level of consciousness: awake, alert  and oriented    Nausea/Vomiting: no nausea/no vomiting    Complications: none    Airway Patency: patent    Respiratory: unassisted, spontaneous ventilation, room air    Cardiovascular: stable and blood pressure at baseline    Hydration: euvolemic

## 2017-03-16 NOTE — ANESTHESIA POSTPROCEDURE EVALUATION
Anesthesia Post Evaluation    Patient: Fernando Garg    Procedure(s) Performed: Procedure(s) (LRB):  ESOPHAGOGASTRODUODENOSCOPY (EGD) (N/A)    Final Anesthesia Type: general  Patient location during evaluation: GI PACU  Patient participation: Yes- Able to Participate  Level of consciousness: awake and alert and oriented  Post-procedure vital signs: reviewed and stable  Pain management: adequate  Airway patency: patent  PONV status at discharge: No PONV  Anesthetic complications: no      Cardiovascular status: hemodynamically stable  Respiratory status: unassisted, spontaneous ventilation and room air  Hydration status: euvolemic  Follow-up not needed.        Visit Vitals    /76 (BP Location: Left arm, Patient Position: Sitting, BP Method: Automatic)    Pulse 72    Temp 36.5 °C (97.7 °F) (Oral)    Resp 15    Ht 6' (1.829 m)    Wt 74.8 kg (165 lb)    SpO2 98%    BMI 22.38 kg/m2       Pain/Buzz Score: Pain Assessment Performed: Yes (3/16/2017 11:42 AM)  Presence of Pain: denies (3/16/2017 11:42 AM)  Buzz Score: 10 (3/16/2017 11:42 AM)

## 2017-03-16 NOTE — DISCHARGE INSTRUCTIONS
Esophageal Varices     With esophageal varices, blood vessels in the esophagus become abnormally enlarged. They may then burst (rupture) and bleed.   Esophageal varices are enlarged veins at the lower end of the esophagus. The esophagus is the tube that carries food from your mouth to your stomach. Varices most often occur because of problems with blood flow in the liver caused by chronic liver disease. Normally, a blood vessel called the portal vein carries blood from the digestive organs to the liver. But with liver disease, blood flow can be blocked due to scarring of the liver. This increases the blood pressure in the portal vein (a condition known as portal hypertension). Blood then backs up in nearby veins in the esophagus and stomach, causing varices. Varices are a serious and deadly problem. Treatment is needed to prevent them from bursting (rupturing) and bleeding. If bleeding occurs, it can be fatal.  Symptoms of esophageal varices  Symptoms do not occur unless the varices are bleeding. This is an emergency problem. If you have any of the following symptoms, get medical attention right away:  · Vomiting blood  · Black, tarry, or bloody stools  · Feeling lightheaded, or fainting (loss of consciousness)  Diagnosing esophageal \varices  Youll likely be checked for varices if you have liver disease or other health problems that can cause them. Your healthcare provider will ask about your symptoms and health history. Youll also be examined. Tests are then done to confirm the problem. Tests can include:  · Upper endoscopy. This is done to see inside the upper digestive tract. During the test, an endoscope is used. This is a thin, flexible tube with a tiny camera on the end. Its inserted through your mouth. Its then guided down through your esophagus, stomach, and first part of your small intestine. This allows the provider to check for varices and find any bleeding.  · Imaging tests. These provide pictures  of the liver or blood flow in the liver. They allow the provider to check for enlarged veins around the liver and assess the risk of bleeding. Common imaging tests done include ultrasound and CT scans.  Treating esophageal varices  The goal of treatment is to reduce the risk of bleeding or to control bleeding. Treatment can include 1 or more of the following:  · Medicines. These may be prescribed to lower the blood pressure inside the enlarged veins. This reduces the risk of bleeding. Beta-blockers are the most common medicine used.  · Endoscopic therapy. These are treatments for enlarged or bleeding veins that are done using an endoscope. With ligation, small rubber bands are placed around the veins to close them off and stop any bleeding. With sclerotherapy, a blood-clotting medicine is injected into the veins to cause scarring and shrink them.  · Balloon tamponade. A tube with a balloon is guided down into your esophagus and stomach. The balloon is then filled with air. This puts pressure on enlarged or bleeding veins to control bleeding. This is a short-term (temporary) way to control bleeding until other treatments are available.   · Surgery. This may be done to place a tubelike device (stent) in the liver. The stent helps redirect blood flow in the liver to lower the blood pressure in enlarged veins. Sometimes, the enlarged veins may be connected to other nearby veins to redirect blood flow. In severe cases, a liver transplant may be needed. For this surgery, a diseased liver is replaced with a healthy liver from another person.   Follow-up  Regular visits with your provider are needed to check for bleeding of the varices. If bleeding occurs, it is likely to occur again. More treatments will then be needed in the future. Once endoscopic therapy (banding) is performed, regular follow-up endoscopic scans with banding are done to completely get rid of the varices. If you are given medicines to take by mouth, be  sure to take them as directed. Work closely with your provider to manage your condition. Know when to seek emergency care.  Date Last Reviewed: 7/1/2016  © 5789-9993 [employer] [address] [place] [address], [place] [address]. All rights reserved. This information is not intended as a substitute for professional medical care. Always follow your healthcare professional's instructions.

## 2017-03-20 ENCOUNTER — LAB VISIT (OUTPATIENT)
Dept: LAB | Facility: HOSPITAL | Age: 67
End: 2017-03-20
Attending: FAMILY MEDICINE
Payer: MEDICARE

## 2017-03-20 DIAGNOSIS — R18.8 OTHER ASCITES: ICD-10-CM

## 2017-03-20 DIAGNOSIS — K74.60 CIRRHOSIS OF LIVER WITHOUT ASCITES, UNSPECIFIED HEPATIC CIRRHOSIS TYPE: ICD-10-CM

## 2017-03-20 DIAGNOSIS — R19.7 DIARRHEA, UNSPECIFIED TYPE: ICD-10-CM

## 2017-03-20 PROCEDURE — 87449 NOS EACH ORGANISM AG IA: CPT

## 2017-03-21 ENCOUNTER — TELEPHONE (OUTPATIENT)
Dept: HEPATOLOGY | Facility: CLINIC | Age: 67
End: 2017-03-21

## 2017-03-21 DIAGNOSIS — R16.0 LIVER MASSES: Primary | ICD-10-CM

## 2017-03-21 DIAGNOSIS — K74.60 CIRRHOSIS OF LIVER WITHOUT ASCITES, UNSPECIFIED HEPATIC CIRRHOSIS TYPE: ICD-10-CM

## 2017-03-21 LAB
C DIFF GDH STL QL: NEGATIVE
C DIFF TOX A+B STL QL IA: NEGATIVE

## 2017-03-21 NOTE — TELEPHONE ENCOUNTER
MA attempted to call patient, he is unable to reached left him VM to please give us a callback.SANDRA

## 2017-03-21 NOTE — TELEPHONE ENCOUNTER
Pt's case discussed with Dr. Breaux. He advised to do an MRI also. Please call pt to let him know we want to do an MRI to look at his liver lesions more closely. Can we try to schedule this for him next Monday here at [facility] before his f/u appt with me if at all possible. If not, can do on NS.

## 2017-03-22 NOTE — TELEPHONE ENCOUNTER
MA attempted to call patient again, able to speak to his wife, inform wife to please have patient to call us back to schedule his MRI. Wife don't know if he need sedation or not. SANDRA

## 2017-03-22 NOTE — TELEPHONE ENCOUNTER
MA spoke with patient message given, pt verbalized understanding.     Pt require sedation and unable to get transportation to get here and back, MRI schedule in [place]. Next available is 04/04/2017 @ 1130am,

## 2017-03-23 ENCOUNTER — TELEPHONE (OUTPATIENT)
Dept: ENDOSCOPY | Facility: HOSPITAL | Age: 67
End: 2017-03-23

## 2017-03-27 ENCOUNTER — OFFICE VISIT (OUTPATIENT)
Dept: HEPATOLOGY | Facility: CLINIC | Age: 67
End: 2017-03-27
Payer: MEDICARE

## 2017-03-27 VITALS
DIASTOLIC BLOOD PRESSURE: 72 MMHG | TEMPERATURE: 98 F | HEIGHT: 72 IN | HEART RATE: 81 BPM | WEIGHT: 171.75 LBS | BODY MASS INDEX: 23.26 KG/M2 | SYSTOLIC BLOOD PRESSURE: 119 MMHG | OXYGEN SATURATION: 99 %

## 2017-03-27 DIAGNOSIS — E83.119 HEMOCHROMATOSIS, UNSPECIFIED HEMOCHROMATOSIS TYPE: ICD-10-CM

## 2017-03-27 DIAGNOSIS — R18.8 CIRRHOSIS OF LIVER WITH ASCITES, UNSPECIFIED HEPATIC CIRRHOSIS TYPE: ICD-10-CM

## 2017-03-27 DIAGNOSIS — Z78.9 ALCOHOL USE: ICD-10-CM

## 2017-03-27 DIAGNOSIS — R16.0 LIVER MASSES: ICD-10-CM

## 2017-03-27 DIAGNOSIS — K59.00 CONSTIPATION, UNSPECIFIED CONSTIPATION TYPE: ICD-10-CM

## 2017-03-27 DIAGNOSIS — K74.60 CIRRHOSIS OF LIVER WITH ASCITES, UNSPECIFIED HEPATIC CIRRHOSIS TYPE: ICD-10-CM

## 2017-03-27 DIAGNOSIS — R18.8 OTHER ASCITES: ICD-10-CM

## 2017-03-27 DIAGNOSIS — K20.90 ESOPHAGITIS: ICD-10-CM

## 2017-03-27 DIAGNOSIS — K74.60 CIRRHOSIS OF LIVER WITHOUT ASCITES, UNSPECIFIED HEPATIC CIRRHOSIS TYPE: Primary | ICD-10-CM

## 2017-03-27 PROCEDURE — 99214 OFFICE O/P EST MOD 30 MIN: CPT | Mod: PBBFAC | Performed by: NURSE PRACTITIONER

## 2017-03-27 PROCEDURE — 99215 OFFICE O/P EST HI 40 MIN: CPT | Mod: S$PBB,,, | Performed by: NURSE PRACTITIONER

## 2017-03-27 PROCEDURE — 99999 PR PBB SHADOW E&M-EST. PATIENT-LVL IV: CPT | Mod: PBBFAC,,, | Performed by: NURSE PRACTITIONER

## 2017-03-27 RX ORDER — LACTULOSE 10 G/15ML
20 SOLUTION ORAL 2 TIMES DAILY
Qty: 500 ML | Refills: 1 | Status: SHIPPED | OUTPATIENT
Start: 2017-03-27 | End: 2017-04-24

## 2017-03-27 NOTE — MR AVS SNAPSHOT
[facility]  [address]  [facility] [address]  Phone: [phone]  Fax: [phone]                  Fernando Garg   3/27/2017 3:40 PM   Office Visit    Description:  Male : 1950   Provider:  Nasima Ro NP   Department:  [facility]           Reason for Visit     Cirrhosis           Diagnoses this Visit        Comments    Cirrhosis of liver without ascites, unspecified hepatic cirrhosis type    -  Primary            To Do List           Future Appointments        Provider Department Dept Phone    2017 11:30 AM [facility] MRI1 [facility] [phone]      Goals (5 Years of Data)     None       These Medications        Disp Refills Start End    lactulose (CHRONULAC) 20 gram/30 mL Soln 500 mL 1 3/27/2017 3/27/2018    Take 30 mLs (20 g total) by mouth 2 (two) times daily. Goal of 3-4 BM's daily - Oral    Pharmacy: [facility] [address] [place] [address] [address] Ph #: [phone]         [facility]     [facility] Care [facility] -  Assistance  Registered nurses in the [facility] provide clinical advisement, health education, appointment booking, and other advisory services.  Call for this free service at [phone].             Medications           Message regarding Medications     Verify the changes and/or additions to your medication regime listed below are the same as discussed with your clinician today.  If any of these changes or additions are incorrect, please notify your healthcare provider.        START taking these NEW medications        Refills    lactulose (CHRONULAC) 20 gram/30 mL Soln 1    Sig: Take 30 mLs (20 g total) by mouth 2 (two) times daily. Goal of 3-4 BM's daily    Class: Normal    Route: Oral           Verify that the below list of medications is an accurate representation of the medications you are currently taking.  If none reported, the list  may be blank. If incorrect, please contact your healthcare provider. Carry this list with you in case of emergency.           Current Medications     clonazePAM (KLONOPIN) 0.5 MG tablet Take 0.5 mg by mouth 2 (two) times daily as needed for Anxiety.    furosemide (LASIX) 20 MG tablet Take 1 tablet (20 mg total) by mouth once daily.    gabapentin (NEURONTIN) 300 MG capsule Take 300 mg by mouth 3 (three) times daily.    Lactobacillus rhamnosus GG (CULTURELLE) 10 billion cell capsule Take 1 capsule by mouth once daily.    spironolactone (ALDACTONE) 50 MG tablet Take 1 tablet (50 mg total) by mouth once daily.    tamsulosin (FLOMAX) 0.4 mg Cp24 Take 0.4 mg by mouth once daily.    tramadol (ULTRAM) 50 mg tablet Take 50 mg by mouth every 6 (six) hours as needed for Pain.    lactulose (CHRONULAC) 20 gram/30 mL Soln Take 30 mLs (20 g total) by mouth 2 (two) times daily. Goal of 3-4 BM's daily           Clinical Reference Information           Your Vitals Were     BP Pulse Temp Height Weight SpO2    119/72 (BP Location: Left arm, Patient Position: Sitting, BP Method: Automatic) 81 98.1 °F (36.7 °C) (Oral) 6' (1.829 m) 77.9 kg (171 lb 11.8 oz) 99%    BMI                23.29 kg/m2          Blood Pressure          Most Recent Value    BP  119/72      Allergies as of 3/27/2017     Sulfa (Sulfonamide Antibiotics)      Immunizations Administered on Date of Encounter - 3/27/2017     None      Orders Placed During Today's Visit     Future Labs/Procedures Expected by Expires    Comprehensive metabolic panel  As directed 3/27/2018    Protime-INR  As directed 3/27/2018      Maintenance Dialysis History     Patient has no recorded history of maintenance dialysis.      [facility] Sign-Up     Activating your [facility] account is as easy as 1-2-3!     1) Visit [email or web address], select Sign Up Now, enter this activation code and your date of birth, then select Next.  [email or web address]  Expires: 4/22/2017 10:50 AM      2) Create a username and  password to use when you visit [facility] in the future and select a security question in case you lose your password and select Next.    3) Enter your e-mail address and click Sign Up!    Additional Information  If you have questions, please e-mail [email or web address] or call [phone] to talk to our [employer] staff. Remember, [facility] is NOT to be used for urgent needs. For medical emergencies, dial 911.         Language Assistance Services     ATTENTION: Language assistance services are available, free of charge. Please call [phone].      ATENCIÓN: Si habla español, tiene a moreno disposición servicios gratuitos de asistencia lingüística. Llame al [phone].     CHÚ Ý: N?u b?n nói Ti?ng Vi?t, có các d?ch v? h? tr? ngôn ng? mi?n phí dành cho b?n. G?i s? [phone].         [facility] complies with applicable Federal civil rights laws and does not discriminate on the basis of race, color, national origin, age, disability, or sex.

## 2017-03-27 NOTE — PROGRESS NOTES
[facility] Established Patient Visit    Reason for Visit:  F/u cirrhosis    PCP: Gilo Kawasaki    HPI:  This is a 66 y.o. 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 Cirrhosis diagnosed at initial visit based on u/s that revealed findings of cirrhosis with nodular liver, cirrhosis, enlarged portal vein, and splenomegaly. Ascites noted. He had labs also done that showed elevated Tbili of 2.5, AST 60, ALT 28. Pancytopenia on CBC. Plts low at 84. [facility] He never had a liver biopsy with them. His serological workup was negative for Westley's, alpha-1 antitrypsin deficiency, autoimmune etiology, and viral hepatitis. His ferritin is elevated at 1696 with elevated serum iron and iron sat. 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 No copies of H63D. His ASMA was (+) at 1:40 and IgG elevated at 2348 but AMA and TUAN negative. He denied drinking any alcohol in the past year but his PETH was (+) at 180 indicating recent alcohol intake.     Questioned pt again today about his alcohol intake. He tells me that he only had a toast of champagne at a wedding in February. At initial visit, he reported h/o alcohol use in the past, 3-4 beers a few times a week at the most. He denied any h/o daily or heavy alcohol use. Discussed the results of PETH with him and explained that this test indicates he has had recent alcohol intake and we will continue to monitor this. Reiterated need for complete abstinence of alcohol.      His MELD was elevated at 16 on initial labs. We initiated clearance for liver transplant evaluation based on this. He underwent diagnostic and therapeutic paracentesis on 3/9/17 that removed 2.6 L. SAAG > 1.1 indicating portal HTN etiology. He was started on lasix 20 mg and spironolactone 50 mg daily on 3/14/17. Weight is up 4 lbs since initial visit. He c/o abd distention today. He reports following a low Na diet.      TPCT on 3/8 revealed 3 indeterminate liver lesions, all 1 cm or less, all hyperenhancing. One with washout on delayed phase. He has MRI scheduled soon for further evaluation. He will be reviewed in IR conference after he has MRI. AFP is normal.     He had EGD on 3/16/17 and no varices seen. (+) PHG. LA grade B reflux esophagitis noted. Consider starting PPI was advised. Pt c/o abd bloating today and has not had BM in 3 days. He tried a laxative the pharmacist recommended but barely went.     [facility].     Denies jaundice, dark urine, hematemesis, melena, slowed mentation. He still plans on going on his cruise to [facility] in May to get . Will be gone for 7 days.       ROS:   GENERAL: Denies fever, chills, (+) weight gain, (+) fatigue  HEENT: Denies headaches, dizziness, vision/hearing changes  CARDIOVASCULAR: Denies chest pain, palpitations, (+) edema  RESPIRATORY: Denies dyspnea, cough  GI: (+) abdominal pain, no rectal bleeding, no nausea, vomiting. (+) constipation  : Denies dysuria, hematuria   SKIN: Denies rash, itching   NEURO: Denies confusion, memory loss, or mood changes  PSYCH: Denies depression or anxiety  HEME/LYMPH: Denies easy bruising or bleeding      PMHX:  has a past medical history of BPH (benign prostatic hyperplasia); Cirrhosis of liver with ascites (3/8/2017); Hemochromatosis; and Neuropathy.    PSHX:  has a past surgical history that includes Hernia repair; Colonoscopy (2012); and Upper gastrointestinal endoscopy.    The patient's social and family histories were reviewed by me and updated in the appropriate section of the electronic medical record.    Review of patient's allergies indicates:   Allergen Reactions    Sulfa (sulfonamide antibiotics) Nausea And Vomiting       Medications reviewed in Epic      Objective Findings:    PHYSICAL EXAM:   Friendly White male, in no acute  distress; alert and oriented to person, place and time  VITALS: /72 (BP Location: Left arm, Patient Position: Sitting, BP Method: Automatic)  Pulse 81  Temp 98.1 °F (36.7 °C) (Oral)   Ht 6' (1.829 m)  Wt 77.9 kg (171 lb 11.8 oz)  SpO2 99%  BMI 23.29 kg/m2  HENT: Normocephalic, without obvious abnormality. Oral mucosa pink and moist. Dentition good.  EYES: Sclerae mildly icteric. No conjunctival pallor.   NECK: Supple. No masses or cervical adenopathy.  CARDIOVASCULAR: Regular rate and rhythm. No murmurs.  RESPIRATORY: Normal respiratory effort. BBS CTA. No wheezes or crackles.  GI: Soft, non-tender, mildly distended. (+) hepatosplenomegaly. No masses palpable. (+) ascites.  EXTREMITIES: No clubbing, cyanosis, (+) trace BLE edema.  SKIN: Warm and dry. No jaundice. (+) dark skin tone. No rashes noted to exposed skin. No telangectasias noted. (+) palmar erythema.  NEURO: Normal gate. No asterixis. (+) mild tremor  PSYCH: Memory intact. Thought and speech pattern appropriate. Behavior normal. No depression or anxiety noted.       Labs:  Lab Results   Component Value Date    WBC 2.92 (L) 03/08/2017    HGB 13.4 (L) 03/08/2017    HCT 38.6 (L) 03/08/2017    PLT 96 (L) 03/08/2017     (L) 03/08/2017    K 3.8 03/08/2017    CREATININE 0.8 03/08/2017    ALT 26 03/08/2017    AST 60 (H) 03/08/2017    ALKPHOS 96 03/08/2017    BILITOT 2.8 (H) 03/08/2017    ALBUMIN 2.9 (L) 03/08/2017    INR 1.4 (H) 03/08/2017    AFP 6.9 03/08/2017     TPCT 3/8/17  Findings:    The visualized portions of the lung bases are unremarkable.  Calcific plaque in the coronary artery distribution is noted.  No pleural or pericardial fluid is seen.    The aorta tapers appropriately, with mild atherosclerosis along its course and branch vessels.  The celiac axis, SMA, both renal arteries, and ERICA are patent.  There is standard hepatic arterial anatomy.  The superior mesenteric vein, main portal vein and its branches, splenic vein, and IVC are  patent.    There are morphologic changes of the liver consistent with cirrhosis including a nodular contour, widening of the fissures, and relative hypertrophy of the caudate lobe and left hepatic lobe.      Focal Liver Lesions:   *Lesions compatible with hepatocellular carcinoma: None     *Indeterminate liver lesions: Three as follows:   Lesion # 1 :   Lesion size: 1 cm (series 2 , image 36 ).   Lesion location: Segment 5 .   Hypervascularity: Yes   Washout: No   Capsule/pseudocapsule: No.   Change in lesion since prior exam: Not applicable. Baseline study.     Lesion # 2 :   Lesion size: 1 cm (series 2 , image 35 ).   Lesion location: Segment 5 .   Hypervascularity: Yes   Washout: Yes, only seen on delayed phase.   Capsule/pseudocapsule: No.   Change in lesion since prior exam: Not applicable. Baseline study.     Lesion # 3 :   Lesion size: 0.7 cm (series 2 , image 33 ).   Lesion location: Segment 5 .   Hypervascularity: Yes   Washout: Yes   Capsule/pseudocapsule: No.   Change in lesion since prior exam: Not applicable. Baseline study.     The gallbladder, biliary tree, spleen, left adrenal gland, pancreas, stomach, and duodenal C-loop are unremarkable.  There is calcification along the medial and lateral limbs of the right adrenal gland likely representing sequela of prior hemorrhage or TB.    There is circumferential mural thickening of the cecum and proximal ascending colon.  In the absence of diarrhea, fever, abdominal pain, or leukocytosis, this is likely related to portal colopathy.  Clinical correlation with patient's symptomatology and lab values is recommended.  The remaining loops of small and large bowel show no evidence of obstruction or inflammation.    There is a moderate volume of ascites throughout the abdomen.  There are small caliber portosystemic collateral vessels in the epigastrium and left upper quadrant.    The soft tissues of the body wall are unremarkable.    There is degenerative change  in the lumbar spine.  No acute displaced fracture or aggressive osseous lesion is seen.   Impression       Cirrhosis of the liver with 3 indeterminate liver lesions as noted above.  No lesions meet criteria for the diagnosis of HCC are seen.  The portal vein is patent.  Standard hepatic arterial anatomy.    Sequela of portal hypertension including moderate volume of ascites and portosystemic collateral vessels.  Spleen is not enlarged.    Circumferential mural thickening of the cecum and proximal ascending colon, likely secondary to portal colopathy.  In the correct clinical setting (diarrhea, fever, abdominal pain, and leukocytosis), this could represent a focal colitis.  Correlation with patient's symptomatology and lab values is recommended.    Incidental findings include the following:  - Calcification of the right adrenal gland which can be seen in setting of prior adrenal hemorrhage or tuberculosis infection.  - Mild aortic and coronary atherosclerosis.  - Degenerative changes of the lumbar spine.     EGD 3/16/17  Impression:           - LA Grade B reflux esophagitis.                        - Portal hypertensive gastropathy.                        - Normal examined duodenum.                        - No specimens collected.        ASSESSMENT:  66 y.o. White male with:  1.  Cirrhosis, decompensated with ascites, diagnosed based on imaging and lab findings of nodular liver, splenomegaly, ascites, elevated Tbili, thrombocytopenia  -- MELD - MELD-Na score: 16 at 3/8/2017 10:20 AM  MELD score: 14 at 3/8/2017 10:20 AM  Calculated from:  Serum Creatinine: 0.8 mg/dL (Rounded to 1) at 3/8/2017 10:20 AM  Serum Sodium: 135 mmol/L at 3/8/2017 10:20 AM  Total Bilirubin: 2.8 mg/dL at 3/8/2017 10:20 AM  INR(ratio): 1.4 at 3/8/2017 10:20 AM  Age: 66 years  -- HCC screening- AFP and abd. U/S - see below  -- (+) Immunity to Hep A and B per labs  -- EGD - 3/16/17 - no varices  2. Hemochromatosis  -- [facility]. 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 No copies of H63D  3. Ascites, new onset  -- SAAG > 1.1 c/w portal HTN etiology on diagnostic and therapeutic para  -- s/p para on 3/9/17 with 2.6 L removed. On lasix 20 mg and spironolactone 50 mg daily but had 4 lb weight gain since initial visit so will need to increase diuretics if labs ok  4. Liver masses, seen on TPCT  -- 3 indeterminate liver lesions, all 1 cm or less, all hyperenhancing. One with washout on delayed phase. He has MRI scheduled soon for further evaluation  -- AFP nl  5. Alcohol use, PETH (+) at initial visit  -- pt denied alcohol use in last year but then admitted to "drinking a toast of champagne" at a wedding in 2/2017  -- reiterated need for complete abstinence of alcohol with his cirrhosis  -- this could be contributing to his elevated ferritin levels since he is only a heterozygote for C282Y  -- will continue to monitor with next lab  -- has been referred for liver transplant evaluation d/t elevated MELD but needs to be abstinent of alcohol in order to undergo liver transplant eval  6. Esophagitis  -- will start PPI  7. Constipation  -- will Rx lactulose and he can take Miralax also      EDUCATION:   The disease process and manifestations of cirrhosis were discussed.    Signs and symptoms of hepatic decompensation were reviewed, including jaundice, ascites, and slowed mentation due to hepatic encephalopathy. The patient should seek medical attention if any of these things occur.  We discussed the potential for bleeding from esophageal varices with symptoms of hematemesis and melena. The patient should report to the Emergency Department for these symptoms.    We discussed the increased risk of hepatocellular carcinoma due to cirrhosis. Continued screening every six " months with ultrasound and AFP is recommended.     No alcohol or raw seafood.  Tylenol/acetaminophen as needed for pain, up to 2000 mg daily    Discussed use of the MELD score and its role in the management and determining the prognosis of cirrhosis.     Discussed need to abstain completely from alcohol since he has cirrhosis. Pt understands that continued alcohol abuse can be detrimental to his liver. Discussed potential outcomes of cirrhosis, including liver cancer, liver failure, liver transplant, death. Will not be a liver transplant candidate if he continues to drink.      PLAN:  1. Labs for CMP, INR. Pt requested to do tomorrow in [place]   2. MRI as scheduled  3. Start lactulose 30 cc BID for constipation  4. Can try Miralax OTC also  5. Omeprazole 20 mg Q AM  6. Will increase diuretics pending if labs ok tomorrow  7. [facility]   8. Avoid alcohol completely  9. Proceed with liver transplant evaluation  10. F/u with me in 4 weeks. Will continue to follow pt until he is seen in transplant clinic    Thank you for allowing me to participate in the care of Fernando FungOCTAVIO CortezC

## 2017-03-28 ENCOUNTER — LAB VISIT (OUTPATIENT)
Dept: LAB | Facility: HOSPITAL | Age: 67
End: 2017-03-28
Attending: NURSE PRACTITIONER
Payer: MEDICARE

## 2017-03-28 DIAGNOSIS — K74.60 CIRRHOSIS OF LIVER WITHOUT ASCITES, UNSPECIFIED HEPATIC CIRRHOSIS TYPE: ICD-10-CM

## 2017-03-28 PROBLEM — R16.0 LIVER MASSES: Status: ACTIVE | Noted: 2017-03-28

## 2017-03-28 PROBLEM — Z78.9 ALCOHOL USE: Status: ACTIVE | Noted: 2017-03-28

## 2017-03-28 LAB
ALBUMIN SERPL BCP-MCNC: 2.6 G/DL
ALP SERPL-CCNC: 99 U/L
ALT SERPL W/O P-5'-P-CCNC: 23 U/L
ANION GAP SERPL CALC-SCNC: 6 MMOL/L
AST SERPL-CCNC: 52 U/L
BILIRUB SERPL-MCNC: 2 MG/DL
BUN SERPL-MCNC: 11 MG/DL
CALCIUM SERPL-MCNC: 8.6 MG/DL
CHLORIDE SERPL-SCNC: 105 MMOL/L
CO2 SERPL-SCNC: 25 MMOL/L
CREAT SERPL-MCNC: 0.9 MG/DL
EST. GFR  (AFRICAN AMERICAN): >60 ML/MIN/1.73 M^2
EST. GFR  (NON AFRICAN AMERICAN): >60 ML/MIN/1.73 M^2
GLUCOSE SERPL-MCNC: 109 MG/DL
INR PPP: 1.4
POTASSIUM SERPL-SCNC: 4.6 MMOL/L
PROT SERPL-MCNC: 6.8 G/DL
PROTHROMBIN TIME: 14.5 SEC
SODIUM SERPL-SCNC: 136 MMOL/L

## 2017-03-28 PROCEDURE — 36415 COLL VENOUS BLD VENIPUNCTURE: CPT | Mod: PO

## 2017-03-28 PROCEDURE — 80053 COMPREHEN METABOLIC PANEL: CPT

## 2017-03-28 PROCEDURE — 85610 PROTHROMBIN TIME: CPT | Mod: PO

## 2017-03-28 RX ORDER — OMEPRAZOLE 20 MG/1
20 CAPSULE, DELAYED RELEASE ORAL DAILY
Qty: 30 CAPSULE | Refills: 3 | Status: SHIPPED | OUTPATIENT
Start: 2017-03-28 | End: 2017-04-24

## 2017-03-29 ENCOUNTER — TELEPHONE (OUTPATIENT)
Dept: HEPATOLOGY | Facility: CLINIC | Age: 67
End: 2017-03-29

## 2017-03-29 DIAGNOSIS — K74.60 CIRRHOSIS OF LIVER WITHOUT ASCITES, UNSPECIFIED HEPATIC CIRRHOSIS TYPE: ICD-10-CM

## 2017-03-29 DIAGNOSIS — R18.8 OTHER ASCITES: ICD-10-CM

## 2017-03-29 RX ORDER — FUROSEMIDE 40 MG/1
40 TABLET ORAL DAILY
Qty: 30 TABLET | Refills: 1 | Status: SHIPPED | OUTPATIENT
Start: 2017-03-29 | End: 2017-04-04 | Stop reason: SDUPTHER

## 2017-03-29 RX ORDER — SPIRONOLACTONE 100 MG/1
100 TABLET, FILM COATED ORAL DAILY
Qty: 30 TABLET | Refills: 1 | Status: SHIPPED | OUTPATIENT
Start: 2017-03-29 | End: 2017-04-04 | Stop reason: SDUPTHER

## 2017-03-29 NOTE — TELEPHONE ENCOUNTER
Labs reviewed. Cr and K stable. Will increase diuretics to lasix 40 mg and spironolactone 100 mg daily. Rx for increased dose sent to pharmacy. Called to notify pt. No answer. Left  for call back.    Pt called yesterday and told me that he did not disclose at his visit with me that he has been drinking non alcoholic beer. He was advised to d/c all alcohol intake including the non alcoholic beer because it has a trivial amount of alcohol in it. Pt understood.     Please call pt to schedule weekly lab for BMP x next 4 weeks in [place] until he is seen again so I can adjust diuretics and to monitor Cr and K. Pt wants to do MRI here at [facility] with sedation. Please reschedule this for him as well.

## 2017-03-29 NOTE — TELEPHONE ENCOUNTER
MA spoke with pt's wife. She stated  is sleep and not feeling god.     Message given to wife, who understood and verbalized understanding. CT reschedule, weekly labs schedule. Appt letter and results note mailed to patient.

## 2017-04-03 ENCOUNTER — LAB VISIT (OUTPATIENT)
Dept: LAB | Facility: HOSPITAL | Age: 67
End: 2017-04-03
Attending: FAMILY MEDICINE
Payer: MEDICARE

## 2017-04-03 DIAGNOSIS — R18.8 OTHER ASCITES: ICD-10-CM

## 2017-04-03 DIAGNOSIS — K74.60 CIRRHOSIS OF LIVER WITHOUT ASCITES, UNSPECIFIED HEPATIC CIRRHOSIS TYPE: ICD-10-CM

## 2017-04-03 LAB
ANION GAP SERPL CALC-SCNC: 4 MMOL/L
BUN SERPL-MCNC: 10 MG/DL
CALCIUM SERPL-MCNC: 8.9 MG/DL
CHLORIDE SERPL-SCNC: 105 MMOL/L
CO2 SERPL-SCNC: 27 MMOL/L
CREAT SERPL-MCNC: 1.1 MG/DL
EST. GFR  (AFRICAN AMERICAN): >60 ML/MIN/1.73 M^2
EST. GFR  (NON AFRICAN AMERICAN): >60 ML/MIN/1.73 M^2
GLUCOSE SERPL-MCNC: 126 MG/DL
POTASSIUM SERPL-SCNC: 4.8 MMOL/L
SODIUM SERPL-SCNC: 136 MMOL/L

## 2017-04-03 PROCEDURE — 80048 BASIC METABOLIC PNL TOTAL CA: CPT

## 2017-04-03 PROCEDURE — 36415 COLL VENOUS BLD VENIPUNCTURE: CPT | Mod: PO

## 2017-04-04 ENCOUNTER — TELEPHONE (OUTPATIENT)
Dept: HEPATOLOGY | Facility: CLINIC | Age: 67
End: 2017-04-04

## 2017-04-04 DIAGNOSIS — K74.60 CIRRHOSIS OF LIVER WITHOUT ASCITES, UNSPECIFIED HEPATIC CIRRHOSIS TYPE: ICD-10-CM

## 2017-04-04 DIAGNOSIS — R18.8 OTHER ASCITES: ICD-10-CM

## 2017-04-04 RX ORDER — FUROSEMIDE 40 MG/1
60 TABLET ORAL DAILY
Qty: 30 TABLET | Refills: 1
Start: 2017-04-04 | End: 2017-04-18 | Stop reason: SDUPTHER

## 2017-04-04 RX ORDER — SPIRONOLACTONE 100 MG/1
150 TABLET, FILM COATED ORAL DAILY
Qty: 30 TABLET | Refills: 1
Start: 2017-04-04 | End: 2017-04-18 | Stop reason: SDUPTHER

## 2017-04-04 NOTE — TELEPHONE ENCOUNTER
Labs reviewed. Cr and K WNL and stable. Called pt he reports swelling is still present and about the same. Will increase diuretics to lasix 60 mg and spironolactone 150 mg daily. Pt verbalized understanding.

## 2017-04-10 ENCOUNTER — LAB VISIT (OUTPATIENT)
Dept: LAB | Facility: HOSPITAL | Age: 67
End: 2017-04-10
Attending: FAMILY MEDICINE
Payer: MEDICARE

## 2017-04-10 DIAGNOSIS — R18.8 OTHER ASCITES: ICD-10-CM

## 2017-04-10 DIAGNOSIS — K74.60 CIRRHOSIS OF LIVER WITHOUT ASCITES, UNSPECIFIED HEPATIC CIRRHOSIS TYPE: ICD-10-CM

## 2017-04-10 LAB
ANION GAP SERPL CALC-SCNC: 8 MMOL/L
BUN SERPL-MCNC: 10 MG/DL
CALCIUM SERPL-MCNC: 8.6 MG/DL
CHLORIDE SERPL-SCNC: 101 MMOL/L
CO2 SERPL-SCNC: 23 MMOL/L
CREAT SERPL-MCNC: 0.9 MG/DL
EST. GFR  (AFRICAN AMERICAN): >60 ML/MIN/1.73 M^2
EST. GFR  (NON AFRICAN AMERICAN): >60 ML/MIN/1.73 M^2
GLUCOSE SERPL-MCNC: 119 MG/DL
POTASSIUM SERPL-SCNC: 3.5 MMOL/L
SODIUM SERPL-SCNC: 132 MMOL/L

## 2017-04-10 PROCEDURE — 36415 COLL VENOUS BLD VENIPUNCTURE: CPT | Mod: PO

## 2017-04-10 PROCEDURE — 80048 BASIC METABOLIC PNL TOTAL CA: CPT

## 2017-04-11 ENCOUNTER — HOSPITAL ENCOUNTER (OUTPATIENT)
Dept: RADIOLOGY | Facility: HOSPITAL | Age: 67
Discharge: HOME | End: 2017-04-11
Attending: NURSE PRACTITIONER
Payer: MEDICARE

## 2017-04-11 DIAGNOSIS — K74.60 CIRRHOSIS OF LIVER WITHOUT ASCITES, UNSPECIFIED HEPATIC CIRRHOSIS TYPE: ICD-10-CM

## 2017-04-11 DIAGNOSIS — R16.0 LIVER MASSES: ICD-10-CM

## 2017-04-11 PROCEDURE — 74183 MRI ABD W/O CNTR FLWD CNTR: CPT | Mod: TC

## 2017-04-11 PROCEDURE — 74183 MRI ABD W/O CNTR FLWD CNTR: CPT | Mod: 26,GC,, | Performed by: RADIOLOGY

## 2017-04-11 PROCEDURE — 63600175 PHARM REV CODE 636 W HCPCS: Performed by: RADIOLOGY

## 2017-04-11 PROCEDURE — A9585 GADOBUTROL INJECTION: HCPCS | Performed by: NURSE PRACTITIONER

## 2017-04-11 PROCEDURE — 25500020 PHARM REV CODE 255: Performed by: NURSE PRACTITIONER

## 2017-04-11 RX ORDER — GADOBUTROL 604.72 MG/ML
10 INJECTION INTRAVENOUS
Status: COMPLETED | OUTPATIENT
Start: 2017-04-11 | End: 2017-04-11

## 2017-04-11 RX ORDER — MIDAZOLAM HYDROCHLORIDE 1 MG/ML
2 INJECTION, SOLUTION INTRAMUSCULAR; INTRAVENOUS ONCE
Status: COMPLETED | OUTPATIENT
Start: 2017-04-11 | End: 2017-04-11

## 2017-04-11 RX ADMIN — MIDAZOLAM HYDROCHLORIDE 2 MG: 1 INJECTION, SOLUTION INTRAMUSCULAR; INTRAVENOUS at 01:04

## 2017-04-11 RX ADMIN — GADOBUTROL 10 ML: 604.72 INJECTION INTRAVENOUS at 02:04

## 2017-04-11 NOTE — PROGRESS NOTES
2 mg of Versed given.  Allergies reviewed.  Pt stated has ride home.  Left in care of MRI staff.

## 2017-04-12 ENCOUNTER — TELEPHONE (OUTPATIENT)
Dept: HEPATOLOGY | Facility: CLINIC | Age: 67
End: 2017-04-12

## 2017-04-12 NOTE — TELEPHONE ENCOUNTER
Labs reviewed. Stable. Will continue same doses of diuretics, lasix 60 mg and spironolactone 150 mg daily. Discussed mri results, indeterminate lesions. Will have scans reviewed in IR conference next week. He has f/u with me on 4/24. Will keep appts as scheduled. Pt verbalized understanding.

## 2017-04-17 ENCOUNTER — CONFERENCE (OUTPATIENT)
Dept: TRANSPLANT | Facility: CLINIC | Age: 67
End: 2017-04-17
Payer: MEDICARE

## 2017-04-17 ENCOUNTER — LAB VISIT (OUTPATIENT)
Dept: LAB | Facility: HOSPITAL | Age: 67
End: 2017-04-17
Attending: FAMILY MEDICINE
Payer: MEDICARE

## 2017-04-17 DIAGNOSIS — R18.8 OTHER ASCITES: ICD-10-CM

## 2017-04-17 DIAGNOSIS — K74.69 OTHER CIRRHOSIS OF LIVER: ICD-10-CM

## 2017-04-17 DIAGNOSIS — K74.60 CIRRHOSIS OF LIVER WITHOUT ASCITES, UNSPECIFIED HEPATIC CIRRHOSIS TYPE: ICD-10-CM

## 2017-04-17 DIAGNOSIS — K76.9 LIVER LESION: Primary | ICD-10-CM

## 2017-04-17 LAB
ANION GAP SERPL CALC-SCNC: 8 MMOL/L
BUN SERPL-MCNC: 10 MG/DL
CALCIUM SERPL-MCNC: 8.5 MG/DL
CHLORIDE SERPL-SCNC: 102 MMOL/L
CO2 SERPL-SCNC: 25 MMOL/L
CREAT SERPL-MCNC: 1 MG/DL
EST. GFR  (AFRICAN AMERICAN): >60 ML/MIN/1.73 M^2
EST. GFR  (NON AFRICAN AMERICAN): >60 ML/MIN/1.73 M^2
GLUCOSE SERPL-MCNC: 122 MG/DL
POTASSIUM SERPL-SCNC: 3.4 MMOL/L
SODIUM SERPL-SCNC: 135 MMOL/L

## 2017-04-17 PROCEDURE — 80048 BASIC METABOLIC PNL TOTAL CA: CPT

## 2017-04-17 PROCEDURE — 36415 COLL VENOUS BLD VENIPUNCTURE: CPT | Mod: PO

## 2017-04-18 ENCOUNTER — TELEPHONE (OUTPATIENT)
Dept: HEPATOLOGY | Facility: CLINIC | Age: 67
End: 2017-04-18

## 2017-04-18 DIAGNOSIS — R18.8 OTHER ASCITES: ICD-10-CM

## 2017-04-18 DIAGNOSIS — K74.60 CIRRHOSIS OF LIVER WITHOUT ASCITES, UNSPECIFIED HEPATIC CIRRHOSIS TYPE: ICD-10-CM

## 2017-04-18 RX ORDER — SPIRONOLACTONE 100 MG/1
200 TABLET, FILM COATED ORAL DAILY
Qty: 60 TABLET | Refills: 1 | Status: SHIPPED | OUTPATIENT
Start: 2017-04-18 | End: 2017-06-09 | Stop reason: SDUPTHER

## 2017-04-18 RX ORDER — FUROSEMIDE 40 MG/1
60 TABLET ORAL DAILY
Qty: 45 TABLET | Refills: 1 | Status: SHIPPED | OUTPATIENT
Start: 2017-04-18 | End: 2017-06-09 | Stop reason: SDUPTHER

## 2017-04-18 NOTE — TELEPHONE ENCOUNTER
Fernando Garg  [id number]    Presenting Provider:     Presenting Radiologist: Ty Garcia    Hepatologist: Nasima Ro    Indication for review: MRI to further evaluate (3) sub-2 cm lesions seen on CT  Blood Type: Unavailable  Diagnosis: Hemochromatosis  Listing status: Hepatology clinic     Summary: 67 yo with newly diagnosed cirrhosis likely due to HH. He is decompensated with ascites. [facility] and [facility] at [facility] for abd swelling and was referred to me. We made cirrhosis diagnosis at initial visit. TPCT was ordered that revealed 3 small liver lesions. AFP nl 6.9. His MELD was 16 on initial labs so he is being referred for transplant evaluation.    Additional Information:    Last IR Review: N/A    Labs:  Lab Results for Liver Discussion Latest Ref Rng & Units 4/17/2017 4/10/2017 4/3/2017 3/28/2017 3/8/2017 3/1/2017   MELD/PELD - - - - 13 14 -   Creatinine 0.5 - 1.4 mg/dL 1.0 0.9 1.1 0.9 0.8 0.9   Total Bilirubin 0.1 - 1.0 mg/dL - - - 2.0(H) 2.8(H) 2.5(H)   AST 10 - 40 U/L - - - 52(H) 60(H) 60(H)   ALT 10 - 44 U/L - - - 23 26 28   Alk Phos 55 - 135 U/L - - - 99 96 103   INR 0.8 - 1.2 - - - 1.4(H) 1.4(H) -   Platelets 150 - 350 K/uL - - - - 96(L) 84(L)   AFP 0.0 - 8.4 ng/mL - - - - 6.9 -   Albumin 3.5 - 5.2 g/dL - - - 2.6(L) 2.9(L) 3.0(L)   Sodium 136 - 145 mmol/L 135(L) 132(L) 136 136 135(L) 133(L)         Original Imaging:  TPCT 3/8/17  Focal Liver Lesions:   *Lesions compatible with hepatocellular carcinoma: None     *Indeterminate liver lesions: Three as follows:   Lesion # 1 :   Lesion size: 1 cm (series 2 , image 36 ).   Lesion location: Segment 5 .   Hypervascularity: Yes   Washout: No   Capsule/pseudocapsule: No.   Change in lesion since prior exam: Not applicable. Baseline study.     Lesion # 2 :   Lesion size: 1 cm (series 2 , image 35 ).   Lesion location: Segment 5 .   Hypervascularity: Yes   Washout: Yes, only seen on delayed phase.   Capsule/pseudocapsule: No.    Change in lesion since prior exam: Not applicable. Baseline study.     Lesion # 3 :   Lesion size: 0.7 cm (series 2 , image 33 ).   Lesion location: Segment 5 .   Hypervascularity: Yes   Washout: Yes   Capsule/pseudocapsule: No.   Change in lesion since prior exam: Not applicable. Baseline study.    Current Imaging:  MRI 4/11/17:  3 arterial hyperenhancing lesions identified within hepatic segment V best seen on comparison CT exam with the largest measuring approximately 1.5 cm in maximal diameter.  No evidence of washout or pseudocapsule is identified.  No new enhancing lesions are identified.    Interventions:  [place]:  Date:   Number and size of lesions:  Characteristics:   Nexavar:  no    Discussion:  3 lesions still indeterminate on MRI- arterially enhancing, no washout or pseudocapsule.   Plan:  Continued close surveillance    Orders for surveillance MRI/labs in 3 months (July 2017) entered.

## 2017-04-18 NOTE — TELEPHONE ENCOUNTER
MA called patient to inform him that he is scheduled for PARA 4/25/17 Tuesday at 9:00 am. Patient accepted the appt. SANDRA

## 2017-04-18 NOTE — TELEPHONE ENCOUNTER
Labs reviewed. Called pt. He reports still swelling and would like a paracentesis. He has appt with me next week. K slightly low. Will increase spironolactone to 200 mg daily and keep lasix 60 mg daily. Rx's sent. Pt verbalized understanding.

## 2017-04-24 ENCOUNTER — OFFICE VISIT (OUTPATIENT)
Dept: HEPATOLOGY | Facility: CLINIC | Age: 67
End: 2017-04-24
Payer: MEDICARE

## 2017-04-24 ENCOUNTER — LAB VISIT (OUTPATIENT)
Dept: LAB | Facility: HOSPITAL | Age: 67
End: 2017-04-24
Attending: FAMILY MEDICINE
Payer: MEDICARE

## 2017-04-24 VITALS
HEART RATE: 84 BPM | WEIGHT: 156.94 LBS | BODY MASS INDEX: 21.26 KG/M2 | OXYGEN SATURATION: 97 % | RESPIRATION RATE: 20 BRPM | DIASTOLIC BLOOD PRESSURE: 80 MMHG | TEMPERATURE: 98 F | SYSTOLIC BLOOD PRESSURE: 125 MMHG | HEIGHT: 72 IN

## 2017-04-24 DIAGNOSIS — K59.00 CONSTIPATION, UNSPECIFIED CONSTIPATION TYPE: ICD-10-CM

## 2017-04-24 DIAGNOSIS — E83.119 HEMOCHROMATOSIS, UNSPECIFIED HEMOCHROMATOSIS TYPE: ICD-10-CM

## 2017-04-24 DIAGNOSIS — Z78.9 ALCOHOL USE: ICD-10-CM

## 2017-04-24 DIAGNOSIS — K74.60 CIRRHOSIS OF LIVER WITHOUT ASCITES, UNSPECIFIED HEPATIC CIRRHOSIS TYPE: ICD-10-CM

## 2017-04-24 DIAGNOSIS — K20.90 ESOPHAGITIS: ICD-10-CM

## 2017-04-24 DIAGNOSIS — K74.60 CIRRHOSIS OF LIVER WITHOUT ASCITES, UNSPECIFIED HEPATIC CIRRHOSIS TYPE: Primary | ICD-10-CM

## 2017-04-24 DIAGNOSIS — R18.8 OTHER ASCITES: ICD-10-CM

## 2017-04-24 DIAGNOSIS — R16.0 LIVER MASSES: ICD-10-CM

## 2017-04-24 LAB
ANION GAP SERPL CALC-SCNC: 8 MMOL/L
BASOPHILS # BLD AUTO: 0.04 K/UL
BASOPHILS NFR BLD: 1.5 %
BUN SERPL-MCNC: 11 MG/DL
CALCIUM SERPL-MCNC: 8.9 MG/DL
CHLORIDE SERPL-SCNC: 101 MMOL/L
CO2 SERPL-SCNC: 26 MMOL/L
CREAT SERPL-MCNC: 1.1 MG/DL
DIFFERENTIAL METHOD: ABNORMAL
EOSINOPHIL # BLD AUTO: 0.1 K/UL
EOSINOPHIL NFR BLD: 4.2 %
ERYTHROCYTE [DISTWIDTH] IN BLOOD BY AUTOMATED COUNT: 13.7 %
EST. GFR  (AFRICAN AMERICAN): >60 ML/MIN/1.73 M^2
EST. GFR  (NON AFRICAN AMERICAN): >60 ML/MIN/1.73 M^2
GLUCOSE SERPL-MCNC: 142 MG/DL
HCT VFR BLD AUTO: 37.2 %
HGB BLD-MCNC: 12.9 G/DL
INR PPP: 1.3
LYMPHOCYTES # BLD AUTO: 1.1 K/UL
LYMPHOCYTES NFR BLD: 41.8 %
MCH RBC QN AUTO: 35.8 PG
MCHC RBC AUTO-ENTMCNC: 34.7 %
MCV RBC AUTO: 103 FL
MONOCYTES # BLD AUTO: 0.2 K/UL
MONOCYTES NFR BLD: 9.1 %
NEUTROPHILS # BLD AUTO: 1.1 K/UL
NEUTROPHILS NFR BLD: 43.4 %
PLATELET # BLD AUTO: 103 K/UL
PMV BLD AUTO: 9.7 FL
POTASSIUM SERPL-SCNC: 3.8 MMOL/L
PROTHROMBIN TIME: 13.8 SEC
RBC # BLD AUTO: 3.6 M/UL
SODIUM SERPL-SCNC: 135 MMOL/L
WBC # BLD AUTO: 2.63 K/UL

## 2017-04-24 PROCEDURE — 99215 OFFICE O/P EST HI 40 MIN: CPT | Mod: S$PBB,,, | Performed by: NURSE PRACTITIONER

## 2017-04-24 PROCEDURE — 99999 PR PBB SHADOW E&M-EST. PATIENT-LVL IV: CPT | Mod: PBBFAC,,, | Performed by: NURSE PRACTITIONER

## 2017-04-24 PROCEDURE — 99214 OFFICE O/P EST MOD 30 MIN: CPT | Mod: PBBFAC | Performed by: NURSE PRACTITIONER

## 2017-04-24 RX ORDER — LACTULOSE 10 G/15ML
20 SOLUTION ORAL 2 TIMES DAILY
Qty: 1800 ML | Refills: 5
Start: 2017-04-24 | End: 2017-04-27 | Stop reason: SDUPTHER

## 2017-04-24 RX ORDER — OMEPRAZOLE 20 MG/1
20 CAPSULE, DELAYED RELEASE ORAL DAILY
Qty: 30 CAPSULE | Refills: 3
Start: 2017-04-24 | End: 2018-04-24

## 2017-04-24 NOTE — PROGRESS NOTES
[facility] Established Patient Visit    Reason for Visit:  F/u cirrhosis    PCP: Gilo Kawasaki    HPI:  This is a 66 y.o. 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 [facility]. His ferritin is elevated at 1696 with elevated serum iron and iron sat. 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 No copies of H63D. His ASMA was (+) at 1:40 and IgG elevated at 2348 but AMA and TUAN negative. He denied drinking any alcohol in the past year but his PETH was (+) at 180 indicating recent alcohol intake. Pt admitted to drinking a toast of champagne at a wedding in February and also to drinking non alcoholic beer after PETH came back (+). He is here with his girlfriend today. She reports he used to drink but has stopped since then. Reiterated need for continued complete abstinence of alcohol.      His MELD was elevated at 16 on initial labs. We initiated clearance for liver transplant evaluation based on this. He has been unable to start evaluation though since he does not have any prescription drug coverage. He can enroll in this in October per girlfriend.     His cirrhosis has been decompensated with ascites. He was started on lasix 20 mg and spironolactone 50 mg daily on 3/14/17. He para on 3/9 that removed 2.6L and had to go to ER on 4/20 due to abd distention and had para there that removed 6L. His diuretics have been increased and he is now on lasix 60 mg and spironolactone 200 mg daily. He reports following a low Na diet. He was started on lactulose at last visit for his constipation. He only took this once, he didn't know he needed to continue it. Still having constipation.    TPCT on 3/8 revealed 3 indeterminate liver lesions, all 1 cm or less, all hyperenhancing. One with washout on delayed phase. He had MRI 4/11/17 that showed 3 arterial hyperenhancing lesions identified within hepatic segment V best seen on comparison  CT exam with the largest measuring approximately 1.5 cm in maximal diameter.  No evidence of washout or pseudocapsule is identified.  No new enhancing lesions are identified. AFP is normal. Scans were reviewed in IR conference with recs to repeat MRI and AFP in 3 months, 7/2017.    [facility] this [facility]. He is scheduled to see a liver specialist there in July.     Denies jaundice, dark urine, hematemesis, melena, slowed mentation. He still plans on going on his cruise to [facility] in May to get . Will be leaving on 5/5/17 - 5/14/17. He wants to get a para done before he leaves for this.       ROS:   GENERAL: Denies fever, chills, weight change, (+) fatigue  HEENT: Denies headaches, dizziness, vision/hearing changes  CARDIOVASCULAR: Denies chest pain, palpitations, (+) edema  RESPIRATORY: Denies dyspnea, cough  GI: (+) abdominal pain, no rectal bleeding, no nausea, vomiting. (+) constipation  : Denies dysuria, hematuria   SKIN: Denies rash, itching   NEURO: Denies confusion, memory loss, or mood changes  PSYCH: Denies depression or anxiety  HEME/LYMPH: Denies easy bruising or bleeding      PMHX:  has a past medical history of BPH (benign prostatic hyperplasia); Cirrhosis of liver with ascites (3/8/2017); Hemochromatosis; and Neuropathy.    PSHX:  has a past surgical history that includes Hernia repair; Colonoscopy (2012); and Upper gastrointestinal endoscopy.    The patient's social and family histories were reviewed by me and updated in the appropriate section of the electronic medical record.    Review of patient's allergies indicates:   Allergen Reactions    Sulfa (sulfonamide antibiotics) Nausea And Vomiting       Medications reviewed in Epic      Objective Findings:    PHYSICAL EXAM:   Friendly White male, in no acute distress; alert and oriented to person, place and time  VITALS: /80 (BP Location:  Left arm, Patient Position: Sitting, BP Method: Automatic)  Pulse 84  Temp 98.3 °F (36.8 °C) (Oral)   Resp 20  Ht 6' (1.829 m)  Wt 71.2 kg (156 lb 15.5 oz)  SpO2 97%  BMI 21.29 kg/m2  HENT: Normocephalic, without obvious abnormality. Oral mucosa pink and moist. Dentition good.  EYES: Sclerae mildly icteric. No conjunctival pallor.   NECK: Supple. No masses or cervical adenopathy.  CARDIOVASCULAR: Regular rate and rhythm. No murmurs.  RESPIRATORY: Normal respiratory effort. BBS CTA. No wheezes or crackles.  GI: Soft, non-tender, mildly distended. (+) hepatosplenomegaly. No masses palpable. No significant ascites.  EXTREMITIES: No clubbing, cyanosis, edema.  SKIN: Warm and dry. No jaundice. (+) dark skin tone. No rashes noted to exposed skin. No telangectasias noted. (+) palmar erythema.  NEURO: Normal gate. No asterixis. (+) mild tremor  PSYCH: Memory intact. Thought and speech pattern appropriate. Behavior normal. No depression or anxiety noted.       Labs:  Lab Results   Component Value Date    WBC 3.75 (L) 04/20/2017    HGB 13.4 (L) 04/20/2017    HCT 38.2 (L) 04/20/2017    PLT 96 (L) 04/20/2017     04/20/2017    K 4.4 04/20/2017    CREATININE 0.82 04/20/2017    ALT 25 04/20/2017    AST 69 (H) 04/20/2017    ALKPHOS 100 04/20/2017    BILITOT 3.0 (H) 04/20/2017    ALBUMIN 3.9 04/20/2017    INR 1.3 (H) 04/24/2017    AFP 6.9 03/08/2017     MRI 4/11/17  Findings:    Suboptimal exam secondary to motion.    Dependent bibasilar atelectasis is noted.  The lung bases and lower cardiomediastinum are otherwise unremarkable.    The liver and pancreas are diffusely decreased in T2 signal.  There are 3 arterial hyperenhancing lesions identified within hepatic segment V best seen on comparison CT exam with the largest measuring approximately 1.5 cm in maximal diameter.  No evidence of washout or pseudocapsule is identified.  No new enhancing lesions are identified.    The liver is nodular in contour in keeping with  cirrhosis.  The spleen is within normal limits.  The bilateral adrenal glands are within normal limits.  Subcentimeter right renal cysts are noted.  The kidneys are otherwise unremarkable without evidence of obstruction.  Visualized bowel is unremarkable.  Intra-abdominal ascites is noted.    Visualized bone marrow and soft tissues are within normal limits.   Impression     1.  Cirrhosis with 3 indeterminant arterial enhancing liver lesions within hepatic segment V.  Recommend close interval followup.  2.  Findings consistent with primary hemochromatosis.           ASSESSMENT:  66 y.o. White male with:  1.  Cirrhosis, decompensated with ascites  -- MELD - MELD-Na score: 14 at 4/20/2017  5:35 AM  MELD score: 14 at 4/20/2017  5:35 AM  Calculated from:  Serum Creatinine: 0.82 mg/dL (Rounded to 1) at 4/20/2017  5:35 AM  Serum Sodium: 138 mmol/L (Rounded to 137) at 4/20/2017  5:35 AM  Total Bilirubin: 3.0 mg/dL at 4/20/2017  5:35 AM  INR(ratio): 1.4 at 4/20/2017  5:35 AM  Age: 66 years  -- HCC screening- AFP and abd. U/S - see below  -- (+) Immunity to Hep A and B per labs  -- EGD - 3/16/17 - no varices  2. Hemochromatosis  -- 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 No copies of H63D  3. Ascites  -- SAAG > 1.1 c/w portal HTN etiology on diagnostic and therapeutic para  -- s/p para on 3/9/17 with 2.6 L removed and para on 4/20/17with 6L removed. On lasix 60 mg and spironolactone 200 mg daily   4. Liver masses  -- TPCT 3/8/17 - 3 indeterminate liver lesions, all 1 cm or less, all hyperenhancing. One with washout on delayed phase. -- MRI 4/11/17 - 3 indeterminant arterial enhancing liver lesions within hepatic segment V, no washout or pseudocapsule  -- AFP nl  5. Alcohol use, PETH (+) at initial visit  -- pt denied alcohol use in last year but  "then admitted to "drinking a toast of champagne" at a wedding in 2/2017 and then drinking non alcoholic beer  -- reiterated need for complete abstinence of alcohol with his cirrhosis  -- this could be contributing to his elevated ferritin levels since he is only a heterozygote for C282Y  -- will continue to monitor with next lab  -- has been referred for liver transplant evaluation d/t elevated MELD but needs to be abstinent of alcohol in order to undergo liver transplant eval  6. Esophagitis  -- on PPI  7. Constipation  -- continue lactulose       EDUCATION:   The disease process and manifestations of cirrhosis were discussed.    Signs and symptoms of hepatic decompensation were reviewed, including jaundice, ascites, and slowed mentation due to hepatic encephalopathy. The patient should seek medical attention if any of these things occur.  We discussed the potential for bleeding from esophageal varices with symptoms of hematemesis and melena. The patient should report to the Emergency Department for these symptoms.    We discussed the increased risk of hepatocellular carcinoma due to cirrhosis. Continued screening every six months with ultrasound and AFP is recommended.     No alcohol or raw seafood.  Tylenol/acetaminophen as needed for pain, up to 2000 mg daily    Discussed use of the MELD score and its role in the management and determining the prognosis of cirrhosis.       PLAN:  1. Await today's labs  2. Repeat MRI in 7/2017  3. Continue lactulose 30 cc BID for constipation  4. Continue Omeprazole 20 mg Q AM  5. Continue lasix 60 mg and spironolactone 200 mg daily  6. [facility]   7. Avoid alcohol completely  8. Pt needs to obtain Rx drug coverage in order to be able to proceed with liver transplant evaluation. Open enrollment for Medicare is not until 10/2017 per girlfriend. Will consult social work to see if they can assist  9. Possible paracentesis before 5/5/17  10. F/u with me " in 3 months      Thank you for allowing me to participate in the care of Fernando Fungramirez Ro, NP-C     Total duration of visit = 45 min, with > 50% spent counseling

## 2017-04-24 NOTE — MR AVS SNAPSHOT
[facility] - Hepatology  [address]  [facility] [address]  Phone: [phone]  Fax: [phone]                  Fernando Garg   2017 2:20 PM   Office Visit    Description:  Male : 1950   Provider:  Nasima Ro NP   Department:  [facility]           Reason for Visit     Cirrhosis           Diagnoses this Visit        Comments    Cirrhosis of liver without ascites, unspecified hepatic cirrhosis type    -  Primary            To Do List           Future Appointments        Provider Department Dept Phone    2017  9:00 AM [facility] [id number] [facility] [phone]      Goals (5 Years of Data)     None      [facility]     [facility] Care [facility] -  Assistance  Unless otherwise directed by your provider, please contact [facility], our nurse care line that is available for  assistance.     Registered nurses in the [facility] provide: appointment scheduling, clinical advisement, health education, and other advisory services.  Call: [phone] (toll free)               Medications           Message regarding Medications     Verify the changes and/or additions to your medication regime listed below are the same as discussed with your clinician today.  If any of these changes or additions are incorrect, please notify your healthcare provider.        STOP taking these medications     lactulose (CHRONULAC) 20 gram/30 mL Soln Take 30 mLs (20 g total) by mouth 2 (two) times daily. Goal of 3-4 BM's daily    omeprazole (PRILOSEC) 20 MG capsule Take 1 capsule (20 mg total) by mouth once daily.    tamsulosin (FLOMAX) 0.4 mg Cp24 Take 0.4 mg by mouth once daily.           Verify that the below list of medications is an accurate representation of the medications you are currently taking.  If none reported, the list may be blank. If incorrect, please contact your healthcare provider. Carry this list with you in case of  emergency.           Current Medications     clonazePAM (KLONOPIN) 0.5 MG tablet Take 0.5 mg by mouth 2 (two) times daily as needed for Anxiety.    furosemide (LASIX) 40 MG tablet Take 1.5 tablets (60 mg total) by mouth once daily.    gabapentin (NEURONTIN) 300 MG capsule Take 300 mg by mouth 3 (three) times daily.    spironolactone (ALDACTONE) 100 MG tablet Take 2 tablets (200 mg total) by mouth once daily.    tramadol (ULTRAM) 50 mg tablet Take 50 mg by mouth every 6 (six) hours as needed for Pain.    Lactobacillus rhamnosus GG (CULTURELLE) 10 billion cell capsule Take 1 capsule by mouth once daily.           Clinical Reference Information           Your Vitals Were     BP Pulse Temp Resp Height Weight    125/80 (BP Location: Left arm, Patient Position: Sitting, BP Method: Automatic) 84 98.3 °F (36.8 °C) (Oral) 20 6' (1.829 m) 71.2 kg (156 lb 15.5 oz)    SpO2 BMI             97% 21.29 kg/m2         Blood Pressure          Most Recent Value    BP  125/80      Allergies as of 4/24/2017     Sulfa (Sulfonamide Antibiotics)      Immunizations Administered on Date of Encounter - 4/24/2017     None      Orders Placed During Today's Visit     Future Labs/Procedures Expected by Expires    Phosphatidylethanol (PETH)  4/24/2017 6/23/2018    AFP tumor marker  As directed 4/24/2018    CBC auto differential  As directed 4/24/2018    CBC auto differential  As directed 4/24/2018    Comprehensive metabolic panel  As directed 4/24/2018    Comprehensive metabolic panel  As directed 4/24/2018    Ferritin  As directed 4/24/2018    Protime-INR  As directed 4/24/2018    Protime-INR  As directed 4/24/2018      Maintenance Dialysis History     Patient has no recorded history of maintenance dialysis.      [facility] Sign-Up     Activating your [facility] account is as easy as 1-2-3!     1) Visit [email or web address], select Sign Up Now, enter this activation code and your date of birth, then select Next.  [email or web address]  Expires: 6/8/2017  3:11  PM      2) Create a username and password to use when you visit [facility] in the future and select a security question in case you lose your password and select Next.    3) Enter your e-mail address and click Sign Up!    Additional Information  If you have questions, please e-mail [email or web address] or call [phone] to talk to our [facility] staff. Remember, [facility] is NOT to be used for urgent needs. For medical emergencies, dial 911.         Instructions    1. Will do para if needed before trip  2. Low salt diet, < 2000 mg daily  3. Continue same doses of fluid pills  4. Lactulose daily  5. [facility]  6. Repeat MRI in 7/2017  7. F/u in 7/2017 one week after MRI      Cirrhosis    The liver is found on the right side of your abdomen, just below the rib cage. The liver has many essential functions. Among these, it filters toxins from the blood. It also helps blood clot to stop bleeding. Cirrhosis results from scarring and injury to the liver. This damage is permanent. It can lead to loss of liver function. At some point, the liver may stop working (liver failure).   Long-term heavy alcohol use and having hepatitis B or C are the two most common causes of cirrhosis. Other things that can damage the liver include toxins, certain medicines, and certain viruses.  Common symptoms of cirrhosis include:  · Tiredness, weakness  · Loss of appetite  · Nausea and vomiting  · Easy bleeding and bruising  · Abdominal swelling  · Weight loss  · Jaundice  · Itching  · Confusion  Treatment is aimed at managing symptoms and preventing further liver damage. Treatments may be given to fight the hepatitis virus. Quitting alcohol will help slow the progress of the disease and may prevent further complications. If cirrhosis progresses and becomes life threatening, a liver transplant may be an option in some cases.   Home care  · Avoid medicines that can worsen liver damage.  Your healthcare provider will explain  if any of the medicines you now take need to be changed. Talk to you healthcare provider before taking any medicine, including mineral and vitamin supplements or herbs. Certain substances can worsen liver damage.  · Talk to your healthcare provider avoiding medicines containing acetaminophen or NSAIDs (such as ibuprofen and naproxen). These can affect your liver.   · Stop drinking alcohol. If you are dependent on alcohol or find it hard to stop drinking, seek professional help. Consider joining Alcoholics Anonymous or another type of treatment program for support.  · If you use IV drugs, you are at high risk for hepatitis B and C. Seek help to stop.   Follow-up care  Follow up with your healthcare provider or as advised by our staff.  For more information and to learn about support groups for people with liver disease, contact:  · American Liver Foundation  www.liverfoundation.org  864.355.6174  · Hepatitis Foundation International  www.hepfi.org  798- 123-7967  When to seek medical advice  Call your healthcare provider for any of the following:  · Rapid weight gain with increased size of your abdomen or leg swelling  · Increasing jaundice (yellow color of skin or eyes)  · Excess bleeding from cuts or injuries  Date Last Reviewed: 6/22/2015  © 3424-2721 [employer]. [address] [place] [address]. All rights reserved. This information is not intended as a substitute for professional medical care. Always follow your healthcare professional's instructions.        Treating Cirrhosis  Cirrhosis is a condition where the liver is damaged. Scar tissue slowly replaces healthy tissue. Treatment can control or slow liver scarring. Follow your healthcare providers instructions closely to get the most out of your treatment. And ask your family and friends for support.  Making a treatment plan  You and your healthcare provider will decide on a treatment plan thats best for you. The plan may include  one or more of the following:  · Avoiding alcohol. Heavy alcohol use can damage the liver. Once the liver is damaged, even a small amount of alcohol can cause problems. You can slow down the progression of cirrhosis if you stop all alcohol use.   · Medicines. These may be given to treat some causes of cirrhosis, such as infection or a bile duct blockage. If needed, medicine may be used to improve blood clotting. And medicine can be given if your immune system is attacking the liver or bile ducts.  · Other medicines should be avoided in cirrhosis. These are NSAIDs, such as ibuprofen, naproxen, and similar. They can hurt the kidney in cirrhosis.   · Treating symptoms. Cirrhosis can cause swelling in the stomach and legs. A low-salt diet can help relieve this symptom. So can taking water pills (diuretics).  · Eating healthy foods  · Losing excess weight. If you have metabolic problems like being overweight, diabetes, high blood pressure, or high cholesterol and triglycerides, if you improve those diseases, you can also slow down the progression of cirrhosis. Exercise is very important.   · Removal of iron from the blood to decrease iron levels in liver tissue if these levels are high.  Severe cases of cirrhosis may need special treatments. Your healthcare provider can discuss them with you.  Avoiding alcohol  Alcohol use can destroy liver cells. If you have problems quitting alcohol, get the support you need. Your healthcare provider may be able to suggest local groups that can help you stop drinking alcohol.   Date Last Reviewed: 6/1/2016  © 6083-4219 [employer]. [address] [place] [address], [place] [address]. All rights reserved. This information is not intended as a substitute for professional medical care. Always follow your healthcare professional's instructions.        Understanding Cirrhosis    Cirrhosis is a chronic (lifelong) liver problem. It results from damaged and scarred liver tissue.  Cirrhosis cant be cured. But it can be treated. Your healthcare provider can tell you more.  The liver  The liver is a large organ in the upper right part of the belly. A healthy liver metabolizes proteins, carbohydrates, and fats. It makes a digestive fluid called bile and removes toxins from the blood. The liver is also involved in the blood-clotting process.  When you have cirrhosis  When you have cirrhosis, your liver becomes damaged and scarred. The liver doesnt function as it should. In some cases, cirrhosis can lead to liver failure. If it does, your healthcare provider will tell you whether you may need a liver transplant. You can slow down the progression of cirrhosis if you stop all alcohol use. Also, if you have metabolic problems like being overweight, diabetes, high blood pressure, or high cholesterol and triglycerides, you can also slow down the progression of cirrhosis by trying to improve those diseases.   Causes of cirrhosis  Cirrhosis causes include the following:  · Alcohol use  · Viral liver infections, such as hepatitis  · Chronic bile duct blockage  · Certain inherited diseases that can result in too much copper or iron being stored in the liver  · Certain medicines  · Nonalcoholic fatty liver disease  · Autoimmune disease  Common signs and symptoms  Typical cirrhosis signs and symptoms include the following:  · Fatigue, weakness, and lack of appetite  · Vomiting with or without blood  · Weight loss or weight gain  · Yellowish skin and eyes (jaundice)  · Itching  · Swollen belly and legs  · Intestinal bleeding  · Easy bruising of the skin  · Dilated veins in the esophagus and stomach  · Poor mental function  Date Last Reviewed: 6/1/2016 © 2000-2016 The [employer]. [address] [place] [address], [place] [address]. All rights reserved. This information is not intended as a substitute for professional medical care. Always follow your healthcare professional's  instructions.        Ascites    Ascites is fluid collecting in the abdomen (stomach area). Symptoms include swelling of the abdomen and a feeling of pressure. Shortness of breath may also occur. In severe cases, the feet, ankles and legs may also swell.   There are many causes of ascites. The most common are related to the liver. They include:  · Long-term alcohol abuse  · Hepatitis  · Diseases such as congestive heart failure, kidney failure, pancreatitis, or cancer  To treat the condition, a low-salt diet may be recommended. Medicines that help fluid leave the body (diuretics) may be prescribed. In some cases, a procedure is done to drain the abdomen of fluid. This is called paracentesis. Unless the underlying cause is treated, the fluid is likely to return.  If liver damage is due to alcohol, stopping all alcohol will help slow the progress of the disease. If liver damage is from hepatitis B or C, treatments may be given to fight the virus. If liver damage becomes life threatening, a liver transplant may be needed.  Home care  · Certain medicines can worsen liver damage. Talk to your healthcare provider or pharmacist about any medicines you currently take. Ask your healthcare provider or pharmacist before taking any new medicines. Also ask before taking herbs, vitamins, or minerals. Certain ones affect the liver.  · Do not taking acetaminophen or ibuprofen without taking to your healthcare provider first. Both can affect your liver.   · Stop all alcohol use. If you abuse alcohol, talk to your healthcare provider about getting help and support to stop.   · If you use IV drugs, seek help to stop. Never share needles or other equipment.    Follow-up care  Follow up with your healthcare provider as advised. If a culture was done, call as directed for the results. Depending on the results, your treatment may change.  The following sources can tell you more about ascites and help you find support.  · American Liver  Foundation 882-309-0780 www.liverfoundation.org  · Hepatitis Foundation International www.hepfi.org  · Alcoholics Anonymous www.aa.org  · National [place] on Alcoholism and Drug Dependence 978-256-5577 www.ncadd.org  When to seek medical advice  Call your healthcare provider right away if you have any of the following:  · Sudden weight gain with increased size of your abdomen or leg swelling  · Increasing jaundice (yellowing of skin or eyes)  · Excess bleeding from cuts or injuries  · Blood in vomit or stool (black or red color)  · Trouble breathing  · Increasing abdominal pain  · Fever of 100.4ºF (38ºC) or higher, or as directed by your healthcare provider  Date Last Reviewed: 6/16/2015 © 2000-2016 [employer]. [address] [place] [address], [place] [address]. All rights reserved. This information is not intended as a substitute for professional medical care. Always follow your healthcare professional's instructions.        Discharge Instructions for Hereditary Hemochromatosis  You have been diagnosed with hereditary hemochromatosis (HH). This is an inherited disease that causes you to soak up too much iron. Iron is needed for making red blood cells. But too much of it can cause serious health problems. Here's what you need to know.  Home care  · Tell your children and your brothers and sisters that you have hemochromatosis. The disease is inherited, so other family members may have it and not know it. Your first degree family members should talk to their health care provider about the need for blood testing.  · Have your iron levels checked regularly.  · Avoid drinking alcohol. If you have trouble quitting, ask your health care provider about programs to help you.  · Avoid eating large quantities of iron-rich foods, such as red meats (especially liver) and food products with iron added.  · Dont eat raw fish or raw shellfish.  · Never take iron supplements. Even small amounts of iron in some multivitamins  can be harmful.  · Dont take pills with more than 500 mg of vitamin C each day. Its OK to eat foods that have vitamin C.  Follow-up  · Make a follow-up appointment.  · Keep your follow-up appointments. You may need to have a pint of blood removed (phlebotomy) on a regular basis to keep your iron levels normal.     When to call your healthcare provider  Call your healthcare provider right away if you have any of the following:  · Tiredness  · Irregular pulse or heartbeat; any chest pain  · Loss of appetite, nausea, or vomiting  · Difficulty breathing or exercising  · Increased thirst or increased need to urinate  · Fever of 100.4°F (38°C) or higher, or as directed by your healthcare provider  · Muscle aches, joint pains, or pain in your belly  · Darkened skin for no apparent reason  · Yellowing of the skin or whites of the eyes (jaundice)   Date Last Reviewed: 4/29/2015  © 3474-4256 [employer]. [address] [place] [address]. All rights reserved. This information is not intended as a substitute for professional medical care. Always follow your healthcare professional's instructions.        Paracentesis  Your healthcare provider recommends that you have paracentesis. This is a procedure to remove extra fluid from your belly (abdomen). A needle is used to drain the fluid. A small sample of fluid may be taken and tested for problems. If the fluid buildup is causing discomfort or pain, all of the fluid may be drained. To do this, a tube is attached to the needle. The fluid is drained into a container that sits outside of the body. If symptoms are severe, paracentesis may be done as an emergency procedure. Otherwise, it will be scheduled ahead of time. Read on to learn more about paracentesis and how it works.     Understanding ascites  Many of the bodys organs, including the liver and intestines, are inside the belly (abdomen). The organs are covered in a thin membrane called  the peritoneum. The peritoneum has 2 layers. It makes a fluid that allows the layers to glide smoothly past each other. If this fluid builds up in the belly, the condition is called ascites. Ascites causes pain and discomfort. It can also make it hard to breathe. Fluid can build up for a number of reasons. These include chronic liver disease (cirrhosis), heart or kidney failure, and cancer. Your provider can tell you more about the cause of your ascites.  How paracentesis works  The goal of paracentesis may be to help diagnose the cause of the excess fluid. Or, the goal may be to drain excess fluid from the abdomen. In some cases, fluid returns and the procedure needs to be repeated.  Before the procedure  · Tell your provider about any medicines you are taking. This includes all prescription medicines, over-the-counter medicines, street drugs, herbs, vitamins, and other supplements.  · Tell your provider about any allergies you have.  · Before the procedure begins, youll be asked to empty your bladder. This helps prevent injury to the bladder during the procedure. If needed, a thin tube (Cid catheter) may be placed into your bladder to drain urine during the procedure. This tube is removed after the procedure.  · An IV (intravenous) line may be put into a vein in your arm or hand. This line supplies fluids and medicines.  During the procedure  · You are awake during the procedure.  · An imaging method called ultrasound may be used to guide the procedure. It shows live images of the inside of your belly on a video screen. This helps the provider find the site of the excess fluid inside your belly and decide where to insert the needle.  · A numbing medicine (local anesthesia) is injected into your belly where the needle will be inserted.  · Once the skin is numb, the provider carefully inserts the needle into the belly. This causes the needle to fill with fluid.  · The needle may be removed with only a small  sample of fluid. This sample is sent to a lab for testing. Getting a sample takes about 10 to 15 minutes.  · Or, a tube may be attached to the needle so that more of the excess fluid can be drained. The tube may be taped or stitched into place. This keeps it from pulling the needle out of your belly.  · How long it takes to drain all of the fluid varies for each person. In most cases, it takes about 30 minutes. Your provider will let you know if the procedure is expected to take longer than usual.  · Once all of the fluid is drained, the needle and tube are removed.  · Pressure is put on the puncture site to stop any fluid leakage or bleeding.  · A small bandage is placed over the puncture site. Albumin may be given during or after the procedure to prevent low blood pressure or kidney problems.  After the procedure  You may be taken to a recovery room to rest after the procedure. If you are in pain, you will be given medicine as needed. You will likely be sent home 1 to 2 hours after the procedure is done. When you leave the hospital, have an adult family member or friend drive you home. If you are staying in the hospital, you will return to your hospital room.  Risks and possible complications of paracentesis  This procedure is considered safe. But like all procedures, it carries some risks. These include the following:  · Bleeding  · Infection  · Injury to structures in the belly  · Fast drop in blood pressure   Recovering at home  · If needed, your provider can prescribe or recommend pain medicines for you to take at home. Take these exactly as directed. If you stopped taking other medicines before the procedure, ask your provider when you can start them again.  · You may remove the bandage 24 hours after the procedure.  · Take it easy for 24 hours after the procedure. Avoid physical activity until your provider says its OK.  Follow-up care  Make a follow-up appointment with your provider as directed. During  your follow-up visit, your provider will check your healing. Let your provider know how you are feeling. You can also discuss the cause of your ascites and if any more treatment is needed.   When to seek medical care  Call your healthcare provider if you notice any of the following after the procedure:  · A fever of 100.4°F (38.0°C) or higher  · Trouble breathing  · Pain that does not go away even after taking pain medicine  · Belly pain not caused by having the skin punctured  · Bleeding from the puncture site  · More than a small amount of fluid leakage from the puncture site  · Swollen belly  · Signs of infection at the puncture site. These include increased pain, redness, or swelling, as well as warmth or bad-smelling drainage.  · Blood in your urine  · Dizziness, lightheadedness, or fainting   Date Last Reviewed: 7/1/2016  © 8255-3187 [employer]. [address] [place] [address]. All rights reserved. This information is not intended as a substitute for professional medical care. Always follow your healthcare professional's instructions.             Language Assistance Services     ATTENTION: Language assistance services are available, free of charge. Please call [phone].      ATENCIÓN: Si habla español, tiene a moreno disposición servicios gratuitos de asistencia lingüística. Llame al [phone].     LESLEY Ý: N?u b?n nói Ti?ng Vi?t, có các d?ch v? h? tr? ngôn ng? mi?n phí dành cho b?n. G?i s? [phone].         [facility] complies with applicable Federal civil rights laws and does not discriminate on the basis of race, color, national origin, age, disability, or sex.

## 2017-04-24 NOTE — PATIENT INSTRUCTIONS
1. Will do para if needed before trip  2. Low salt diet, < 2000 mg daily  3. Continue same doses of fluid pills  4. Lactulose daily  5. [facility]  6. Repeat MRI in 7/2017  7. F/u in 7/2017 one week after MRI      Cirrhosis    The liver is found on the right side of your abdomen, just below the rib cage. The liver has many essential functions. Among these, it filters toxins from the blood. It also helps blood clot to stop bleeding. Cirrhosis results from scarring and injury to the liver. This damage is permanent. It can lead to loss of liver function. At some point, the liver may stop working (liver failure).   Long-term heavy alcohol use and having hepatitis B or C are the two most common causes of cirrhosis. Other things that can damage the liver include toxins, certain medicines, and certain viruses.  Common symptoms of cirrhosis include:  · Tiredness, weakness  · Loss of appetite  · Nausea and vomiting  · Easy bleeding and bruising  · Abdominal swelling  · Weight loss  · Jaundice  · Itching  · Confusion  Treatment is aimed at managing symptoms and preventing further liver damage. Treatments may be given to fight the hepatitis virus. Quitting alcohol will help slow the progress of the disease and may prevent further complications. If cirrhosis progresses and becomes life threatening, a liver transplant may be an option in some cases.   Home care  · Avoid medicines that can worsen liver damage.  Your healthcare provider will explain if any of the medicines you now take need to be changed. Talk to you healthcare provider before taking any medicine, including mineral and vitamin supplements or herbs. Certain substances can worsen liver damage.  · Talk to your healthcare provider avoiding medicines containing acetaminophen or NSAIDs (such as ibuprofen and naproxen). These can affect your liver.   · Stop drinking alcohol. If you are dependent on alcohol or find it hard to stop drinking, seek  professional help. Consider joining Alcoholics Anonymous or another type of treatment program for support.  · If you use IV drugs, you are at high risk for hepatitis B and C. Seek help to stop.   Follow-up care  Follow up with your healthcare provider or as advised by our staff.  For more information and to learn about support groups for people with liver disease, contact:  · American Liver Foundation  www.liverfoundation.org  323.255.6173  · Hepatitis [employer] International  www.hepfi.org  865- 832-1544  When to seek medical advice  Call your healthcare provider for any of the following:  · Rapid weight gain with increased size of your abdomen or leg swelling  · Increasing jaundice (yellow color of skin or eyes)  · Excess bleeding from cuts or injuries  Date Last Reviewed: 6/22/2015 © 2000-2016 [employer]. [address] [place] [address]. All rights reserved. This information is not intended as a substitute for professional medical care. Always follow your healthcare professional's instructions.        Treating Cirrhosis  Cirrhosis is a condition where the liver is damaged. Scar tissue slowly replaces healthy tissue. Treatment can control or slow liver scarring. Follow your healthcare providers instructions closely to get the most out of your treatment. And ask your family and friends for support.  Making a treatment plan  You and your healthcare provider will decide on a treatment plan thats best for you. The plan may include one or more of the following:  · Avoiding alcohol. Heavy alcohol use can damage the liver. Once the liver is damaged, even a small amount of alcohol can cause problems. You can slow down the progression of cirrhosis if you stop all alcohol use.   · Medicines. These may be given to treat some causes of cirrhosis, such as infection or a bile duct blockage. If needed, medicine may be used to improve blood clotting. And medicine can be given if your immune system is  attacking the liver or bile ducts.  · Other medicines should be avoided in cirrhosis. These are NSAIDs, such as ibuprofen, naproxen, and similar. They can hurt the kidney in cirrhosis.   · Treating symptoms. Cirrhosis can cause swelling in the stomach and legs. A low-salt diet can help relieve this symptom. So can taking water pills (diuretics).  · Eating healthy foods  · Losing excess weight. If you have metabolic problems like being overweight, diabetes, high blood pressure, or high cholesterol and triglycerides, if you improve those diseases, you can also slow down the progression of cirrhosis. Exercise is very important.   · Removal of iron from the blood to decrease iron levels in liver tissue if these levels are high.  Severe cases of cirrhosis may need special treatments. Your healthcare provider can discuss them with you.  Avoiding alcohol  Alcohol use can destroy liver cells. If you have problems quitting alcohol, get the support you need. Your healthcare provider may be able to suggest local groups that can help you stop drinking alcohol.   Date Last Reviewed: 6/1/2016 © 2000-2016 [employer]. [address] [place] [address]. All rights reserved. This information is not intended as a substitute for professional medical care. Always follow your healthcare professional's instructions.        Understanding Cirrhosis    Cirrhosis is a chronic (lifelong) liver problem. It results from damaged and scarred liver tissue. Cirrhosis cant be cured. But it can be treated. Your healthcare provider can tell you more.  The liver  The liver is a large organ in the upper right part of the belly. A healthy liver metabolizes proteins, carbohydrates, and fats. It makes a digestive fluid called bile and removes toxins from the blood. The liver is also involved in the blood-clotting process.  When you have cirrhosis  When you have cirrhosis, your liver becomes damaged and scarred. The liver doesnt  function as it should. In some cases, cirrhosis can lead to liver failure. If it does, your healthcare provider will tell you whether you may need a liver transplant. You can slow down the progression of cirrhosis if you stop all alcohol use. Also, if you have metabolic problems like being overweight, diabetes, high blood pressure, or high cholesterol and triglycerides, you can also slow down the progression of cirrhosis by trying to improve those diseases.   Causes of cirrhosis  Cirrhosis causes include the following:  · Alcohol use  · Viral liver infections, such as hepatitis  · Chronic bile duct blockage  · Certain inherited diseases that can result in too much copper or iron being stored in the liver  · Certain medicines  · Nonalcoholic fatty liver disease  · Autoimmune disease  Common signs and symptoms  Typical cirrhosis signs and symptoms include the following:  · Fatigue, weakness, and lack of appetite  · Vomiting with or without blood  · Weight loss or weight gain  · Yellowish skin and eyes (jaundice)  · Itching  · Swollen belly and legs  · Intestinal bleeding  · Easy bruising of the skin  · Dilated veins in the esophagus and stomach  · Poor mental function  Date Last Reviewed: 6/1/2016 © 2000-2016 [employer]. [address] [place] [address]. All rights reserved. This information is not intended as a substitute for professional medical care. Always follow your healthcare professional's instructions.        Ascites    Ascites is fluid collecting in the abdomen (stomach area). Symptoms include swelling of the abdomen and a feeling of pressure. Shortness of breath may also occur. In severe cases, the feet, ankles and legs may also swell.   There are many causes of ascites. The most common are related to the liver. They include:  · Long-term alcohol abuse  · Hepatitis  · Diseases such as congestive heart failure, kidney failure, pancreatitis, or cancer  To treat the condition, a  low-salt diet may be recommended. Medicines that help fluid leave the body (diuretics) may be prescribed. In some cases, a procedure is done to drain the abdomen of fluid. This is called paracentesis. Unless the underlying cause is treated, the fluid is likely to return.  If liver damage is due to alcohol, stopping all alcohol will help slow the progress of the disease. If liver damage is from hepatitis B or C, treatments may be given to fight the virus. If liver damage becomes life threatening, a liver transplant may be needed.  Home care  · Certain medicines can worsen liver damage. Talk to your healthcare provider or pharmacist about any medicines you currently take. Ask your healthcare provider or pharmacist before taking any new medicines. Also ask before taking herbs, vitamins, or minerals. Certain ones affect the liver.  · Do not taking acetaminophen or ibuprofen without taking to your healthcare provider first. Both can affect your liver.   · Stop all alcohol use. If you abuse alcohol, talk to your healthcare provider about getting help and support to stop.   · If you use IV drugs, seek help to stop. Never share needles or other equipment.    Follow-up care  Follow up with your healthcare provider as advised. If a culture was done, call as directed for the results. Depending on the results, your treatment may change.  The following sources can tell you more about ascites and help you find support.  · American Liver Foundation 897-804-7502 www.liverfoundation.org  · Hepatitis Foundation International www.hepfi.org  · Alcoholics Anonymous www.aa.org  · National [place] on Alcoholism and Drug Dependence 259-099-1100 www.ncadd.org  When to seek medical advice  Call your healthcare provider right away if you have any of the following:  · Sudden weight gain with increased size of your abdomen or leg swelling  · Increasing jaundice (yellowing of skin or eyes)  · Excess bleeding from cuts or injuries  · Blood in  vomit or stool (black or red color)  · Trouble breathing  · Increasing abdominal pain  · Fever of 100.4ºF (38ºC) or higher, or as directed by your healthcare provider  Date Last Reviewed: 6/16/2015 © 2000-2016 [employer]. [address] [place] [address], [place] [address]. All rights reserved. This information is not intended as a substitute for professional medical care. Always follow your healthcare professional's instructions.        Discharge Instructions for Hereditary Hemochromatosis  You have been diagnosed with hereditary hemochromatosis (HH). This is an inherited disease that causes you to soak up too much iron. Iron is needed for making red blood cells. But too much of it can cause serious health problems. Here's what you need to know.  Home care  · Tell your children and your brothers and sisters that you have hemochromatosis. The disease is inherited, so other family members may have it and not know it. Your first degree family members should talk to their health care provider about the need for blood testing.  · Have your iron levels checked regularly.  · Avoid drinking alcohol. If you have trouble quitting, ask your health care provider about programs to help you.  · Avoid eating large quantities of iron-rich foods, such as red meats (especially liver) and food products with iron added.  · Dont eat raw fish or raw shellfish.  · Never take iron supplements. Even small amounts of iron in some multivitamins can be harmful.  · Dont take pills with more than 500 mg of vitamin C each day. Its OK to eat foods that have vitamin C.  Follow-up  · Make a follow-up appointment.  · Keep your follow-up appointments. You may need to have a pint of blood removed (phlebotomy) on a regular basis to keep your iron levels normal.     When to call your healthcare provider  Call your healthcare provider right away if you have any of the following:  · Tiredness  · Irregular pulse or heartbeat; any chest  pain  · Loss of appetite, nausea, or vomiting  · Difficulty breathing or exercising  · Increased thirst or increased need to urinate  · Fever of 100.4°F (38°C) or higher, or as directed by your healthcare provider  · Muscle aches, joint pains, or pain in your belly  · Darkened skin for no apparent reason  · Yellowing of the skin or whites of the eyes (jaundice)   Date Last Reviewed: 4/29/2015  © 1212-4869 [employer]. [address] [place] [address]. All rights reserved. This information is not intended as a substitute for professional medical care. Always follow your healthcare professional's instructions.        Paracentesis  Your healthcare provider recommends that you have paracentesis. This is a procedure to remove extra fluid from your belly (abdomen). A needle is used to drain the fluid. A small sample of fluid may be taken and tested for problems. If the fluid buildup is causing discomfort or pain, all of the fluid may be drained. To do this, a tube is attached to the needle. The fluid is drained into a container that sits outside of the body. If symptoms are severe, paracentesis may be done as an emergency procedure. Otherwise, it will be scheduled ahead of time. Read on to learn more about paracentesis and how it works.     Understanding ascites  Many of the bodys organs, including the liver and intestines, are inside the belly (abdomen). The organs are covered in a thin membrane called the peritoneum. The peritoneum has 2 layers. It makes a fluid that allows the layers to glide smoothly past each other. If this fluid builds up in the belly, the condition is called ascites. Ascites causes pain and discomfort. It can also make it hard to breathe. Fluid can build up for a number of reasons. These include chronic liver disease (cirrhosis), heart or kidney failure, and cancer. Your provider can tell you more about the cause of your ascites.  How paracentesis works  The goal of  paracentesis may be to help diagnose the cause of the excess fluid. Or, the goal may be to drain excess fluid from the abdomen. In some cases, fluid returns and the procedure needs to be repeated.  Before the procedure  · Tell your provider about any medicines you are taking. This includes all prescription medicines, over-the-counter medicines, street drugs, herbs, vitamins, and other supplements.  · Tell your provider about any allergies you have.  · Before the procedure begins, youll be asked to empty your bladder. This helps prevent injury to the bladder during the procedure. If needed, a thin tube (Cid catheter) may be placed into your bladder to drain urine during the procedure. This tube is removed after the procedure.  · An IV (intravenous) line may be put into a vein in your arm or hand. This line supplies fluids and medicines.  During the procedure  · You are awake during the procedure.  · An imaging method called ultrasound may be used to guide the procedure. It shows live images of the inside of your belly on a video screen. This helps the provider find the site of the excess fluid inside your belly and decide where to insert the needle.  · A numbing medicine (local anesthesia) is injected into your belly where the needle will be inserted.  · Once the skin is numb, the provider carefully inserts the needle into the belly. This causes the needle to fill with fluid.  · The needle may be removed with only a small sample of fluid. This sample is sent to a lab for testing. Getting a sample takes about 10 to 15 minutes.  · Or, a tube may be attached to the needle so that more of the excess fluid can be drained. The tube may be taped or stitched into place. This keeps it from pulling the needle out of your belly.  · How long it takes to drain all of the fluid varies for each person. In most cases, it takes about 30 minutes. Your provider will let you know if the procedure is expected to take longer than  usual.  · Once all of the fluid is drained, the needle and tube are removed.  · Pressure is put on the puncture site to stop any fluid leakage or bleeding.  · A small bandage is placed over the puncture site. Albumin may be given during or after the procedure to prevent low blood pressure or kidney problems.  After the procedure  You may be taken to a recovery room to rest after the procedure. If you are in pain, you will be given medicine as needed. You will likely be sent home 1 to 2 hours after the procedure is done. When you leave the hospital, have an adult family member or friend drive you home. If you are staying in the hospital, you will return to your hospital room.  Risks and possible complications of paracentesis  This procedure is considered safe. But like all procedures, it carries some risks. These include the following:  · Bleeding  · Infection  · Injury to structures in the belly  · Fast drop in blood pressure   Recovering at home  · If needed, your provider can prescribe or recommend pain medicines for you to take at home. Take these exactly as directed. If you stopped taking other medicines before the procedure, ask your provider when you can start them again.  · You may remove the bandage 24 hours after the procedure.  · Take it easy for 24 hours after the procedure. Avoid physical activity until your provider says its OK.  Follow-up care  Make a follow-up appointment with your provider as directed. During your follow-up visit, your provider will check your healing. Let your provider know how you are feeling. You can also discuss the cause of your ascites and if any more treatment is needed.   When to seek medical care  Call your healthcare provider if you notice any of the following after the procedure:  · A fever of 100.4°F (38.0°C) or higher  · Trouble breathing  · Pain that does not go away even after taking pain medicine  · Belly pain not caused by having the skin punctured  · Bleeding from  the puncture site  · More than a small amount of fluid leakage from the puncture site  · Swollen belly  · Signs of infection at the puncture site. These include increased pain, redness, or swelling, as well as warmth or bad-smelling drainage.  · Blood in your urine  · Dizziness, lightheadedness, or fainting   Date Last Reviewed: 7/1/2016 © 2000-2016 [employer]. [address] [place] [address], [place] [address]. All rights reserved. This information is not intended as a substitute for professional medical care. Always follow your healthcare professional's instructions.

## 2017-04-25 LAB
ALBUMIN SERPL BCP-MCNC: 3 G/DL
ALP SERPL-CCNC: 88 U/L
ALT SERPL W/O P-5'-P-CCNC: 17 U/L
AST SERPL-CCNC: 39 U/L
BILIRUB DIRECT SERPL-MCNC: 0.9 MG/DL
BILIRUB SERPL-MCNC: 2.1 MG/DL
FERRITIN SERPL-MCNC: 927 NG/ML
PROT SERPL-MCNC: 7.4 G/DL

## 2017-04-26 ENCOUNTER — TELEPHONE (OUTPATIENT)
Dept: HEPATOLOGY | Facility: CLINIC | Age: 67
End: 2017-04-26

## 2017-04-26 NOTE — TELEPHONE ENCOUNTER
Please call pt to notify him ferritin now down to [phone]. Was likely more elevated at initial visit due to his alcohol use. Rest of labs stable. Continue same doses of fluid pills. MELD was 15 yesterday. Pt to call me if he needs para before he leaves for his vacation.    Pt needs MRI and f/u appt with me in 7/2017. This was not scheduled at his f/u visit for some reason. Has recall in but my schedule is open for this. Please book appts. Can do MRI in [place] and f/u appt with me one week after MRI done.

## 2017-04-27 ENCOUNTER — TELEPHONE (OUTPATIENT)
Dept: HEPATOLOGY | Facility: CLINIC | Age: 67
End: 2017-04-27

## 2017-04-27 DIAGNOSIS — K74.60 CIRRHOSIS OF LIVER WITHOUT ASCITES, UNSPECIFIED HEPATIC CIRRHOSIS TYPE: ICD-10-CM

## 2017-04-27 DIAGNOSIS — R18.8 OTHER ASCITES: Primary | ICD-10-CM

## 2017-04-27 DIAGNOSIS — K59.00 CONSTIPATION, UNSPECIFIED CONSTIPATION TYPE: ICD-10-CM

## 2017-04-27 RX ORDER — LACTULOSE 10 G/15ML
20 SOLUTION ORAL 2 TIMES DAILY
Qty: 1800 ML | Refills: 5 | Status: SHIPPED | OUTPATIENT
Start: 2017-04-27 | End: 2018-04-27

## 2017-04-27 NOTE — TELEPHONE ENCOUNTER
Pt called and left VM that he needs refill of lactulose and would like a para before 5/3 if possible either here on on [facility]. Will coordinate. Rx sent.     Please call pt to schedule paracentesis here or on NS and tell pt I sent refill of lactulose to his pharmacy.

## 2017-04-27 NOTE — TELEPHONE ENCOUNTER
MA attempted to call patient, he is unable to reached left him Vm to please give us a callback. SANDRA

## 2017-04-28 NOTE — TELEPHONE ENCOUNTER
MA called patient to inform him that he is scheduled for PARA on Monday 5/1/17 at 10:00 am. Patient confirmed the appt. SANDRA

## 2017-05-01 ENCOUNTER — HOSPITAL ENCOUNTER (OUTPATIENT)
Dept: INTERVENTIONAL RADIOLOGY/VASCULAR | Facility: HOSPITAL | Age: 67
Discharge: HOME | End: 2017-05-01
Attending: NURSE PRACTITIONER
Payer: MEDICARE

## 2017-05-01 VITALS
HEART RATE: 71 BPM | SYSTOLIC BLOOD PRESSURE: 119 MMHG | DIASTOLIC BLOOD PRESSURE: 69 MMHG | RESPIRATION RATE: 18 BRPM | OXYGEN SATURATION: 100 %

## 2017-05-01 DIAGNOSIS — R18.8 OTHER ASCITES: ICD-10-CM

## 2017-05-01 LAB
APPEARANCE FLD: CLEAR
BODY FLD TYPE: NORMAL
COLOR FLD: YELLOW
LYMPHOCYTES NFR FLD MANUAL: 51 %
MESOTHL CELL NFR FLD MANUAL: 20 %
MONOS+MACROS NFR FLD MANUAL: 22 %
NEUTROPHILS NFR FLD MANUAL: 7 %
WBC # FLD: 115 /CU MM

## 2017-05-01 PROCEDURE — 49083 ABD PARACENTESIS W/IMAGING: CPT | Mod: ,,, | Performed by: NURSE PRACTITIONER

## 2017-05-01 PROCEDURE — 89051 BODY FLUID CELL COUNT: CPT

## 2017-05-01 PROCEDURE — A7048 VACUUM DRAIN BOTTLE/TUBE KIT: HCPCS

## 2017-05-01 PROCEDURE — C1729 CATH, DRAINAGE: HCPCS

## 2017-05-01 NOTE — H&P
Radiology History & Physical      SUBJECTIVE:     Chief Complaint: Ascites    History of Present Illness:  Fernando Garg is a 66 y.o. male who presents for ultrasound guided paracentesis   Past Medical History:   Diagnosis Date    BPH (benign prostatic hyperplasia)     Cirrhosis of liver with ascites 3/8/2017    Hemochromatosis     Neuropathy      Past Surgical History:   Procedure Laterality Date    COLONOSCOPY  2012    no polyps, [facility]rs[facility]    HERNIA REPAIR      UPPER GASTROINTESTINAL ENDOSCOPY         Home Meds:   Prior to Admission medications    Medication Sig Start Date End Date Taking? Authorizing Provider   clonazePAM (KLONOPIN) 0.5 MG tablet Take 0.5 mg by mouth 2 (two) times daily as needed for Anxiety.    Historical Provider, MD   furosemide (LASIX) 40 MG tablet Take 1.5 tablets (60 mg total) by mouth once daily. 4/18/17   Nasima Ro NP   gabapentin (NEURONTIN) 300 MG capsule Take 300 mg by mouth 3 (three) times daily.    Historical Provider, MD   Lactobacillus rhamnosus GG (CULTURELLE) 10 billion cell capsule Take 1 capsule by mouth once daily.    Historical Provider, MD   lactulose (CHRONULAC) 20 gram/30 mL Soln Take 30 mLs (20 g total) by mouth 2 (two) times daily. Goal of 3-4 BM's daily 4/27/17 4/27/18  Nasima Ro NP   omeprazole (PRILOSEC) 20 MG capsule Take 1 capsule (20 mg total) by mouth once daily. 4/24/17 4/24/18  Nasima Ro NP   spironolactone (ALDACTONE) 100 MG tablet Take 2 tablets (200 mg total) by mouth once daily. 4/18/17   Nasima Ro NP   tramadol (ULTRAM) 50 mg tablet Take 50 mg by mouth every 6 (six) hours as needed for Pain.    Historical Provider, MD     Anticoagulants/Antiplatelets: no anticoagulation    Allergies:   Review of patient's allergies indicates:   Allergen Reactions    Sulfa (sulfonamide antibiotics) Nausea And Vomiting     Sedation History:  no adverse reactions    Review of Systems:   Hematological: no  known coagulopathies  Respiratory: no shortness of breath  Cardiovascular: no chest pain  Gastrointestinal: no abdominal pain  Genito-Urinary: no dysuria  Musculoskeletal: negative  Neurological: no TIA or stroke symptoms         OBJECTIVE:     Vital Signs (Most Recent)  Pulse: 69 (05/01/17 1013)  Resp: 18 (05/01/17 1013)  BP: 119/70 (05/01/17 1013)  SpO2: 100 % (05/01/17 1013)    Physical Exam:  ASA: 3  Mallampati: na    General: no acute distress  Mental Status: alert and oriented to person, place and time  HEENT: normocephalic, atraumatic  Chest: unlabored breathing  Heart: regular heart rate  Abdomen: nondistended  Extremity: moves all extremities    Laboratory  Lab Results   Component Value Date    INR 1.3 (H) 04/24/2017       Lab Results   Component Value Date    WBC 2.63 (L) 04/24/2017    HGB 12.9 (L) 04/24/2017    HCT 37.2 (L) 04/24/2017     (H) 04/24/2017     (L) 04/24/2017      Lab Results   Component Value Date     (H) 04/24/2017     (L) 04/24/2017    K 3.8 04/24/2017     04/24/2017    CO2 26 04/24/2017    BUN 11 04/24/2017    CREATININE 1.1 04/24/2017    CALCIUM 8.9 04/24/2017    ALT 17 04/24/2017    AST 39 04/24/2017    ALBUMIN 3.0 (L) 04/24/2017    BILITOT 2.1 (H) 04/24/2017    BILIDIR 0.9 (H) 04/24/2017       ASSESSMENT/PLAN:     Sedation Plan: local  Patient will undergo ultrasound guided paracentesis.    SHELLY Posadas, FNP  Interventional Radiology  [phone] [employer]

## 2017-05-01 NOTE — PROCEDURES
Radiology Post-Procedure Note    Pre Op Diagnosis: Ascites  Post Op Diagnosis: Same    Procedure: Ultrasound Guided Paracentesis    Procedure performed by: Ren Sy NP / Walker Allan MD    Written Informed Consent Obtained: Yes  Specimen Removed: YES clear yellow fluid   Estimated Blood Loss: Minimal    Findings:   Successful paracentesis.  Albumin administered PRN per protocol.    Patient tolerated procedure well.    SHELLY Posadas, FNP  Interventional Radiology  [phone] [employer]

## 2017-05-01 NOTE — PROGRESS NOTES
Paracentesis complete. 4000 mLs peritoneal fluid drained. Pt tolerated well. Dressing to clean, dry, and intact.  Specimens sent per lab order. Discharge instructions and handouts provided. Pt verbalized understanding.

## 2017-05-01 NOTE — IP AVS SNAPSHOT
[facility]  [address]  [facility] [address]  Phone: [phone]           Patient Discharge Instructions   Our goal is to set you up for success. This packet includes information on your condition, medications, and your home care.  It will help you care for yourself to prevent having to return to the hospital.     Please ask your nurse if you have any questions.      There are many details to remember when preparing to leave the hospital. Here is what you will need to do:    1. Take your medicine. If you are prescribed medications, review your Medication List on the following pages. You may have new medications to  at the pharmacy and others that you'll need to stop taking. Review the instructions for how and when to take your medications. Talk with your doctor or nurses if you are unsure of what to do.     2. Go to your follow-up appointments. Specific follow-up information is listed in the following pages. Your may be contacted by a nurse or clinical provider about future appointments. Be sure we have all of the phone numbers to reach you. Please contact your provider's office if you are unable to make an appointment.     3. Watch for warning signs. Your doctor or nurse will give you detailed warning signs to watch for and when to call for assistance. These instructions may also include educational information about your condition. If you experience any of warning signs to your health, call your doctor.           [facility]  Unless otherwise directed by your provider, please   contact [facility], our nurse care line   that is available for 24/7 assistance.     [phone] (toll-free)     Registered nurses in the [facility]   provide: appointment scheduling, clinical advisement, health education, and other advisory services.                  ** Verify the list of medication(s) below is accurate and up to date. Carry this with you in case of  emergency. If your medications have changed, please notify your healthcare provider.             Medication List      TAKE these medications        Additional Info                      clonazePAM 0.5 MG tablet   Commonly known as:  KLONOPIN   Refills:  0   Dose:  0.5 mg    Instructions:  Take 0.5 mg by mouth 2 (two) times daily as needed for Anxiety.     Begin Date    AM    Noon    PM    Bedtime       furosemide 40 MG tablet   Commonly known as:  LASIX   Quantity:  45 tablet   Refills:  1   Dose:  60 mg    Instructions:  Take 1.5 tablets (60 mg total) by mouth once daily.     Begin Date    AM    Noon    PM    Bedtime       gabapentin 300 MG capsule   Commonly known as:  NEURONTIN   Refills:  0   Dose:  300 mg    Instructions:  Take 300 mg by mouth 3 (three) times daily.     Begin Date    AM    Noon    PM    Bedtime       Lactobacillus rhamnosus GG 10 billion cell capsule   Commonly known as:  CULTURELLE   Refills:  0   Dose:  1 capsule    Instructions:  Take 1 capsule by mouth once daily.     Begin Date    AM    Noon    PM    Bedtime       lactulose 20 gram/30 mL Soln   Commonly known as:  CHRONULAC   Quantity:  1800 mL   Refills:  5   Dose:  20 g    Instructions:  Take 30 mLs (20 g total) by mouth 2 (two) times daily. Goal of 3-4 BM's daily     Begin Date    AM    Noon    PM    Bedtime       omeprazole 20 MG capsule   Commonly known as:  PRILOSEC   Quantity:  30 capsule   Refills:  3   Dose:  20 mg    Instructions:  Take 1 capsule (20 mg total) by mouth once daily.     Begin Date    AM    Noon    PM    Bedtime       spironolactone 100 MG tablet   Commonly known as:  ALDACTONE   Quantity:  60 tablet   Refills:  1   Dose:  200 mg    Instructions:  Take 2 tablets (200 mg total) by mouth once daily.     Begin Date    AM    Noon    PM    Bedtime       tramadol 50 mg tablet   Commonly known as:  ULTRAM   Refills:  0   Dose:  50 mg    Instructions:  Take 50 mg by mouth every 6 (six) hours as needed for Pain.     Begin  Date    AM    Noon    PM    Bedtime                  Please bring to all follow up appointments:    1. A copy of your discharge instructions.  2. All medicines you are currently taking in their original bottles.  3. Identification and insurance card.    Please arrive 15 minutes ahead of scheduled appointment time.    Please call 24 hours in advance if you must reschedule your appointment and/or time.        Your Scheduled Appointments     Jul 07, 2017  9:00 AM CDT   Mri Abd Cont with NS MRI1   [facility] ([facility] [place])    [address] [facility] [address] [place] [address]   [phone]            Jul 14, 2017  1:40 PM CDT   Established Patient Visit with CATRACHO Knox wendi - Hepatology ([facility] )    [address]  [facility] [address] [phone]                  Discharge Instructions       For scheduling: Call Mónica at [phone]    For questions or concerns call: ROCU MON-FRI 8 AM- 5PM [phone]. Radiology resident on call [phone].    For immediate concerns that are not emergent, you may call our radiology clinic at: [phone]      Discharge References/Attachments     PARACENTESIS, DISCHARGE INSTRUCTIONS FOR (ENGLISH)        Admission Information     Date & Time Provider Department CSN    5/1/2017 10:00 AM Nasima Ro NP [facility] [id number]      Care Providers     Provider Role Specialty Primary office phone    Nasima Ro NP Attending Provider Hepatology [phone]      Your Vitals Were     BP Pulse Resp SpO2          119/70 69 18 100%        Recent Lab Values     No lab values to display.      Pending Labs     Order Current Status    WBC & Diff,Body Fluid Ascites In process      Allergies as of 5/1/2017        Reactions    Sulfa (Sulfonamide Antibiotics) Nausea And Vomiting      Advance Directives     An advance directive is a document which, in the event you are no longer able to make decisions for  yourself, tells your healthcare team what kind of treatment you do or do not want to receive, or who you would like to make those decisions for you.  If you do not currently have an advance directive, [facility] encourages you to create one.  For more information call:  [phone]), [phone] ([phone]),  or log on to [email or web address].        Smoking Cessation     If you would like to quit smoking:   You may be eligible for free services if you are a [place] resident and started smoking cigarettes before September 1, 1988.  Call the Smoking Cessation Trust ([facility]) toll free at [phone] or [phone] [phone].   Call 6-028-QUIT-NOW if you do not meet the above criteria.   Contact us via email: [email or web address]   View our website for more information: [email or web address]        Language Assistance Services     ATTENTION: Language assistance services are available, free of charge. Please call [phone].      ATENCIÓN: Si habla español, tiene a moreno disposición servicios gratuitos de asistencia lingüística. Llame al [phone].     CHÚ Ý: N?u b?n nói Ti?ng Vi?t, có các d?ch v? h? tr? ngôn ng? mi?n phí dành cho b?n. G?i s? [phone].        [facility] Sign-Up     Activating your [facility] account is as easy as 1-2-3!     1) Visit [email or web address], select Sign Up Now, enter this activation code and your date of birth, then select Next.  [email or web address]  Expires: 6/8/2017  3:11 PM      2) Create a username and password to use when you visit [facility] in the future and select a security question in case you lose your password and select Next.    3) Enter your e-mail address and click Sign Up!    Additional Information  If you have questions, please e-mail [email or web address] or call [phone] to talk to our [facility] staff. Remember, [facility] is NOT to be used for urgent needs. For medical emergencies, dial 911.          [facility] complies  with applicable Federal civil rights laws and does not discriminate on the basis of race, color, national origin, age, disability, or sex.

## 2017-05-01 NOTE — DISCHARGE INSTRUCTIONS
For scheduling: Call Mónica at [phone]    For questions or concerns call: DEEPTI MON-FRI 8 AM- 5PM [phone]. Radiology resident on call [phone].    For immediate concerns that are not emergent, you may call our radiology clinic at: [phone]

## 2017-05-17 PROBLEM — K70.31 ASCITES DUE TO ALCOHOLIC CIRRHOSIS: Status: ACTIVE | Noted: 2017-05-17

## 2017-05-17 PROBLEM — K70.31 ALCOHOLIC CIRRHOSIS OF LIVER WITH ASCITES: Status: ACTIVE | Noted: 2017-03-08

## 2017-05-25 ENCOUNTER — TELEPHONE (OUTPATIENT)
Dept: HEPATOLOGY | Facility: CLINIC | Age: 67
End: 2017-05-25

## 2017-05-25 NOTE — TELEPHONE ENCOUNTER
Pt called. Was in ER last night. ER note reviewed. Pt wants to get another paracentesis next week.     Please call him to schedule para for next week and lab in 2 weeks for CMP.

## 2017-05-25 NOTE — TELEPHONE ENCOUNTER
MA called patient to inform him that he is scheduled to do PARA 5/30/17 at 9:00 am. And labs 6/7/17. Mailed appt reminder to patient.[email or web address]

## 2017-05-30 ENCOUNTER — HOSPITAL ENCOUNTER (OUTPATIENT)
Dept: INTERVENTIONAL RADIOLOGY/VASCULAR | Facility: HOSPITAL | Age: 67
Discharge: HOME | End: 2017-05-30
Attending: NURSE PRACTITIONER
Payer: MEDICARE

## 2017-05-30 VITALS
RESPIRATION RATE: 16 BRPM | HEART RATE: 67 BPM | OXYGEN SATURATION: 100 % | SYSTOLIC BLOOD PRESSURE: 112 MMHG | DIASTOLIC BLOOD PRESSURE: 57 MMHG

## 2017-05-30 DIAGNOSIS — R18.8 OTHER ASCITES: ICD-10-CM

## 2017-05-30 LAB
APPEARANCE FLD: CLEAR
BODY FLD TYPE: NORMAL
COLOR FLD: NORMAL
LYMPHOCYTES NFR FLD MANUAL: 74 %
MESOTHL CELL NFR FLD MANUAL: 6 %
MONOS+MACROS NFR FLD MANUAL: 13 %
NEUTROPHILS NFR FLD MANUAL: 7 %
WBC # FLD: 121 /CU MM

## 2017-05-30 PROCEDURE — 89051 BODY FLUID CELL COUNT: CPT

## 2017-05-30 PROCEDURE — A7048 VACUUM DRAIN BOTTLE/TUBE KIT: HCPCS

## 2017-05-30 PROCEDURE — C1729 CATH, DRAINAGE: HCPCS

## 2017-05-30 PROCEDURE — 49083 ABD PARACENTESIS W/IMAGING: CPT | Mod: GC,,, | Performed by: RADIOLOGY

## 2017-05-30 NOTE — DISCHARGE INSTRUCTIONS
For scheduling: Call Mónica at [phone]    For questions or concerns call: DEEPTI MON-FRI 8 AM- 5PM [phone]. Radiology resident on call [phone].    For immediate concerns that are not emergent, you may call our radiology clinic at: [phone]

## 2017-05-30 NOTE — PROCEDURES
"Radiology Post-Procedure Note    Pre Op Diagnosis: Ascites  Post Op Diagnosis: Same    Procedure: Paracentesis    Procedure performed by: Franky Dao MD and Lv Anne MD    Written Informed Consent Obtained: Yes  Specimen Removed: YES 20 cc, please see imaging report for total removed  Estimated Blood Loss: Minimal    Findings:   Successful paracentesis.  Albumin administered PRN per protocol.    Patient tolerated procedure well.    Franky Dao MD ([facility])  Radiology PGY-3  [phone]      "

## 2017-05-30 NOTE — PROGRESS NOTES
Pt stable tolerated paracentesis well 3.7 L removed, labs sent. Discharge instructions and follow up care reviewed. Pt verbalized understanding, and denies further questions. Provided  With ROCU and after hours. Pt ambulated to [facility].

## 2017-05-30 NOTE — H&P
Radiology History & Physical      SUBJECTIVE:     Chief Complaint: ascites    History of Present Illness:  Fernando Garg is a 66 y.o. male who presents for paracentesis.    Past Medical History:   Diagnosis Date    BPH (benign prostatic hyperplasia)     Cirrhosis of liver with ascites 3/8/2017    Hemochromatosis     Neuropathy      Past Surgical History:   Procedure Laterality Date    COLONOSCOPY  2012    no polyps, [facility] in [facility] yrs[facility]    HERNIA REPAIR      UPPER GASTROINTESTINAL ENDOSCOPY         Home Meds:   Prior to Admission medications    Medication Sig Start Date End Date Taking? Authorizing Provider   clonazePAM (KLONOPIN) 0.5 MG tablet Take 0.5 mg by mouth 2 (two) times daily as needed for Anxiety.    Historical Provider, MD   furosemide (LASIX) 40 MG tablet Take 1.5 tablets (60 mg total) by mouth once daily. 4/18/17   Nasima Ro NP   gabapentin (NEURONTIN) 300 MG capsule Take 300 mg by mouth 3 (three) times daily.    Historical Provider, MD   Lactobacillus rhamnosus GG (CULTURELLE) 10 billion cell capsule Take 1 capsule by mouth once daily.    Historical Provider, MD   lactulose (CHRONULAC) 20 gram/30 mL Soln Take 30 mLs (20 g total) by mouth 2 (two) times daily. Goal of 3-4 BM's daily 4/27/17 4/27/18  Nasima Ro NP   omeprazole (PRILOSEC) 20 MG capsule Take 1 capsule (20 mg total) by mouth once daily. 4/24/17 4/24/18  Nasima Ro NP   spironolactone (ALDACTONE) 100 MG tablet Take 2 tablets (200 mg total) by mouth once daily. 4/18/17   Nasima Ro NP   tramadol (ULTRAM) 50 mg tablet Take 1 tablet (50 mg total) by mouth every 8 (eight) hours as needed for Pain. 5/25/17 6/4/17  Natasha Argueta NP     Anticoagulants/Antiplatelets: no anticoagulation    Allergies:   Review of patient's allergies indicates:   Allergen Reactions    Sulfa (sulfonamide antibiotics) Nausea And Vomiting     Sedation History:  no adverse reactions    Review of Systems:  "  Hematological: no known coagulopathies  Respiratory: no shortness of breath  Cardiovascular: no chest pain  Gastrointestinal: no abdominal pain  Genito-Urinary: no dysuria  Musculoskeletal: negative  Neurological: no TIA or stroke symptoms         OBJECTIVE:     Vital Signs (Most Recent)  Pulse: 69 (05/30/17 0910)  Resp: 16 (05/30/17 0910)  BP: 120/72 (05/30/17 0910)  SpO2: 100 % (05/30/17 0910)    Physical Exam:  ASA: 2  Mallampati: 2    General: no acute distress  Mental Status: alert and oriented to person, place and time  HEENT: normocephalic, atraumatic  Chest: unlabored breathing  Heart: regular heart rate  Abdomen: nondistended  Extremity: moves all extremities    Laboratory  Lab Results   Component Value Date    INR 1.3 05/24/2017       Lab Results   Component Value Date    WBC 3.92 05/24/2017    HGB 14.4 05/24/2017    HCT 41.1 05/24/2017     (H) 05/24/2017    PLT 99 (L) 05/24/2017      Lab Results   Component Value Date     (H) 05/24/2017     05/24/2017    K 4.4 05/24/2017     05/24/2017    CO2 23 05/24/2017    BUN 11 05/24/2017    CREATININE 0.82 05/24/2017    CALCIUM 9.2 05/24/2017    ALT 45 (H) 05/24/2017    AST 54 05/24/2017    ALBUMIN 4.0 05/24/2017    BILITOT 2.1 (H) 05/24/2017    BILIDIR 0.9 (H) 04/24/2017       ASSESSMENT/PLAN:     Sedation Plan: local  Patient will undergo paracentesis.    Franky Dao MD ([facility])  Radiology PGY-3  [phone]      "

## 2017-06-07 ENCOUNTER — LAB VISIT (OUTPATIENT)
Dept: LAB | Facility: HOSPITAL | Age: 67
End: 2017-06-07
Attending: FAMILY MEDICINE
Payer: MEDICARE

## 2017-06-07 DIAGNOSIS — E83.119 HEMOCHROMATOSIS, UNSPECIFIED HEMOCHROMATOSIS TYPE: ICD-10-CM

## 2017-06-07 LAB
ALBUMIN SERPL BCP-MCNC: 2.7 G/DL
ALP SERPL-CCNC: 80 U/L
ALT SERPL W/O P-5'-P-CCNC: 22 U/L
ANION GAP SERPL CALC-SCNC: 10 MMOL/L
AST SERPL-CCNC: 48 U/L
BILIRUB SERPL-MCNC: 1.5 MG/DL
BUN SERPL-MCNC: 12 MG/DL
CALCIUM SERPL-MCNC: 8.8 MG/DL
CHLORIDE SERPL-SCNC: 104 MMOL/L
CO2 SERPL-SCNC: 20 MMOL/L
CREAT SERPL-MCNC: 1 MG/DL
EST. GFR  (AFRICAN AMERICAN): >60 ML/MIN/1.73 M^2
EST. GFR  (NON AFRICAN AMERICAN): >60 ML/MIN/1.73 M^2
GLUCOSE SERPL-MCNC: 144 MG/DL
POTASSIUM SERPL-SCNC: 4.6 MMOL/L
PROT SERPL-MCNC: 6.9 G/DL
SODIUM SERPL-SCNC: 134 MMOL/L

## 2017-06-07 PROCEDURE — 36415 COLL VENOUS BLD VENIPUNCTURE: CPT | Mod: PO

## 2017-06-07 PROCEDURE — 80053 COMPREHEN METABOLIC PANEL: CPT

## 2017-06-09 ENCOUNTER — TELEPHONE (OUTPATIENT)
Dept: HEPATOLOGY | Facility: CLINIC | Age: 67
End: 2017-06-09

## 2017-06-09 DIAGNOSIS — K74.60 CIRRHOSIS OF LIVER WITHOUT ASCITES, UNSPECIFIED HEPATIC CIRRHOSIS TYPE: ICD-10-CM

## 2017-06-09 DIAGNOSIS — R18.8 OTHER ASCITES: ICD-10-CM

## 2017-06-09 RX ORDER — FUROSEMIDE 40 MG/1
60 TABLET ORAL DAILY
Qty: 45 TABLET | Refills: 1 | Status: SHIPPED | OUTPATIENT
Start: 2017-06-09 | End: 2017-11-29 | Stop reason: SDUPTHER

## 2017-06-09 RX ORDER — SPIRONOLACTONE 100 MG/1
200 TABLET, FILM COATED ORAL DAILY
Qty: 60 TABLET | Refills: 1 | Status: SHIPPED | OUTPATIENT
Start: 2017-06-09 | End: 2017-11-29 | Stop reason: SDUPTHER

## 2017-06-09 NOTE — TELEPHONE ENCOUNTER
Called pt to review labs and check on swelling. He reports swelling is stable. Will continue same doses of diuretics for now. He will f/u as scheduled. He requested refills of lasix and spironolactone. Rx's sent.    Please call pt to schedule lab appt for same day as MRI for CMP and INR.

## 2017-06-14 PROBLEM — K42.0 INCARCERATED UMBILICAL HERNIA: Status: ACTIVE | Noted: 2017-06-14

## 2017-06-14 PROBLEM — K56.609 SMALL BOWEL OBSTRUCTION: Status: ACTIVE | Noted: 2017-06-14

## 2017-06-15 PROBLEM — K42.0 UMBILICAL HERNIA, INCARCERATED: Status: ACTIVE | Noted: 2017-06-15

## 2017-06-26 ENCOUNTER — TELEPHONE (OUTPATIENT)
Dept: HEPATOLOGY | Facility: CLINIC | Age: 67
End: 2017-06-26

## 2017-06-26 NOTE — TELEPHONE ENCOUNTER
NP will be out on 07/14/2017.     Called pt on home and mobile number. No answer, left message on Vm need to reschedule appt on 07/14. Office number left on .

## 2017-07-05 ENCOUNTER — TELEPHONE (OUTPATIENT)
Dept: HEPATOLOGY | Facility: CLINIC | Age: 67
End: 2017-07-05

## 2017-07-05 NOTE — TELEPHONE ENCOUNTER
MA called patient to inform him that we have schedule his PARA on Monday 7/10/17 at 7:30 am. Patient confirmed the appt. SANDRA

## 2017-07-06 DIAGNOSIS — K76.9 LIVER LESION: Primary | ICD-10-CM

## 2017-07-07 ENCOUNTER — HOSPITAL ENCOUNTER (OUTPATIENT)
Dept: RADIOLOGY | Facility: HOSPITAL | Age: 67
Discharge: HOME | End: 2017-07-07
Attending: NURSE PRACTITIONER
Payer: MEDICARE

## 2017-07-07 DIAGNOSIS — R18.8 OTHER ASCITES: Primary | ICD-10-CM

## 2017-07-07 DIAGNOSIS — K76.9 LIVER LESION: ICD-10-CM

## 2017-07-07 PROCEDURE — 74183 MRI ABD W/O CNTR FLWD CNTR: CPT | Mod: TC,PO

## 2017-07-07 PROCEDURE — A9585 GADOBUTROL INJECTION: HCPCS | Mod: PO | Performed by: NURSE PRACTITIONER

## 2017-07-07 PROCEDURE — 74183 MRI ABD W/O CNTR FLWD CNTR: CPT | Mod: 26,,, | Performed by: RADIOLOGY

## 2017-07-07 PROCEDURE — 25500020 PHARM REV CODE 255: Mod: PO | Performed by: NURSE PRACTITIONER

## 2017-07-07 RX ORDER — GADOBUTROL 604.72 MG/ML
7 INJECTION INTRAVENOUS
Status: COMPLETED | OUTPATIENT
Start: 2017-07-07 | End: 2017-07-07

## 2017-07-07 RX ADMIN — GADOBUTROL 7 ML: 604.72 INJECTION INTRAVENOUS at 09:07

## 2017-07-10 ENCOUNTER — OFFICE VISIT (OUTPATIENT)
Dept: HEPATOLOGY | Facility: CLINIC | Age: 67
End: 2017-07-10
Payer: MEDICARE

## 2017-07-10 ENCOUNTER — HOSPITAL ENCOUNTER (OUTPATIENT)
Dept: INTERVENTIONAL RADIOLOGY/VASCULAR | Facility: HOSPITAL | Age: 67
Discharge: HOME | End: 2017-07-10
Attending: NURSE PRACTITIONER
Payer: MEDICARE

## 2017-07-10 ENCOUNTER — TELEPHONE (OUTPATIENT)
Dept: HEPATOLOGY | Facility: CLINIC | Age: 67
End: 2017-07-10

## 2017-07-10 VITALS
RESPIRATION RATE: 18 BRPM | SYSTOLIC BLOOD PRESSURE: 123 MMHG | OXYGEN SATURATION: 100 % | HEART RATE: 73 BPM | DIASTOLIC BLOOD PRESSURE: 73 MMHG

## 2017-07-10 VITALS
WEIGHT: 157.63 LBS | HEIGHT: 72 IN | OXYGEN SATURATION: 100 % | BODY MASS INDEX: 21.35 KG/M2 | SYSTOLIC BLOOD PRESSURE: 124 MMHG | HEART RATE: 76 BPM | DIASTOLIC BLOOD PRESSURE: 73 MMHG

## 2017-07-10 DIAGNOSIS — K70.31 ALCOHOLIC CIRRHOSIS OF LIVER WITH ASCITES: ICD-10-CM

## 2017-07-10 DIAGNOSIS — R18.8 OTHER ASCITES: ICD-10-CM

## 2017-07-10 DIAGNOSIS — K74.60 CIRRHOSIS OF LIVER WITHOUT ASCITES, UNSPECIFIED HEPATIC CIRRHOSIS TYPE: Primary | ICD-10-CM

## 2017-07-10 DIAGNOSIS — R16.0 LIVER MASSES: ICD-10-CM

## 2017-07-10 DIAGNOSIS — K74.60 CIRRHOSIS OF LIVER WITHOUT ASCITES, UNSPECIFIED HEPATIC CIRRHOSIS TYPE: ICD-10-CM

## 2017-07-10 DIAGNOSIS — E83.110 HEREDITARY HEMOCHROMATOSIS: ICD-10-CM

## 2017-07-10 DIAGNOSIS — R16.0 LIVER MASS: ICD-10-CM

## 2017-07-10 DIAGNOSIS — K42.0 INCARCERATED UMBILICAL HERNIA: ICD-10-CM

## 2017-07-10 DIAGNOSIS — Z78.9 ALCOHOL USE: ICD-10-CM

## 2017-07-10 PROBLEM — K56.609 SMALL BOWEL OBSTRUCTION: Status: RESOLVED | Noted: 2017-06-14 | Resolved: 2017-07-10

## 2017-07-10 PROCEDURE — 1159F MED LIST DOCD IN RCRD: CPT | Mod: ,,, | Performed by: NURSE PRACTITIONER

## 2017-07-10 PROCEDURE — 1126F AMNT PAIN NOTED NONE PRSNT: CPT | Mod: ,,, | Performed by: NURSE PRACTITIONER

## 2017-07-10 PROCEDURE — 99999 PR PBB SHADOW E&M-EST. PATIENT-LVL IV: CPT | Mod: PBBFAC,,, | Performed by: NURSE PRACTITIONER

## 2017-07-10 PROCEDURE — 76705 ECHO EXAM OF ABDOMEN: CPT | Mod: 26,GC,, | Performed by: RADIOLOGY

## 2017-07-10 PROCEDURE — 99214 OFFICE O/P EST MOD 30 MIN: CPT | Mod: PBBFAC,25 | Performed by: NURSE PRACTITIONER

## 2017-07-10 PROCEDURE — 99214 OFFICE O/P EST MOD 30 MIN: CPT | Mod: S$PBB,,, | Performed by: NURSE PRACTITIONER

## 2017-07-10 RX ORDER — LACTULOSE 10 G/15ML
SOLUTION ORAL; RECTAL
Refills: 5 | COMMUNITY
Start: 2017-07-08 | End: 2017-10-17 | Stop reason: SDUPTHER

## 2017-07-10 NOTE — PROGRESS NOTES
Insufficient fluid to safely perform paracentesis at this time.  Pt will reschedule for a later date.

## 2017-07-10 NOTE — DISCHARGE INSTRUCTIONS
For scheduling: Call Mónica at [phone]    For questions or concerns call: DEEPTI MON-FRI 8 AM- 5PM [phone]. Radiology resident on call [phone].    For immediate concerns that are not emergent, you may call our radiology clinic at: [phone]

## 2017-07-10 NOTE — PROGRESS NOTES
[facility] Established Patient Visit    Reason for Visit:  F/u cirrhosis    PCP: Gilo Kawasaki    HPI:  This is a 66 y.o. 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 [facility]. 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 No copies of H63D. His ASMA was (+) at 1:40 and IgG elevated at 2348 but AMA and TUAN negative. He denied drinking any alcohol in the past year but his PETH was (+) at 180 in 3/2017 indicating recent alcohol intake. His MELD was elevated at 16 on initial labs. We initiated clearance for liver transplant evaluation based on this. He has been unable to start evaluation though since he does not have any prescription drug coverage. He can enroll in this in October.     His cirrhosis has been decompensated with ascites. He is currently on lasix 60 mg and spironolactone 200 mg daily. He has also had 5 paracentesis since 3/2017. He was scheduled for one today but no significant fluid seen. He is on lactulose for his constipation. He is taking this once daily but is still constipated.     TPCT on 3/8 revealed 3 indeterminate liver lesions, all 1 cm or less, all hyperenhancing. One with washout on delayed phase. He had MRI 4/11/17 that showed 3 arterial hyperenhancing lesions identified within hepatic segment V best seen on comparison CT exam with the largest measuring approximately 1.5 cm in maximal diameter.  No evidence of washout or pseudocapsule is identified.  No new enhancing lesions are identified. AFP is normal. Scans were reviewed in IR conference with recs to repeat MRI and AFP in 3 months, 7/2017. He had this last week and MRI shows these lesions are all stable in size and number, possibly may be regenerative nodules. No early arterial enhancing nodules seen.     [facility] this for [facility] on the [facility].     Since his last visit, he underwent emergent  umbilical hernia surgery on 6/14/17. He has done well post operatively w/o any further decompensation. He denies jaundice, dark urine, hematemesis, melena, slowed mentation. Does c/o abd distention but no significant ascites today to perform paracentesis.      ROS:   GENERAL: Denies fever, chills, weight change, (+) fatigue  HEENT: Denies headaches, dizziness, vision/hearing changes  CARDIOVASCULAR: Denies chest pain, palpitations, edema  RESPIRATORY: Denies dyspnea, cough  GI: Denies abdominal pain, no rectal bleeding, no nausea, vomiting. (+) constipation  : Denies dysuria, hematuria   SKIN: Denies rash, itching   NEURO: Denies confusion, memory loss, or mood changes  PSYCH: Denies depression or anxiety  HEME/LYMPH: Denies easy bruising or bleeding      PMHX:  has a past medical history of BPH (benign prostatic hyperplasia); Cirrhosis of liver with ascites (3/8/2017); Hemochromatosis; and Neuropathy.    PSHX:  has a past surgical history that includes Hernia repair; Colonoscopy (2012); Upper gastrointestinal endoscopy; and Umbilical hernia repair (06/14/2017).    The patient's social and family histories were reviewed by me and updated in the appropriate section of the electronic medical record.    Review of patient's allergies indicates:   Allergen Reactions    Sulfa (sulfonamide antibiotics) Nausea And Vomiting       Medications reviewed in Epic      Objective Findings:    PHYSICAL EXAM:   Friendly White male, in no acute distress; alert and oriented to person, place and time  VITALS: /73 (BP Location: Left arm, Patient Position: Sitting, BP Method: Automatic)   Pulse 76   Ht 6' (1.829 m)   Wt 71.5 kg (157 lb 10.1 oz)   SpO2 100%   BMI 21.38 kg/m²   HENT: Normocephalic, without obvious abnormality. Oral mucosa pink and moist. Dentition good.  EYES: Sclerae mildly icteric. No conjunctival pallor.   NECK: Supple. No masses or cervical adenopathy.  CARDIOVASCULAR: Regular rate and rhythm. No  murmurs.  RESPIRATORY: Normal respiratory effort. BBS CTA. No wheezes or crackles.  GI: Soft, non-tender, mildly distended. (+) hepatosplenomegaly. No masses palpable. No significant ascites.  EXTREMITIES: No clubbing, cyanosis, edema.  SKIN: Warm and dry. No jaundice. (+) dark skin tone. No rashes noted to exposed skin. No telangectasias noted. (+) palmar erythema.  NEURO: Normal gate. No asterixis.   PSYCH: Memory intact. Thought and speech pattern appropriate. Behavior normal. No depression or anxiety noted.       Labs:  Lab Results   Component Value Date    WBC 3.51 (L) 06/14/2017    HGB 14.2 06/14/2017    HCT 39.4 (L) 06/14/2017    PLT 97 (L) 06/14/2017     (L) 07/07/2017    K 4.0 07/07/2017    CREATININE 1.0 07/07/2017    CREATININE 1.0 07/07/2017    ALT 22 07/07/2017    AST 40 07/07/2017    ALKPHOS 74 07/07/2017    BILITOT 1.9 (H) 07/07/2017    ALBUMIN 3.1 (L) 07/07/2017    INR 1.3 (H) 07/07/2017    AFP 6.9 03/08/2017     MRI 7/7/17  Findings:    There is a micronodular contour of the liver, and the liver is diffusely T2 hypointense relative to the spleen compatible with hepatic cirrhosis.  The current examination once again demonstrates at least 4 nodular T1 hyperintense foci of signal abnormality within the right hepatic lobe which are unchanged in size, number, or character when compared to the previous study.  All of these lesions are hyperintense on the T1 precontrast LAVA images compatible with probable regenerative nodules.  These nodules do not demonstrate significant enhancement on the early arterial phase images.  The previously noted index lesions are as follows: 9 mm (series 9 image 46 and series 1100 image 46), 7 mm (series 9 image 51 and series 1100 image 51), 10 x 11 mm (series 9 image 42 and series 1100 image 42), and 8 mm (series 1100 image 40 and series 9 image 40).  These nodules show no associated T2 signal hyperintensity and show no significant washout on the delayed phase images.  " The portal vein is patent.  There is a diffusely heterogeneous hepatic parenchyma.  The liver appears more hypointense on the in phase images than on the opposed phase images suggesting possible primary hemochromatosis. No biliary dilatation.  There is mild diffuse gallbladder wall thickening which could relate to a large volume of intra-abdominal ascites fluid.  The spleen is unremarkable.  The adrenal glands show no definite abnormality.    The pancreas is normal in appearance.  No biliary or pancreatic ductal dilatation.  The abdominal aorta is nonaneurysmal.  No periaortic/retroperitoneal lymphadenopathy.  There are collateral vessels present within the ventral peritoneum.  There is recanalization of the umbilical vein.  The kidneys show no significant abnormalities.   Impression         1.  The aforementioned nodular foci of "enhancement" noted at the time of the prior study are stable in size but show no demonstrable enhancement on today's examination and appear T1 hyperintense on the precontrast images, possibly regenerative nodules.  No early arterial phase enhancing hepatic nodules which demonstrate washout in this patient with imaging findings of hepatic cirrhosis, possibly due to underlying primary hemochromatosis.  Please correlate clinically.  Consideration could be given to performing a followup examination in 6-12 months to ensure stability.    2.  Large volume of intra-abdominal ascites which likely explains diffuse gallbladder wall thickening.    3.  Recanalization of the umbilical vein with collateral vessels observed in the ventral peritoneum.         ASSESSMENT:  66 y.o. White male with:  1.  Cirrhosis, decompensated with ascites  -- MELD - MELD-Na score: 14 at 7/7/2017  8:19 AM  MELD score: 12 at 7/7/2017  8:19 AM  Calculated from:  Serum Creatinine: 1.0 mg/dL at 7/7/2017  8:19 AM  Serum Sodium: 135 mmol/L at 7/7/2017  8:19 AM  Total Bilirubin: 1.9 mg/dL at 7/7/2017  8:19 AM  INR(ratio): 1.3 at " 7/7/2017  8:19 AM  Age: 66 years  -- HCC screening- AFP and abd. U/S - see below  -- (+) Immunity to Hep A and B per labs  -- EGD - 3/16/17 - no varices  2. Hemochromatosis  -- [facility] 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 No copies of H63D  3. Ascites  -- SAAG > 1.1 c/w portal HTN etiology on diagnostic and therapeutic para  -- s/p 5 jacinda since 3/9/17. On lasix 60 mg and spironolactone 200 mg daily   4. Liver masses  -- TPCT 3/8/17 - 3 indeterminate liver lesions, all 1 cm or less, all hyperenhancing. One with washout on delayed phase.   -- MRI 4/11/17 - 3 indeterminant arterial enhancing liver lesions within hepatic segment V, no washout or pseudocapsule  -- MRI 7/7/17 - lesions are all stable in size and number, possibly may be regenerative nodules. No early arterial enhancing nodules seen  -- AFP nl 3/2017, will add to labs last week  5. Alcohol use, PETH (+) at initial visit 3/2017  -- will continue to monitor with next lab  -- has been referred for liver transplant evaluation d/t elevated MELD but needs to be abstinent of alcohol in order to undergo liver transplant eval  6. S/p emergent umbilical hernia repair 6/14/17 d/t incarceration  -- did not develop any worsening decompensation post op  7. Constipation  -- continue lactulose but increase dose to 2-3 x per day      EDUCATION:   Signs and symptoms of hepatic decompensation were reviewed, including jaundice, ascites, and slowed mentation due to hepatic encephalopathy. The patient should seek medical attention if any of these things occur.  We discussed the potential for bleeding from esophageal varices with symptoms of hematemesis and melena. The patient should report to the Emergency Department for these symptoms.    We discussed the increased risk of hepatocellular carcinoma due  to cirrhosis. Continued screening every six months with ultrasound and AFP is recommended.     No alcohol or raw seafood.  Tylenol/acetaminophen as needed for pain, up to 2000 mg daily    Discussed use of the MELD score and its role in the management and determining the prognosis of cirrhosis.       PLAN:  1. Will add AFP to last week's labs if I can  2. Will submit for IR conference review next week but repeat MRI looks reassuring that these may be regenerative nodules  3. Continue lactulose 30 cc BID-TID for constipation  4. Continue lasix 60 mg and spironolactone 200 mg daily  5. [facility]   6. Check labs in 6 weeks for PETH, CBC, CMP, INR, AFP  7. Pt needs to obtain Rx drug coverage in order to be able to proceed with liver transplant evaluation. Open enrollment for Medicare is not until 10/2017  8. F/u with me in 3 months. Will plan for repeat MRI in 3 months also unless different recommendations are made by IR conference      Thank you for allowing me to participate in the care of Fernando Ro NP-C     Total duration of visit = 45 min, with > 50% spent counseling

## 2017-07-10 NOTE — TELEPHONE ENCOUNTER
Patient: Fernando Garg       MRN: [id number]      : 1950     Age: 66 y.o.  [address] [place] [address]    Provider: DEMI - Nasima Ro NP    Patient Transplant Status: Not a candidate    Reason for presentation: Indeterminate lesion    Clinical Summary: 67 yo with decompensated cirrhosis likely due to HH and/or alcohol. He is decompensated with ascites. TPCT in 3/2017 revealed 3 small liver lesions. AFP nl 6.9. His MELD was 16 on initial labs so he is being referred for transplant evaluation but d/t no drug coverage, he has not been evaluated. He was reviewed in IR conference on 17 after MRI was done that showed 3 arterial hyperenhancing lesions identified within hepatic segment V best seen on comparison CT exam with the largest measuring approximately 1.5 cm in maximal diameter.  No evidence of washout or pseudocapsule is identified.  No new enhancing lesions are identified. Repeat MRI and AFP in 3 months advised. He had this last week and findings appear stable, possible regenerative nodules. MELD now 14.    Imaging to be reviewed: TPCT 3/8/17, MRI 17, MRI 17    HCC Treatment History: n/a    ABO: A POS    Platelets:   Lab Results   Component Value Date/Time    PLT 97 (L) 2017 07:46 PM     Creatinine:   Lab Results   Component Value Date/Time    CREATININE 1.0 2017 08:19 AM    CREATININE 1.0 2017 08:19 AM     Bilirubin:   Lab Results   Component Value Date/Time    BILITOT 1.9 (H) 2017 08:19 AM     AFP Last 3 each if available:   Lab Results   Component Value Date/Time    AFP 6.9 2017 10:20 AM       MELD: MELD-Na score: 14 at 2017  8:19 AM  MELD score: 12 at 2017  8:19 AM  Calculated from:  Serum Creatinine: 1.0 mg/dL at 2017  8:19 AM  Serum Sodium: 135 mmol/L at 2017  8:19 AM  Total Bilirubin: 1.9 mg/dL at 2017  8:19 AM  INR(ratio): 1.3 at 2017  8:19 AM  Age: 66 years    Plan:     Follow-up Provider:

## 2017-07-11 ENCOUNTER — TELEPHONE (OUTPATIENT)
Dept: HEPATOLOGY | Facility: CLINIC | Age: 67
End: 2017-07-11

## 2017-07-11 NOTE — TELEPHONE ENCOUNTER
----- Message from Nasima Ro NP sent at 7/11/2017 12:41 PM CDT -----  Please inform pt that the labs are stable.

## 2017-07-13 ENCOUNTER — TELEPHONE (OUTPATIENT)
Dept: HEPATOLOGY | Facility: CLINIC | Age: 67
End: 2017-07-13

## 2017-07-13 NOTE — TELEPHONE ENCOUNTER
----- Message from Judi Garcia sent at 7/13/2017  3:11 PM CDT -----  Yes, there should be a va referral form he needs to bring in.     Thanks   ----- Message -----  From: Nasima Ro NP  Sent: 7/13/2017  12:20 PM  To: Judi Garcia    Oh, Ok. He tried to give me something at his visit but it looked like an appeal form for services so I didn't know what to do with that. I'll ask him to get it to me.    Thanks,  Nasiam    ----- Message -----  From: Judi Garcia  Sent: 7/12/2017   2:06 PM  To: Nasima Ro NP    There is a VA referral form. I gave a copy to the patient.  He will have to give you that  VA referral form to be completed by the rendering physican with clinical that will have to be faxed to VA with the referral form.   The pt should have it  ----- Message -----  From: Nasima Ro NP  Sent: 7/10/2017   3:56 PM  To: Judi Garcia    So he never came [facility] from the [facility]. He got to my office after being seen in primary care on the [facility] and they made referral to us.     How does our office go about getting a referral to be made to us. Shouldn't he contact the [facility] and have them make the referral to be seen at [facility]?    I'm sorry, Just wasn't sure how to help him and couldn't really understand what he was telling me in the office today.    Nasima  ----- Message -----  From: Judi Garcia  Sent: 7/10/2017  11:15 AM  To: Nasima Ro NP    Con: this pt came in to verify if for his MRI's have referral from [facility] been obtained.  He is requesting that you retro a referral from the last 3 months of services.  He states that VA will cover the difference but he has to have referrals..    Can someone look into obtain a referral from the [facility] for this patient.

## 2017-07-14 ENCOUNTER — CONFERENCE (OUTPATIENT)
Dept: TRANSPLANT | Facility: CLINIC | Age: 67
End: 2017-07-14

## 2017-07-14 NOTE — TELEPHONE ENCOUNTER
Patient: Fernando Garg       MRN: [id number]      : 1950     Age: 66 y.o.  [address] [place] [address]    Provider: [facility]I - Nasima Ro NP    Patient Transplant Status: Not a candidate    Reason for presentation: Indeterminate lesion    Clinical Summary: 65 yo with decompensated cirrhosis likely due to HH and/or alcohol. He is decompensated with ascites. TPCT in 3/2017 revealed 3 small liver lesions. AFP nl 6.9. His MELD was 16 on initial labs so he is being referred for transplant evaluation but d/t no drug coverage, he has not been evaluated. He was reviewed in IR conference on 17 after MRI was done that showed 3 arterial hyperenhancing lesions identified within hepatic segment V best seen on comparison CT exam with the largest measuring approximately 1.5 cm in maximal diameter.  No evidence of washout or pseudocapsule is identified.  No new enhancing lesions are identified. Repeat MRI and AFP in 3 months advised. He had this last week and findings appear stable, possible regenerative nodules. MELD now 14.    Imaging to be reviewed: TPCT 3/8/17, MRI 17, MRI 17    HCC Treatment History: n/a    ABO: A POS    Platelets:   Lab Results   Component Value Date/Time    PLT 97 (L) 2017 07:46 PM     Creatinine:   Lab Results   Component Value Date/Time    CREATININE 1.0 2017 08:19 AM    CREATININE 1.0 2017 08:19 AM     Bilirubin:   Lab Results   Component Value Date/Time    BILITOT 1.9 (H) 2017 08:19 AM     AFP Last 3 each if available:   Lab Results   Component Value Date/Time    AFP 4.8 2017 08:04 AM    AFP 6.9 2017 10:20 AM       MELD: MELD-Na score: 14 at 2017  8:19 AM  MELD score: 12 at 2017  8:19 AM  Calculated from:  Serum Creatinine: 1.0 mg/dL at 2017  8:19 AM  Serum Sodium: 135 mmol/L at 2017  8:19 AM  Total Bilirubin: 1.9 mg/dL at 2017  8:19 AM  INR(ratio): 1.3 at 2017  8:19 AM  Age: 66 years    Plan: 4 nodules  ranging in size from sub-cm to 1.1 cm, demonstrating intrinsic t1 hyperintense signal and thus uncertain if they actually enhance.  No washout or capsule.  No significant change relative to MRI 4/11/17.  These may simply represent regenerative nodules.  REC 3 mo f/u MRI with specific request for subtraction to be performed.    Follow-up Provider: Nasima Ro NP

## 2017-07-17 ENCOUNTER — TELEPHONE (OUTPATIENT)
Dept: HEPATOLOGY | Facility: CLINIC | Age: 67
End: 2017-07-17

## 2017-07-17 NOTE — TELEPHONE ENCOUNTER
----- Message from Judi Garcia sent at 7/13/2017  3:25 PM CDT -----    I m sorry I do not have the form. The pt has to bring in the VA form.  I only informed him of what he needed in order to obtain the authorization.  I am sorry   ----- Message -----  From: Nasima Ro NP  Sent: 7/13/2017   3:17 PM  To: Judi Garcia    Can I just get the form from you instead of trying to get it from him?      ----- Message -----  From: Judi Garcia  Sent: 7/12/2017   2:06 PM  To: Nasima Ro NP    There is a VA referral form. I gave a copy to the patient.  He will have to give you that  VA referral form to be completed by the rendering physican with clinical that will have to be faxed to VA with the referral form.   The pt should have it  ----- Message -----  From: Nasima Ro NP  Sent: 7/10/2017   3:56 PM  To: Judi Garcia    So he never came to me from the [facility]. He got to my office after being seen in primary care on the [facility] and they made referral to us.     How does our office go about getting a referral to be made to us. Shouldn't he contact the [facility] and have them make the referral to be seen at [facility]?    I'm sorry, Just wasn't sure how to help him and couldn't really understand what he was telling me in the office today.    Nasima  ----- Message -----  From: Judi Garcia  Sent: 7/10/2017  11:15 AM  To: Nasima Ro NP    Con: this pt came in to verify if for his MRI's have referral from VA been obtained.  He is requesting that you retro a referral from the last 3 months of services.  He states that VA will cover the difference but he has to have referrals..    Can someone look into obtain a referral from the VA for this patient.

## 2017-07-17 NOTE — TELEPHONE ENCOUNTER
"Please call pt to see if he has a form called "VA referral form"    I will need him to either mail, fax, or bring that to us so I can fill it out to get his care here covered from what Eileen has told me per msgs below.  "

## 2017-07-18 ENCOUNTER — TELEPHONE (OUTPATIENT)
Dept: HEPATOLOGY | Facility: CLINIC | Age: 67
End: 2017-07-18

## 2017-07-18 DIAGNOSIS — R16.0 LIVER MASSES: Primary | ICD-10-CM

## 2017-07-18 NOTE — TELEPHONE ENCOUNTER
Pt's case reviewed in IR conference:    Plan: 4 nodules ranging in size from sub-cm to 1.1 cm, demonstrating intrinsic t1 hyperintense signal and thus uncertain if they actually enhance.  No washout or capsule.  No significant change relative to MRI 4/11/17.  These may simply represent regenerative nodules.  REC 3 mo f/u MRI with specific request for subtraction to be performed.    Call placed to pt and to discuss results and recommendations. No answer. Left VM for call back. Will repeat MRI in 3 months as scheduled. Comments placed in orders for subtraction to be performed as recommended.

## 2017-07-19 NOTE — TELEPHONE ENCOUNTER
MA called patient to schedule his MRI, patient accepted that OCT 10 appointment in [place].     Patient will have his VA form to be sent to us. Patient understood.     Mailed appt reminder to patient. SANDRA

## 2017-08-07 ENCOUNTER — TELEPHONE (OUTPATIENT)
Dept: HEPATOLOGY | Facility: CLINIC | Age: 67
End: 2017-08-07

## 2017-08-07 NOTE — TELEPHONE ENCOUNTER
----- Message from Nasima Ro NP sent at 8/7/2017  4:09 PM CDT -----  Contact: pt  I don't recall trying to call him. Tell him to disregard I guess.      ----- Message -----  From: Thi Gilbert LPN  Sent: 8/7/2017   3:17 PM  To: Nasima Ro NP    Patient says you tried to call him on Friday and he was out of town. Patient does not know what the call is in reference to, he was just returning your call. Informed patient that I will send Nasima a message. Patient verbalizes understanding.  No note seen regarding call.  ----- Message -----  From: Afia Zazueta  Sent: 8/7/2017  12:59 PM  To: [facility] Clinical Staff    Said he is returning call from Nasima from last Friday, was out of town, please call pt back @ # [phone].

## 2017-08-07 NOTE — TELEPHONE ENCOUNTER
Called and spoke with patient. Informed patient to disregard message. Provider had not contacted him. Patient verbalizes understanding, says thank you for looking into it for me.

## 2017-08-18 ENCOUNTER — LAB VISIT (OUTPATIENT)
Dept: LAB | Facility: HOSPITAL | Age: 67
End: 2017-08-18
Attending: NURSE PRACTITIONER
Payer: MEDICARE

## 2017-08-18 DIAGNOSIS — R16.0 LIVER MASS: ICD-10-CM

## 2017-08-18 DIAGNOSIS — E83.110 HEREDITARY HEMOCHROMATOSIS: ICD-10-CM

## 2017-08-18 DIAGNOSIS — K74.60 CIRRHOSIS OF LIVER WITHOUT ASCITES, UNSPECIFIED HEPATIC CIRRHOSIS TYPE: ICD-10-CM

## 2017-08-18 LAB
AFP SERPL-MCNC: 4.9 NG/ML
ALBUMIN SERPL BCP-MCNC: 3 G/DL
ALP SERPL-CCNC: 90 U/L
ALT SERPL W/O P-5'-P-CCNC: 14 U/L
ANION GAP SERPL CALC-SCNC: 9 MMOL/L
AST SERPL-CCNC: 30 U/L
BASOPHILS # BLD AUTO: 0.04 K/UL
BASOPHILS NFR BLD: 1.2 %
BILIRUB SERPL-MCNC: 2 MG/DL
BUN SERPL-MCNC: 10 MG/DL
CALCIUM SERPL-MCNC: 9.1 MG/DL
CHLORIDE SERPL-SCNC: 103 MMOL/L
CO2 SERPL-SCNC: 23 MMOL/L
CREAT SERPL-MCNC: 1 MG/DL
DIFFERENTIAL METHOD: ABNORMAL
EOSINOPHIL # BLD AUTO: 0.2 K/UL
EOSINOPHIL NFR BLD: 4.4 %
ERYTHROCYTE [DISTWIDTH] IN BLOOD BY AUTOMATED COUNT: 15.3 %
EST. GFR  (AFRICAN AMERICAN): >60 ML/MIN/1.73 M^2
EST. GFR  (NON AFRICAN AMERICAN): >60 ML/MIN/1.73 M^2
FERRITIN SERPL-MCNC: 769 NG/ML
GLUCOSE SERPL-MCNC: 204 MG/DL
HCT VFR BLD AUTO: 36.9 %
HGB BLD-MCNC: 13.3 G/DL
INR PPP: 1.3
LYMPHOCYTES # BLD AUTO: 1 K/UL
LYMPHOCYTES NFR BLD: 30.5 %
MCH RBC QN AUTO: 36.1 PG
MCHC RBC AUTO-ENTMCNC: 36 G/DL
MCV RBC AUTO: 100 FL
MONOCYTES # BLD AUTO: 0.2 K/UL
MONOCYTES NFR BLD: 7 %
NEUTROPHILS # BLD AUTO: 1.9 K/UL
NEUTROPHILS NFR BLD: 56.9 %
PLATELET # BLD AUTO: 101 K/UL
PMV BLD AUTO: 9.3 FL
POTASSIUM SERPL-SCNC: 4.1 MMOL/L
PROT SERPL-MCNC: 7.2 G/DL
PROTHROMBIN TIME: 13.4 SEC
RBC # BLD AUTO: 3.68 M/UL
SODIUM SERPL-SCNC: 135 MMOL/L
WBC # BLD AUTO: 3.41 K/UL

## 2017-08-18 PROCEDURE — 80321 ALCOHOLS BIOMARKERS 1OR 2: CPT

## 2017-08-18 PROCEDURE — 82728 ASSAY OF FERRITIN: CPT

## 2017-08-18 PROCEDURE — 36415 COLL VENOUS BLD VENIPUNCTURE: CPT | Mod: PO

## 2017-08-18 PROCEDURE — 82105 ALPHA-FETOPROTEIN SERUM: CPT

## 2017-08-18 PROCEDURE — 85025 COMPLETE CBC W/AUTO DIFF WBC: CPT

## 2017-08-18 PROCEDURE — 80053 COMPREHEN METABOLIC PANEL: CPT

## 2017-08-18 PROCEDURE — 85610 PROTHROMBIN TIME: CPT | Mod: PO

## 2017-08-21 ENCOUNTER — TELEPHONE (OUTPATIENT)
Dept: HEPATOLOGY | Facility: CLINIC | Age: 67
End: 2017-08-21

## 2017-08-21 NOTE — TELEPHONE ENCOUNTER
----- Message from Nasima Ro, CATRACHO sent at 8/21/2017  8:53 AM CDT -----  Please inform pt that the labs are stable. Ferritin improving

## 2017-08-28 LAB — PHOSPHATIDYLETHANOL (PETH): NEGATIVE NG/ML

## 2017-09-15 ENCOUNTER — TELEPHONE (OUTPATIENT)
Dept: INTERVENTIONAL RADIOLOGY/VASCULAR | Facility: HOSPITAL | Age: 67
End: 2017-09-15

## 2017-09-19 DIAGNOSIS — R18.8 OTHER ASCITES: Primary | ICD-10-CM

## 2017-09-20 ENCOUNTER — HOSPITAL ENCOUNTER (OUTPATIENT)
Dept: INTERVENTIONAL RADIOLOGY/VASCULAR | Facility: HOSPITAL | Age: 67
Discharge: HOME | End: 2017-09-20
Attending: NURSE PRACTITIONER
Payer: MEDICARE

## 2017-09-20 VITALS
OXYGEN SATURATION: 100 % | HEART RATE: 66 BPM | DIASTOLIC BLOOD PRESSURE: 69 MMHG | SYSTOLIC BLOOD PRESSURE: 110 MMHG | RESPIRATION RATE: 18 BRPM

## 2017-09-20 DIAGNOSIS — R18.8 OTHER ASCITES: ICD-10-CM

## 2017-09-20 PROCEDURE — P9047 ALBUMIN (HUMAN), 25%, 50ML: HCPCS | Performed by: NURSE PRACTITIONER

## 2017-09-20 PROCEDURE — C1729 CATH, DRAINAGE: HCPCS

## 2017-09-20 PROCEDURE — A7048 VACUUM DRAIN BOTTLE/TUBE KIT: HCPCS

## 2017-09-20 PROCEDURE — 49083 ABD PARACENTESIS W/IMAGING: CPT | Mod: ,,, | Performed by: FAMILY MEDICINE

## 2017-09-20 PROCEDURE — 63600175 PHARM REV CODE 636 W HCPCS: Performed by: NURSE PRACTITIONER

## 2017-09-20 RX ORDER — ALBUMIN HUMAN 250 G/1000ML
25 SOLUTION INTRAVENOUS
Status: DISCONTINUED | OUTPATIENT
Start: 2017-09-20 | End: 2017-09-21 | Stop reason: HOSPADM

## 2017-09-20 RX ADMIN — ALBUMIN (HUMAN) 12.5 G: 25 SOLUTION INTRAVENOUS at 09:09

## 2017-09-20 RX ADMIN — ALBUMIN (HUMAN) 25 G: 25 SOLUTION INTRAVENOUS at 09:09

## 2017-09-20 NOTE — PROCEDURES
Radiology Post-Procedure Note    Pre Op Diagnosis: Ascites  Post Op Diagnosis: Same    Procedure: Ultrasound Guided Paracentesis    Procedure performed by: Moe HAYDEN, Afia     Written Informed Consent Obtained: Yes  Specimen Removed: YES clear yellow  Estimated Blood Loss: Minimal    Findings:   Successful paracentesis.  Albumin administered PRN per protocol.    Patient tolerated procedure well.    Afia Rosa, APRN, FNP  Interventional Radiology  [phone] spectralink

## 2017-09-20 NOTE — PROGRESS NOTES
Paracentesis complete. 6000 mLs peritoneal fluid drained. Pt tolerated well. Dressing to clean, dry, and intact. Albumin 25% given 150 mLs.  Discharge instructions and handouts provided. Pt verbalized understanding.

## 2017-09-20 NOTE — DISCHARGE INSTRUCTIONS
For scheduling: Call Mónica at [phone]    For questions or concerns call: DEEPTI MON-FRI 8 AM- 5PM [phone]. Radiology resident on call [phone].    For immediate concerns that are not emergent, you may call our radiology clinic at: [phone]

## 2017-09-20 NOTE — H&P
Radiology History & Physical      SUBJECTIVE:     Chief Complaint: ascites    History of Present Illness:  Fernando Garg is a 66 y.o. male who presents for ultrasound guided paracentesis  Past Medical History:   Diagnosis Date    BPH (benign prostatic hyperplasia)     Cirrhosis of liver with ascites 3/8/2017    Hemochromatosis     Neuropathy      Past Surgical History:   Procedure Laterality Date    COLONOSCOPY  2012    no polyps, [facility] in [facility] yrs[facility]    HERNIA REPAIR      UMBILICAL HERNIA REPAIR  06/14/2017    UPPER GASTROINTESTINAL ENDOSCOPY         Home Meds:   Prior to Admission medications    Medication Sig Start Date End Date Taking? Authorizing Provider   clonazePAM (KLONOPIN) 0.5 MG tablet Take 0.5 mg by mouth 2 (two) times daily as needed for Anxiety.    Historical Provider, MD   furosemide (LASIX) 40 MG tablet Take 1.5 tablets (60 mg total) by mouth once daily. 6/9/17   Nasima Ro NP   gabapentin (NEURONTIN) 300 MG capsule Take 300 mg by mouth 3 (three) times daily.    Historical Provider, MD   GENERLAC 10 gram/15 mL solution  7/8/17   Historical Provider, MD   HYDROmorphone (DILAUDID) 2 MG tablet Take 1 tablet (2 mg total) by mouth every 6 (six) hours as needed (Pain). 6/17/17   Felice Steiner MD   Lactobacillus rhamnosus GG (CULTURELLE) 10 billion cell capsule Take 1 capsule by mouth once daily.    Historical Provider, MD   lactulose (CHRONULAC) 20 gram/30 mL Soln Take 30 mLs (20 g total) by mouth 2 (two) times daily. Goal of 3-4 BM's daily 4/27/17 4/27/18  Nasima Ro NP   omeprazole (PRILOSEC) 20 MG capsule Take 1 capsule (20 mg total) by mouth once daily. 4/24/17 4/24/18  Nasima Ro NP   spironolactone (ALDACTONE) 100 MG tablet Take 2 tablets (200 mg total) by mouth once daily. 6/9/17   Nasima Ro NP     Anticoagulants/Antiplatelets: no anticoagulation    Allergies:   Review of patient's allergies indicates:   Allergen Reactions    Sulfa  (sulfonamide antibiotics) Nausea And Vomiting     Sedation History:  no adverse reactions    Review of Systems:   Hematological: no known coagulopathies  Respiratory: no shortness of breath  Cardiovascular: no chest pain  Gastrointestinal: no abdominal pain  Genito-Urinary: no dysuria  Musculoskeletal: negative  Neurological: no TIA or stroke symptoms         OBJECTIVE:     Vital Signs (Most Recent)  Pulse: 73 (09/20/17 0857)  Resp: 18 (09/20/17 0857)  BP: 118/76 (09/20/17 0857)  SpO2: 100 % (09/20/17 0857)    Physical Exam:  ASA: 2  Mallampati: n/a    General: no acute distress  Mental Status: alert and oriented to person, place and time  HEENT: normocephalic, atraumatic  Chest: unlabored breathing  Heart: regular heart rate  Abdomen: distended  Extremity: moves all extremities    Laboratory  Lab Results   Component Value Date    INR 1.3 (H) 08/18/2017       Lab Results   Component Value Date    WBC 3.41 (L) 08/18/2017    HGB 13.3 (L) 08/18/2017    HCT 36.9 (L) 08/18/2017     (H) 08/18/2017     (L) 08/18/2017      Lab Results   Component Value Date     (H) 08/18/2017     (L) 08/18/2017    K 4.1 08/18/2017     08/18/2017    CO2 23 08/18/2017    BUN 10 08/18/2017    CREATININE 1.0 08/18/2017    CALCIUM 9.1 08/18/2017    ALT 14 08/18/2017    AST 30 08/18/2017    ALBUMIN 3.0 (L) 08/18/2017    BILITOT 2.0 (H) 08/18/2017    BILIDIR 0.9 (H) 04/24/2017       ASSESSMENT/PLAN:     Sedation Plan: local  Patient will undergo ultrasound guided paracentesis.    SHELLY James, FNP  Interventional Radiology  [phone] [employer]

## 2017-10-03 ENCOUNTER — HOSPITAL ENCOUNTER (OUTPATIENT)
Dept: INTERVENTIONAL RADIOLOGY/VASCULAR | Facility: HOSPITAL | Age: 67
Discharge: HOME | End: 2017-10-03
Attending: NURSE PRACTITIONER
Payer: MEDICARE

## 2017-10-03 VITALS
HEART RATE: 67 BPM | RESPIRATION RATE: 18 BRPM | DIASTOLIC BLOOD PRESSURE: 73 MMHG | SYSTOLIC BLOOD PRESSURE: 120 MMHG | OXYGEN SATURATION: 98 %

## 2017-10-03 DIAGNOSIS — R18.8 OTHER ASCITES: ICD-10-CM

## 2017-10-03 LAB
APPEARANCE FLD: NORMAL
BODY FLD TYPE: NORMAL
COLOR FLD: YELLOW
LYMPHOCYTES NFR FLD MANUAL: 58 %
MESOTHL CELL NFR FLD MANUAL: 14 %
MONOS+MACROS NFR FLD MANUAL: 17 %
NEUTROPHILS NFR FLD MANUAL: 11 %
WBC # FLD: 88 /CU MM

## 2017-10-03 PROCEDURE — C1729 CATH, DRAINAGE: HCPCS

## 2017-10-03 PROCEDURE — 89051 BODY FLUID CELL COUNT: CPT

## 2017-10-03 PROCEDURE — P9047 ALBUMIN (HUMAN), 25%, 50ML: HCPCS | Performed by: NURSE PRACTITIONER

## 2017-10-03 PROCEDURE — 63600175 PHARM REV CODE 636 W HCPCS: Performed by: NURSE PRACTITIONER

## 2017-10-03 PROCEDURE — A7048 VACUUM DRAIN BOTTLE/TUBE KIT: HCPCS

## 2017-10-03 PROCEDURE — 49083 ABD PARACENTESIS W/IMAGING: CPT | Mod: GC,,, | Performed by: RADIOLOGY

## 2017-10-03 RX ORDER — ALBUMIN HUMAN 250 G/1000ML
25 SOLUTION INTRAVENOUS
Status: DISCONTINUED | OUTPATIENT
Start: 2017-10-03 | End: 2017-10-04 | Stop reason: HOSPADM

## 2017-10-03 RX ADMIN — ALBUMIN (HUMAN) 25 G: 25 SOLUTION INTRAVENOUS at 11:10

## 2017-10-03 NOTE — PROCEDURES
Radiology Post-Procedure Note    Pre Op Diagnosis: Ascites  Post Op Diagnosis: Same    Procedure: Paracentesis    Procedure performed by: Rachel Garcia MD, Thien Bocanegra MD    Written Informed Consent Obtained: Yes  Specimen Removed: YES 20 cc clear yellow fluid removed and sent to lab for analysis.  Additional fluid removed for patient comfort.  Estimated Blood Loss: Minimal    Findings:   Successful paracentesis.      Patient tolerated procedure well.    Rachel Garcia MD  Resident  Department of Radiology  Pager: [phone]

## 2017-10-03 NOTE — PROGRESS NOTES
Paracentesis complete. 7700 mLs peritoneal fluid drained. Pt tolerated well. Dressing to clean, dry, and intact. Albumin 25% given 200 mLs. Specimens sent per lab order. Discharge instructions and handouts provided. Pt verbalized understanding.

## 2017-10-03 NOTE — DISCHARGE INSTRUCTIONS
For scheduling: Call Mónica at [phone]    For questions or concerns call: DEEPTI MON-FRI 8 AM- 5PM [phone]. Radiology resident on call [phone].    For immediate concerns that are not emergent, you may call our radiology clinic at: [phone]

## 2017-10-03 NOTE — H&P
Radiology History & Physical      SUBJECTIVE:     Chief Complaint: ascites    History of Present Illness:  Fernando Garg is a 66 y.o. male who presents for US guided paracentesis.  Past Medical History:   Diagnosis Date    BPH (benign prostatic hyperplasia)     Cirrhosis of liver with ascites 3/8/2017    Hemochromatosis     Neuropathy      Past Surgical History:   Procedure Laterality Date    COLONOSCOPY  2012    no polyps, [facility] in [facility] yrs[facility]    HERNIA REPAIR      UMBILICAL HERNIA REPAIR  06/14/2017    UPPER GASTROINTESTINAL ENDOSCOPY         Home Meds:   Prior to Admission medications    Medication Sig Start Date End Date Taking? Authorizing Provider   clonazePAM (KLONOPIN) 0.5 MG tablet Take 0.5 mg by mouth 2 (two) times daily as needed for Anxiety.    Historical Provider, MD   furosemide (LASIX) 40 MG tablet Take 1.5 tablets (60 mg total) by mouth once daily. 6/9/17   Nasima Ro NP   gabapentin (NEURONTIN) 300 MG capsule Take 300 mg by mouth 3 (three) times daily.    Historical Provider, MD   GENERLAC 10 gram/15 mL solution  7/8/17   Historical Provider, MD   HYDROmorphone (DILAUDID) 2 MG tablet Take 1 tablet (2 mg total) by mouth every 6 (six) hours as needed (Pain). 6/17/17   Felice Steiner MD   Lactobacillus rhamnosus GG (CULTURELLE) 10 billion cell capsule Take 1 capsule by mouth once daily.    Historical Provider, MD   lactulose (CHRONULAC) 20 gram/30 mL Soln Take 30 mLs (20 g total) by mouth 2 (two) times daily. Goal of 3-4 BM's daily 4/27/17 4/27/18  Nasima Ro NP   omeprazole (PRILOSEC) 20 MG capsule Take 1 capsule (20 mg total) by mouth once daily. 4/24/17 4/24/18  Nasima Ro NP   spironolactone (ALDACTONE) 100 MG tablet Take 2 tablets (200 mg total) by mouth once daily. 6/9/17   Nasima Ro NP     Anticoagulants/Antiplatelets: no anticoagulation    Allergies:   Review of patient's allergies indicates:   Allergen Reactions    Sulfa  (sulfonamide antibiotics) Nausea And Vomiting     Sedation History:  no adverse reactions    Review of Systems:   Hematological: no known coagulopathies  Respiratory: no shortness of breath  Cardiovascular: no chest pain  Gastrointestinal: no abdominal pain  Genito-Urinary: no dysuria  Musculoskeletal: negative  Neurological: no TIA or stroke symptoms         OBJECTIVE:     Vital Signs (Most Recent)  Pulse: 69 (10/03/17 0910)  Resp: 18 (10/03/17 0910)  BP: 129/83 (10/03/17 0910)  SpO2: 98 % (10/03/17 0910)    Physical Exam:  ASA: N/A  Mallampati: N/A    General: no acute distress  Mental Status: alert and oriented to person, place and time  HEENT: normocephalic, atraumatic  Chest: unlabored breathing  Heart: regular heart rate  Abdomen: distended  Extremity: moves all extremities    Laboratory  Lab Results   Component Value Date    INR 1.2 09/20/2017       Lab Results   Component Value Date    WBC 3.71 (L) 09/20/2017    HGB 11.6 (L) 09/20/2017    HCT 32.8 (L) 09/20/2017     (H) 09/20/2017     (L) 09/20/2017      Lab Results   Component Value Date     (H) 09/20/2017     09/20/2017    K 4.4 09/20/2017     09/20/2017    CO2 26 09/20/2017    BUN 9 09/20/2017    CREATININE 0.8 09/20/2017    CALCIUM 8.7 09/20/2017    ALT 14 09/20/2017    AST 29 09/20/2017    ALBUMIN 2.7 (L) 09/20/2017    BILITOT 1.4 (H) 09/20/2017    BILIDIR 0.9 (H) 04/24/2017       ASSESSMENT/PLAN:     Sedation Plan: local anesthetic only  Patient will undergo US guided paracentesis.    Rachel Garcia MD  Resident  Department of Radiology  Pager: [phone]

## 2017-10-10 ENCOUNTER — TELEPHONE (OUTPATIENT)
Dept: HEPATOLOGY | Facility: CLINIC | Age: 67
End: 2017-10-10

## 2017-10-10 ENCOUNTER — HOSPITAL ENCOUNTER (OUTPATIENT)
Dept: RADIOLOGY | Facility: HOSPITAL | Age: 67
Discharge: HOME | End: 2017-10-10
Attending: NURSE PRACTITIONER
Payer: MEDICARE

## 2017-10-10 DIAGNOSIS — R16.0 LIVER MASSES: ICD-10-CM

## 2017-10-10 PROCEDURE — 74183 MRI ABD W/O CNTR FLWD CNTR: CPT | Mod: TC,PO

## 2017-10-10 PROCEDURE — A9577 INJ MULTIHANCE: HCPCS | Mod: PO | Performed by: NURSE PRACTITIONER

## 2017-10-10 PROCEDURE — 74183 MRI ABD W/O CNTR FLWD CNTR: CPT | Mod: 26,,, | Performed by: RADIOLOGY

## 2017-10-10 PROCEDURE — 25500020 PHARM REV CODE 255: Mod: PO | Performed by: NURSE PRACTITIONER

## 2017-10-10 RX ADMIN — GADOBENATE DIMEGLUMINE 14 ML: 529 INJECTION, SOLUTION INTRAVENOUS at 11:10

## 2017-10-10 NOTE — TELEPHONE ENCOUNTER
Patient: Fernando Garg       MRN: [id number]      : 1950     Age: 66 y.o.  [address] [facility] [place] [address]    Provider: DEMI - Nasima Ro NP    Patient Transplant Status: Not a candidate due to no drug coverage    Reason for presentation: Reassessment    Clinical Summary: 65 yo with decompensated cirrhosis likely due to HH and/or alcohol. He is decompensated with ascites. TPCT in 3/2017 revealed 3 small liver lesions. AFP nl 6.9. His MELD was 16 on initial labs so he was referred for transplant evaluation but d/t no drug coverage, he has not been evaluated. He was reviewed in IR conference on 17 and 17. Plan to repeat MRI in 3 months with specific request for subbtraction to be performed. He had repeat MRI on 10/10/17.     Imaging to be reviewed: MRI 10/10/17, MRI 17, TPCT 17    HCC Treatment History: n/a    ABO: A POS    Platelets:   Lab Results   Component Value Date/Time     (L) 2017 08:35 AM     Creatinine:   Lab Results   Component Value Date/Time    CREATININE 0.8 2017 08:35 AM     Bilirubin:   Lab Results   Component Value Date/Time    BILITOT 1.4 (H) 2017 08:35 AM     AFP Last 3 each if available:   Lab Results   Component Value Date/Time    AFP 4.6 2017 08:35 AM    AFP 4.9 2017 02:02 PM    AFP 4.8 2017 08:04 AM       MELD: MELD-Na score: 10 at 2017  8:35 AM  MELD score: 10 at 2017  8:35 AM  Calculated from:  Serum Creatinine: 0.8 mg/dL (Rounded to 1) at 2017  8:35 AM  Serum Sodium: 138 mmol/L (Rounded to 137) at 2017  8:35 AM  Total Bilirubin: 1.4 mg/dL at 2017  8:35 AM  INR(ratio): 1.2 at 2017  8:35 AM  Age: 66 years    Plan:     Follow-up Provider:

## 2017-10-13 NOTE — TELEPHONE ENCOUNTER
Patient: Fernando Garg       MRN: [id number]      : 1950     Age: 66 y.o.  [address] [address] [facility] [place] [address] [facility] [address] [place] [address]    Provider: DEMI - Nasima Ro NP    Patient Transplant Status: Not a candidate due to no drug coverage    Reason for presentation: Reassessment    Clinical Summary: 67 yo with decompensated cirrhosis likely due to HH and/or alcohol. He is decompensated with ascites. TPCT in 3/2017 revealed 3 small liver lesions. AFP nl 6.9. His MELD was 16 on initial labs so he was referred for transplant evaluation but d/t no drug coverage, he has not been evaluated. He was reviewed in IR conference on 17 and 17. Plan to repeat MRI in 3 months with specific request for subbtraction to be performed. He had repeat MRI on 10/10/17.     Imaging to be reviewed: MRI 10/10/17, MRI 17, TPCT 17    HCC Treatment History: n/a    ABO: A POS    Platelets:   Lab Results   Component Value Date/Time     (L) 2017 08:35 AM     Creatinine:   Lab Results   Component Value Date/Time    CREATININE 0.8 2017 08:35 AM     Bilirubin:   Lab Results   Component Value Date/Time    BILITOT 1.4 (H) 2017 08:35 AM     AFP Last 3 each if available:   Lab Results   Component Value Date/Time    AFP 4.6 2017 08:35 AM    AFP 4.9 2017 02:02 PM    AFP 4.8 2017 08:04 AM       MELD: MELD-Na score: 10 at 2017  8:35 AM  MELD score: 10 at 2017  8:35 AM  Calculated from:  Serum Creatinine: 0.8 mg/dL (Rounded to 1) at 2017  8:35 AM  Serum Sodium: 138 mmol/L (Rounded to 137) at 2017  8:35 AM  Total Bilirubin: 1.4 mg/dL at 2017  8:35 AM  INR(ratio): 1.2 at 2017  8:35 AM  Age: 66 years    Plan: 3 indeterminate lesions.  T1 bright on pre-contrast.  Poor quality subtraction images.  tpCT in 3 mos.  Request re-evaluation of financial status.      Orders entered and note forwarded to hepatology staff to coordinate follow-up.  Message sent to Michelle Schwartz,  requesting re-evaluation of insurance coverage.      Follow-up Provider: Nasima Ro NP

## 2017-10-16 ENCOUNTER — HOSPITAL ENCOUNTER (OUTPATIENT)
Dept: INTERVENTIONAL RADIOLOGY/VASCULAR | Facility: HOSPITAL | Age: 67
Discharge: HOME | End: 2017-10-16
Attending: NURSE PRACTITIONER
Payer: MEDICARE

## 2017-10-16 VITALS
SYSTOLIC BLOOD PRESSURE: 106 MMHG | OXYGEN SATURATION: 98 % | HEART RATE: 70 BPM | RESPIRATION RATE: 15 BRPM | DIASTOLIC BLOOD PRESSURE: 72 MMHG

## 2017-10-16 DIAGNOSIS — R18.8 OTHER ASCITES: ICD-10-CM

## 2017-10-16 LAB
APPEARANCE FLD: NORMAL
BODY FLD TYPE: NORMAL
COLOR FLD: YELLOW
LYMPHOCYTES NFR FLD MANUAL: 52 %
MESOTHL CELL NFR FLD MANUAL: 2 %
MONOS+MACROS NFR FLD MANUAL: 42 %
NEUTROPHILS NFR FLD MANUAL: 4 %
WBC # FLD: 96 /CU MM

## 2017-10-16 PROCEDURE — 49083 ABD PARACENTESIS W/IMAGING: CPT | Mod: GC,,, | Performed by: FAMILY MEDICINE

## 2017-10-16 PROCEDURE — 89051 BODY FLUID CELL COUNT: CPT

## 2017-10-16 PROCEDURE — C1729 CATH, DRAINAGE: HCPCS

## 2017-10-16 PROCEDURE — P9047 ALBUMIN (HUMAN), 25%, 50ML: HCPCS | Performed by: NURSE PRACTITIONER

## 2017-10-16 PROCEDURE — 63600175 PHARM REV CODE 636 W HCPCS: Performed by: NURSE PRACTITIONER

## 2017-10-16 RX ORDER — ALBUMIN HUMAN 250 G/1000ML
25 SOLUTION INTRAVENOUS
Status: DISCONTINUED | OUTPATIENT
Start: 2017-10-16 | End: 2017-10-17 | Stop reason: HOSPADM

## 2017-10-16 RX ADMIN — ALBUMIN (HUMAN) 25 G: 25 SOLUTION INTRAVENOUS at 11:10

## 2017-10-16 RX ADMIN — ALBUMIN (HUMAN) 12.5 G: 25 SOLUTION INTRAVENOUS at 12:10

## 2017-10-16 RX ADMIN — ALBUMIN (HUMAN) 12.5 G: 25 SOLUTION INTRAVENOUS at 11:10

## 2017-10-16 NOTE — DISCHARGE INSTRUCTIONS
Amount removed=    Daytime phone= [phone]    24/7 phone= [phone]-ask for radiology resident on call

## 2017-10-16 NOTE — H&P
Radiology History & Physical      SUBJECTIVE:     Chief Complaint: ascites    History of Present Illness:  Fernando Garg is a 66 y.o. male who presents for ultrasound guided paracentesis  Past Medical History:   Diagnosis Date    BPH (benign prostatic hyperplasia)     Cirrhosis of liver with ascites 3/8/2017    Hemochromatosis     Neuropathy      Past Surgical History:   Procedure Laterality Date    COLONOSCOPY  2012    no polyps, [facility] in [facility] yrs[facility]    HERNIA REPAIR      UMBILICAL HERNIA REPAIR  06/14/2017    UPPER GASTROINTESTINAL ENDOSCOPY         Home Meds:   Prior to Admission medications    Medication Sig Start Date End Date Taking? Authorizing Provider   clonazePAM (KLONOPIN) 0.5 MG tablet Take 0.5 mg by mouth 2 (two) times daily as needed for Anxiety.    Historical Provider, MD   furosemide (LASIX) 40 MG tablet Take 1.5 tablets (60 mg total) by mouth once daily. 6/9/17   Nasima Ro NP   gabapentin (NEURONTIN) 300 MG capsule Take 300 mg by mouth 3 (three) times daily.    Historical Provider, MD   GENERLAC 10 gram/15 mL solution  7/8/17   Historical Provider, MD   HYDROmorphone (DILAUDID) 2 MG tablet Take 1 tablet (2 mg total) by mouth every 6 (six) hours as needed (Pain). 6/17/17   Felice Steiner MD   Lactobacillus rhamnosus GG (CULTURELLE) 10 billion cell capsule Take 1 capsule by mouth once daily.    Historical Provider, MD   lactulose (CHRONULAC) 20 gram/30 mL Soln Take 30 mLs (20 g total) by mouth 2 (two) times daily. Goal of 3-4 BM's daily 4/27/17 4/27/18  Nasima Ro NP   omeprazole (PRILOSEC) 20 MG capsule Take 1 capsule (20 mg total) by mouth once daily. 4/24/17 4/24/18  Nasima Ro NP   spironolactone (ALDACTONE) 100 MG tablet Take 2 tablets (200 mg total) by mouth once daily. 6/9/17   Nasima Ro NP     Anticoagulants/Antiplatelets: no anticoagulation    Allergies:   Review of patient's allergies indicates:   Allergen Reactions    Sulfa  (sulfonamide antibiotics) Nausea And Vomiting     Sedation History:  no adverse reactions    Review of Systems:   Hematological: no known coagulopathies  Respiratory: no shortness of breath  Cardiovascular: no chest pain  Gastrointestinal: no abdominal pain  Genito-Urinary: no dysuria  Musculoskeletal: negative  Neurological: no TIA or stroke symptoms         OBJECTIVE:     Vital Signs (Most Recent)       Physical Exam:  ASA: 2  Mallampati: n/a    General: no acute distress  Mental Status: alert and oriented to person, place and time  HEENT: normocephalic, atraumatic  Chest: unlabored breathing  Heart: regular heart rate  Abdomen: distended  Extremity: moves all extremities    Laboratory  Lab Results   Component Value Date    INR 1.2 09/20/2017       Lab Results   Component Value Date    WBC 3.71 (L) 09/20/2017    HGB 11.6 (L) 09/20/2017    HCT 32.8 (L) 09/20/2017     (H) 09/20/2017     (L) 09/20/2017      Lab Results   Component Value Date     (H) 09/20/2017     09/20/2017    K 4.4 09/20/2017     09/20/2017    CO2 26 09/20/2017    BUN 9 09/20/2017    CREATININE 0.8 09/20/2017    CALCIUM 8.7 09/20/2017    ALT 14 09/20/2017    AST 29 09/20/2017    ALBUMIN 2.7 (L) 09/20/2017    BILITOT 1.4 (H) 09/20/2017    BILIDIR 0.9 (H) 04/24/2017       ASSESSMENT/PLAN:     Sedation Plan: local  Patient will undergo ultrasound guided paracentesis.    SHELLY James, FNP  Interventional Radiology  [phone] [employer]

## 2017-10-16 NOTE — PROCEDURES
Radiology Post-Procedure Note    Pre Op Diagnosis: Ascites  Post Op Diagnosis: Same    Procedure: Ultrasound Guided Paracentesis    Procedure performed by: Moe HAYDEN, Afia     Written Informed Consent Obtained: Yes  Specimen Removed: YES clear yellow  Estimated Blood Loss: Minimal    Findings:   Successful paracentesis.  Albumin administered PRN per protocol.    Patient tolerated procedure well.    Afia Rosa, APRN, FNP  Interventional Radiology  [phone] [employer]

## 2017-10-17 ENCOUNTER — OFFICE VISIT (OUTPATIENT)
Dept: HEPATOLOGY | Facility: CLINIC | Age: 67
End: 2017-10-17
Payer: MEDICARE

## 2017-10-17 ENCOUNTER — CONFERENCE (OUTPATIENT)
Dept: TRANSPLANT | Facility: CLINIC | Age: 67
End: 2017-10-17

## 2017-10-17 ENCOUNTER — TELEPHONE (OUTPATIENT)
Dept: TRANSPLANT | Facility: CLINIC | Age: 67
End: 2017-10-17

## 2017-10-17 VITALS
BODY MASS INDEX: 21.2 KG/M2 | RESPIRATION RATE: 18 BRPM | DIASTOLIC BLOOD PRESSURE: 65 MMHG | SYSTOLIC BLOOD PRESSURE: 116 MMHG | HEART RATE: 74 BPM | WEIGHT: 156.5 LBS | OXYGEN SATURATION: 100 % | HEIGHT: 72 IN | TEMPERATURE: 97 F

## 2017-10-17 DIAGNOSIS — R16.0 LIVER MASSES: Primary | ICD-10-CM

## 2017-10-17 DIAGNOSIS — K74.60 CIRRHOSIS OF LIVER WITH ASCITES, UNSPECIFIED HEPATIC CIRRHOSIS TYPE: Primary | ICD-10-CM

## 2017-10-17 DIAGNOSIS — R18.8 OTHER ASCITES: ICD-10-CM

## 2017-10-17 DIAGNOSIS — Z87.898 HISTORY OF ALCOHOL USE: ICD-10-CM

## 2017-10-17 DIAGNOSIS — K74.69 OTHER CIRRHOSIS OF LIVER: ICD-10-CM

## 2017-10-17 DIAGNOSIS — R16.0 LIVER MASSES: ICD-10-CM

## 2017-10-17 DIAGNOSIS — E83.119 HEMOCHROMATOSIS, UNSPECIFIED HEMOCHROMATOSIS TYPE: ICD-10-CM

## 2017-10-17 DIAGNOSIS — D64.9 ANEMIA, UNSPECIFIED TYPE: ICD-10-CM

## 2017-10-17 DIAGNOSIS — R18.8 CIRRHOSIS OF LIVER WITH ASCITES, UNSPECIFIED HEPATIC CIRRHOSIS TYPE: Primary | ICD-10-CM

## 2017-10-17 PROBLEM — Z78.9 ALCOHOL USE: Status: RESOLVED | Noted: 2017-03-28 | Resolved: 2017-10-17

## 2017-10-17 PROBLEM — K70.31 ALCOHOLIC CIRRHOSIS OF LIVER WITH ASCITES: Status: RESOLVED | Noted: 2017-03-08 | Resolved: 2017-10-17

## 2017-10-17 PROBLEM — K42.0 INCARCERATED UMBILICAL HERNIA: Status: RESOLVED | Noted: 2017-06-14 | Resolved: 2017-10-17

## 2017-10-17 PROCEDURE — 99215 OFFICE O/P EST HI 40 MIN: CPT | Mod: S$PBB,,, | Performed by: NURSE PRACTITIONER

## 2017-10-17 PROCEDURE — 99214 OFFICE O/P EST MOD 30 MIN: CPT | Mod: PBBFAC | Performed by: NURSE PRACTITIONER

## 2017-10-17 PROCEDURE — 99999 PR PBB SHADOW E&M-EST. PATIENT-LVL IV: CPT | Mod: PBBFAC,,, | Performed by: NURSE PRACTITIONER

## 2017-10-17 NOTE — TELEPHONE ENCOUNTER
Noted    ----- Message from Michelle Crowder sent at 10/17/2017  1:53 PM CDT -----  Josey Figueroa contacted me to speak with patient and wife in clinic to go over what type of medical coverage is needed to continue with transplant care.  Advised to get medicare plan d and f  Both verbalized understanding and at next appt in 6 weeks, will tell me what new plan was selected for 2018 year

## 2017-10-17 NOTE — PROGRESS NOTES
"[facility] Established Patient Visit    Reason for Visit:  F/u cirrhosis    PCP: Gilo Kawasaki    HPI:  This is a 66 y.o. 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 [facility]. 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 No copies of H63D. His ASMA was (+) at 1:40 and IgG elevated at 2348 but AMA and TUAN negative. He denied drinking any alcohol in the past year but his PETH was (+) at 180 in 3/2017 indicating recent alcohol intake. His MELD was elevated at 16 on initial labs. We initiated clearance for liver transplant evaluation based on this. He has been unable to start evaluation though since he does not have any prescription drug coverage. He can enroll in this in October.     His cirrhosis has been decompensated with ascites. He is currently on lasix 60 mg and spironolactone 200 mg daily. He has also had multiple paracenteses. He is on lactulose for his constipation. Has not had hepatic encephalopathy.     We have also been following his liver masses. TPCT on 3/8 revealed 3 indeterminate liver lesions, all 1 cm or less, all hyperenhancing. Has had MRI's for further surveillance and was reviewed in IR conference in 4/2017, 7/2017, and again today with recs for continued surveillance, indeterminate lesions.     Plan: 3 indeterminate lesions.  T1 bright on pre-contrast.  Poor quality subtraction images.  tpCT in 3 mos.  Request re-evaluation of financial status.       Orders entered and note forwarded to hepatology staff to coordinate follow-up.  Message sent to Michelle Schwartz, requesting re-evaluation of insurance coverage.       [facility] arranging this for [facility] on the [facility]. His last ferritin here was 525 but H/H lower at 11.6/32.8. He is scheduled for another phlebotomy tomorrow. He reports he feels "wiped out" now after undergoing one.     Last labs on 9/20 show lower " albumin at 2.7. PETH negative again. He has needed 3 paracenteses recently, last with 9 liters removed yesterday.     He denies jaundice, dark urine, hematemesis, melena, slowed mentation. Weight is stable.       ROS:   GENERAL: Denies fever, chills, weight change, (+) fatigue  HEENT: Denies headaches, dizziness, vision/hearing changes  CARDIOVASCULAR: Denies chest pain, palpitations, (+) edema  RESPIRATORY: Denies dyspnea, cough  GI: Denies abdominal pain, no rectal bleeding, no nausea, vomiting.   : Denies dysuria, hematuria   SKIN: Denies rash, itching   NEURO: Denies confusion, memory loss, or mood changes  PSYCH: Denies depression or anxiety  HEME/LYMPH: Denies easy bruising or bleeding      PMHX:  has a past medical history of BPH (benign prostatic hyperplasia); Cirrhosis of liver with ascites (3/8/2017); Hemochromatosis; and Neuropathy.    PSHX:  has a past surgical history that includes Hernia repair; Colonoscopy (2012); Upper gastrointestinal endoscopy; and Umbilical hernia repair (06/14/2017).    The patient's social and family histories were reviewed by me and updated in the appropriate section of the electronic medical record.    Review of patient's allergies indicates:   Allergen Reactions    Sulfa (sulfonamide antibiotics) Nausea And Vomiting       Medications reviewed in Epic      Objective Findings:    PHYSICAL EXAM:   Chronically ill appearing friendly White male, in no acute distress; alert and oriented to person, place and time  VITALS: /65 (BP Location: Left arm, Patient Position: Sitting)   Pulse 74   Temp 97.1 °F (36.2 °C) (Oral)   Resp 18   Ht 6' (1.829 m)   Wt 71 kg (156 lb 8.4 oz)   SpO2 100%   BMI 21.23 kg/m²   HENT: Normocephalic, without obvious abnormality. Oral mucosa pink and moist. Dentition fair. (+) temporal and facial muscle wasting.  EYES: Sclerae mildly icteric.    CARDIOVASCULAR: Regular rate and rhythm. No murmurs.  RESPIRATORY: Normal respiratory effort. BBS  CTA. No wheezes or crackles.  GI: Soft, non-tender, mildly distended. (+) hepatosplenomegaly. No masses palpable. No significant ascites.  EXTREMITIES: No clubbing, cyanosis, (+) 1 BLE edema.  SKIN: Warm and dry. No jaundice. (+) dark skin tone. No rashes noted to exposed skin. No telangectasias noted. (+) palmar erythema.  NEURO: Normal gate. No asterixis.   PSYCH: Memory intact. Thought and speech pattern appropriate. Behavior normal. No depression or anxiety noted.       Labs:  Lab Results   Component Value Date    WBC 3.71 (L) 09/20/2017    HGB 11.6 (L) 09/20/2017    HCT 32.8 (L) 09/20/2017     (L) 09/20/2017     09/20/2017    K 4.4 09/20/2017    CREATININE 0.8 09/20/2017    ALT 14 09/20/2017    AST 29 09/20/2017    ALKPHOS 85 09/20/2017    BILITOT 1.4 (H) 09/20/2017    ALBUMIN 2.7 (L) 09/20/2017    INR 1.2 09/20/2017    AFP 4.6 09/20/2017     MRI 10/10/17  A large quantity of fluid is noted within the abdomen suggestive of ascites.    The liver appears small with an irregular contour suggestive of cirrhosis and is of decreased T2 signal as can be seen with multiple processes including heavy metal toxicity and cirrhosis.    The gallbladder wall is questioned to be thickened which can relate to liver disease, cholecystitis either chronic or acute. Please clinically correlate. If further evaluation is necessary ultrasound or HIDA nuclear medicine evaluation could be performed.    A hypodensity is noted at the upper pole of the right kidney of 8 mm without obvious evidence of enhancement suggestive of a cyst.    3  nodules appear to be present along the gallbladder fossa in segment 5, adjacent to the gallbladder one measures 8 mm and the second of 1.5 cm is noted further to the right. The third, further to the right is of 10 mm. These are best seen on sequence 1101 phase II images 54, 57 and 60. No detrimental change is noted when comparison is made with the MRI 4/11/17. These demonstrate increased T1  signal precontrast. No convincing dropout is noted upon out of phase imaging. These are not obvious on T2 weighted imaging.  The smaller 2 are difficult to evaluate on 90 minute imaging period. The largest does not appear to demonstrate contrast retention and 90 minute imaging next      A nodule is noted within the left lobe the liver near the liver tip of 7 mm that appears enhancing as well, likely present and unchanged since 4/11/17 with a second of 7 mm as well appearing in segment 2 near the border of segment 2 and segment 3. This nodule appears stable since at least 7/7/17    The 3 segment 5 lesions in the lesion in the the left lobe on the right appear to be demonstrating some washout, the background T1 positivity precontrast however hinders ideal assessment    Some washout of contrast is suspected involving the 3 segment 5 lesions diffusely but this is somewhat equivocal given the background T1 shortening. Some washout of the lesion on the right within the left lobe is also suspected, this demonstrates no obvious T1 shortening precontrast. The lesion within the left lobe on the left near the tip demonstrates enhancement without significant washout during the early phases of the exam   Impression         1. Multiple enhancing T1 bright hepatic lesions at least 3 in segment 5 demonstrating T1 shortening prior to contrast administration and 2 nodules in the left lobe. None of these nodules appear convincingly detrimentally changed in size when comparison is made with the priors.     Some washout of contrast is suspected with less conspicuous lesions on later T1 postcontrast phases in the 3 segment 5 lesions and the left lobe lesion on the right. The left lobe lesion on the left near the tip is not obvious  T1 bright pre-contrast but does appear to enhance and is without obvious washout     No definitive growth is noted of these lesions, no obvious enhancing capsule is noted in any of these lesions, washout is  somewhat equivocal given background T1 positivity particularly in the segment 5 lesions. Overall these nodules have a intermediate probability of malignancy. Continued longer-term follow up is necessary    2. Decreased liver signal intensity on T2 as can be seen with hemachromatosis    3. Cirrhosis with ascites    4. Apparent gallbladder wall which may relate to liver disease and ascites, if gallbladder pathology is suspected and further evaluation possibly with a HIDA nuclear medicine exam could be performed         ASSESSMENT:  66 y.o. White male with:  1.  Cirrhosis, decompensated with ascites  -- MELD - MELD-Na score: 10 at 9/20/2017  8:35 AM  MELD score: 10 at 9/20/2017  8:35 AM  Calculated from:  Serum Creatinine: 0.8 mg/dL (Rounded to 1) at 9/20/2017  8:35 AM  Serum Sodium: 138 mmol/L (Rounded to 137) at 9/20/2017  8:35 AM  Total Bilirubin: 1.4 mg/dL at 9/20/2017  8:35 AM  INR(ratio): 1.2 at 9/20/2017  8:35 AM  Age: 66 years  -- HCC screening- AFP and abd. U/S - see below  -- (+) Immunity to Hep A and B per labs  -- EGD - 3/16/17 - no varices  2. Hemochromatosis  -- [facility]  -- [facility]  -- HH DNA analysis- heterozygote for C282Y. No copies of H63D  -- last ferritin 525 on 9/20/17 but H/H lower at 11.6/32.8, may need to hold phlebotomy  3. Ascites  -- SAAG > 1.1 c/w portal HTN etiology on diagnostic and therapeutic para  -- s/p multiple jacinda since 3/9/17. On lasix 60 mg and spironolactone 200 mg daily   4. Liver masses  -- TPCT 3/8/17 - 3 indeterminate liver lesions, all 1 cm or less, all hyperenhancing. One with washout on delayed phase.   -- MRI 4/11/17 - 3 indeterminant arterial enhancing liver lesions within hepatic segment V, no washout or pseudocapsule  -- MRI 7/7/17 - lesions are all stable in size and number, possibly may be regenerative nodules. No early arterial enhancing nodules seen  -- MRI 10/10/17 - lesions appear stable  -- reviewed in IR conference  again today - 3 indeterminate lesions, repeat TPCT in 3 months  -- AFP nl   5. H/o alcohol use  --  PETH (+) at initial visit 3/2017 but negative in 8/2017 and 9/2017  6. Hypoalbuminemia  -- recommended increased protein intake with protein shake 1-2 per day  7. Anemia  -- hold phlebotomy for now      EDUCATION:   Signs and symptoms of hepatic decompensation were reviewed, including jaundice, ascites, and slowed mentation due to hepatic encephalopathy. The patient should seek medical attention if any of these things occur.  We discussed the potential for bleeding from esophageal varices with symptoms of hematemesis and melena. The patient should report to the Emergency Department for these symptoms.    We discussed the increased risk of hepatocellular carcinoma due to cirrhosis. Continued screening every six months with ultrasound and AFP is recommended.     No alcohol or raw seafood.  Tylenol/acetaminophen as needed for pain, up to 2000 mg daily    Discussed use of the MELD score and its role in the management and determining the prognosis of cirrhosis. Discussed liver transplant evaluation process.        PLAN:  1. Labs today  2. TPCT in 3 months for liver mass surveillance  3. Continue lactulose 30 cc BID-TID for constipation  4. Continue lasix 60 mg and spironolactone 200 mg daily. May increase diuretics if labs allow since he has required 3 jacinda recently with 9 liters removed yesterday  5. [facility]  6. Start protein shakes, Premier Protein, 1-2 per day  7. Pt needs to obtain Rx drug coverage in order to be able to proceed with liver transplant evaluation. Open enrollment for Medicare is now open so wife will call to get this done and we can start transplant evaluation after this is completed  8. F/u with me in 6 weeks      Thank you for allowing me to participate in the care of Fernando Garg    Nasima Ro, NP-C     Total duration of visit = 45 min, with > 50% spent  counseling

## 2017-10-17 NOTE — TELEPHONE ENCOUNTER
Noted    ----- Message from Michelle Crowder sent at 10/17/2017 11:53 AM CDT -----  Regarding: RE: reassess insurance status  Mariya   Per medicare, patient did not opt yet for RX coverage.  I called patient and left message on recorder to please take care of this.  Michelle    ----- Message -----  From: Mariya Clarke  Sent: 10/17/2017  11:46 AM  To: Michelle Crowder  Subject: reassess insurance status                        Júnior Martinez,     Mr. Garg's case was discussed this morning and the team is asking that you reach out to him to see if he's cleared up his insurance issues.  Please let me know if you would like me to open a new referral and route it to you    Thanks,

## 2017-10-19 DIAGNOSIS — K74.60 CIRRHOSIS OF LIVER WITH ASCITES, UNSPECIFIED HEPATIC CIRRHOSIS TYPE: Primary | ICD-10-CM

## 2017-10-19 DIAGNOSIS — R18.8 OTHER ASCITES: ICD-10-CM

## 2017-10-19 DIAGNOSIS — R16.0 LIVER MASSES: ICD-10-CM

## 2017-10-19 DIAGNOSIS — E83.119 HEMOCHROMATOSIS, UNSPECIFIED HEMOCHROMATOSIS TYPE: ICD-10-CM

## 2017-10-19 DIAGNOSIS — R18.8 CIRRHOSIS OF LIVER WITH ASCITES, UNSPECIFIED HEPATIC CIRRHOSIS TYPE: Primary | ICD-10-CM

## 2017-10-25 ENCOUNTER — TELEPHONE (OUTPATIENT)
Dept: INTERVENTIONAL RADIOLOGY/VASCULAR | Facility: HOSPITAL | Age: 67
End: 2017-10-25

## 2017-10-25 DIAGNOSIS — R18.8 OTHER ASCITES: Primary | ICD-10-CM

## 2017-10-26 ENCOUNTER — TELEPHONE (OUTPATIENT)
Dept: TRANSPLANT | Facility: HOSPITAL | Age: 67
End: 2017-10-26

## 2017-10-26 ENCOUNTER — HOSPITAL ENCOUNTER (OUTPATIENT)
Dept: INTERVENTIONAL RADIOLOGY/VASCULAR | Facility: HOSPITAL | Age: 67
Discharge: HOME | End: 2017-10-26
Attending: INTERNAL MEDICINE
Payer: MEDICARE

## 2017-10-26 VITALS
SYSTOLIC BLOOD PRESSURE: 116 MMHG | HEART RATE: 77 BPM | RESPIRATION RATE: 16 BRPM | DIASTOLIC BLOOD PRESSURE: 72 MMHG | OXYGEN SATURATION: 98 %

## 2017-10-26 DIAGNOSIS — R18.8 OTHER ASCITES: ICD-10-CM

## 2017-10-26 DIAGNOSIS — R18.8 CIRRHOSIS OF LIVER WITH ASCITES, UNSPECIFIED HEPATIC CIRRHOSIS TYPE: ICD-10-CM

## 2017-10-26 DIAGNOSIS — R16.0 LIVER MASSES: ICD-10-CM

## 2017-10-26 DIAGNOSIS — E83.119 HEMOCHROMATOSIS, UNSPECIFIED HEMOCHROMATOSIS TYPE: ICD-10-CM

## 2017-10-26 DIAGNOSIS — K74.60 CIRRHOSIS OF LIVER WITH ASCITES, UNSPECIFIED HEPATIC CIRRHOSIS TYPE: ICD-10-CM

## 2017-10-26 LAB
ALBUMIN FLD-MCNC: 0.8 G/DL
APPEARANCE FLD: CLEAR
BODY FLD TYPE: NORMAL
COLOR FLD: YELLOW
LYMPHOCYTES NFR FLD MANUAL: 34 %
MESOTHL CELL NFR FLD MANUAL: 16 %
MONOS+MACROS NFR FLD MANUAL: 46 %
NEUTROPHILS NFR FLD MANUAL: 4 %
PROT FLD-MCNC: 1.3 G/DL
SPECIMEN SOURCE: NORMAL
SPECIMEN SOURCE: NORMAL
WBC # FLD: 111 /CU MM

## 2017-10-26 PROCEDURE — P9047 ALBUMIN (HUMAN), 25%, 50ML: HCPCS | Performed by: INTERNAL MEDICINE

## 2017-10-26 PROCEDURE — 84157 ASSAY OF PROTEIN OTHER: CPT

## 2017-10-26 PROCEDURE — 82042 OTHER SOURCE ALBUMIN QUAN EA: CPT

## 2017-10-26 PROCEDURE — 49083 ABD PARACENTESIS W/IMAGING: CPT | Mod: ,,, | Performed by: FAMILY MEDICINE

## 2017-10-26 PROCEDURE — 63600175 PHARM REV CODE 636 W HCPCS: Performed by: INTERNAL MEDICINE

## 2017-10-26 PROCEDURE — C1729 CATH, DRAINAGE: HCPCS

## 2017-10-26 PROCEDURE — 89051 BODY FLUID CELL COUNT: CPT

## 2017-10-26 RX ORDER — ALBUMIN HUMAN 250 G/1000ML
25 SOLUTION INTRAVENOUS CONTINUOUS PRN
Status: DISCONTINUED | OUTPATIENT
Start: 2017-10-26 | End: 2017-10-27 | Stop reason: HOSPADM

## 2017-10-26 RX ADMIN — ALBUMIN (HUMAN) 25 G: 25 SOLUTION INTRAVENOUS at 10:10

## 2017-10-26 NOTE — TELEPHONE ENCOUNTER
SW received call from pt's wife regarding questions around supplemental insurance coverage. SW consulted , CARRIE Crowder around assisting the patient and for SW to gain claruification on what was reviewed with the pt. Please see correspondence below for details.     SW contacted pt's wife to gain understanding of her needs at this time. Pt's wife reported pt was able to successfully get Part D coverage but expressed worry that pt will be denied for Part F coverage. Pt's wife reported when she submitted the application, the Medicare representative expressed his opinion that pt would be denied coverage due to pt having cirrhosis. Per pt's wife, this was prior to application being processed through the proper channels. Pt's wife requested advice on what her next steps should be. SW explained to pt's wife that the official status of supplemental coverage cannot be determined until after the application is appropriately processed and pt receives notification of approval or denial. SW also explained that pt cannot be denied coverage due to a pre-existing condition. Pt's wife expressed understanding and in agreement.     SW advised pt's wife to wait for official determination of coverage and then proceed as appropriate. Pt's wife expressed understanding and in agreement. SW provided opportunity for pt's wife to voice questions/concerns. Pt's wife denied any needs at this time. SW remains available for psychosocial, resource, educational, and discharge needs as appropriate.        ----- Message from Michelle Crowder sent at 10/26/2017  1:17 PM CDT -----  Contact: PT.s Wife  On 10/17/17, Josey Figueroa contacted me to speak with patient and wife in clinic to go  over what type of medical coverage is needed to continue with transplant care.  I rushed over to see them and assist.  Advised to get medicare plan d and f  Both verbalized understanding and at next appt in 6 weeks, will tell me what  new plan  was selected for 2018 year    He wants me to pick a plan for them, and I cannot   We did the online tutorial and he has the # to call and get prices and coverage details.    ----- Message -----  From: Emily Jiménez LMSW  Sent: 10/26/2017   1:09 PM  To: Michelle Martinez,    Would you be able to advise this patient on the best options for supplemental insurance?    Thanks,     Colin    ----- Message -----  From: Tez Contreras  Sent: 10/26/2017  12:46 PM  To: [facility]    PT.'S wife Kenia would like more information regarding coverage.  Patient's current medicare supplemental coverage may deny him, would like suggestion on coverage to proceed with the transplant process.     [phone]  Thanks

## 2017-10-26 NOTE — H&P
Radiology History & Physical      SUBJECTIVE:     Chief Complaint: ascites    History of Present Illness:  Fernando Garg is a 66 y.o. male who presents for ultrasound guided paracentesis  Past Medical History:   Diagnosis Date    BPH (benign prostatic hyperplasia)     Cirrhosis of liver with ascites 3/8/2017    Hemochromatosis     Neuropathy      Past Surgical History:   Procedure Laterality Date    COLONOSCOPY  2012    no polyps, [facility] in [facility]rs[facility]    HERNIA REPAIR      UMBILICAL HERNIA REPAIR  06/14/2017    UPPER GASTROINTESTINAL ENDOSCOPY         Home Meds:   Prior to Admission medications    Medication Sig Start Date End Date Taking? Authorizing Provider   clonazePAM (KLONOPIN) 0.5 MG tablet Take 0.5 mg by mouth 2 (two) times daily as needed for Anxiety.    Historical Provider, MD   furosemide (LASIX) 40 MG tablet Take 1.5 tablets (60 mg total) by mouth once daily. 6/9/17   Nasima Ro NP   gabapentin (NEURONTIN) 300 MG capsule Take 300 mg by mouth 3 (three) times daily.    Historical Provider, MD   HYDROmorphone (DILAUDID) 2 MG tablet Take 1 tablet (2 mg total) by mouth every 6 (six) hours as needed (Pain). 6/17/17   Felice Steiner MD   Lactobacillus rhamnosus GG (CULTURELLE) 10 billion cell capsule Take 1 capsule by mouth once daily.    Historical Provider, MD   lactulose (CHRONULAC) 20 gram/30 mL Soln Take 30 mLs (20 g total) by mouth 2 (two) times daily. Goal of 3-4 BM's daily 4/27/17 4/27/18  Nasima Ro NP   omeprazole (PRILOSEC) 20 MG capsule Take 1 capsule (20 mg total) by mouth once daily. 4/24/17 4/24/18  Nasima Ro NP   spironolactone (ALDACTONE) 100 MG tablet Take 2 tablets (200 mg total) by mouth once daily. 6/9/17   Nasima Ro NP     Anticoagulants/Antiplatelets: no anticoagulation    Allergies:   Review of patient's allergies indicates:   Allergen Reactions    Sulfa (sulfonamide antibiotics) Nausea And Vomiting     Sedation History:  no  adverse reactions    Review of Systems:   Hematological: no known coagulopathies  Respiratory: no shortness of breath  Cardiovascular: no chest pain  Gastrointestinal: no abdominal pain  Genito-Urinary: no dysuria  Musculoskeletal: negative  Neurological: no TIA or stroke symptoms         OBJECTIVE:     Vital Signs (Most Recent)       Physical Exam:  ASA: 2  Mallampati: n/a    General: no acute distress  Mental Status: alert and oriented to person, place and time  HEENT: normocephalic, atraumatic  Chest: unlabored breathing  Heart: regular heart rate  Abdomen: distended  Extremity: moves all extremities    Laboratory  Lab Results   Component Value Date    INR 1.2 10/26/2017       Lab Results   Component Value Date    WBC 4.59 10/26/2017    HGB 12.1 (L) 10/26/2017    HCT 35.5 (L) 10/26/2017     (H) 10/26/2017     (L) 10/26/2017      Lab Results   Component Value Date     (H) 09/20/2017     09/20/2017    K 4.4 09/20/2017     09/20/2017    CO2 26 09/20/2017    BUN 9 09/20/2017    CREATININE 0.8 09/20/2017    CALCIUM 8.7 09/20/2017    ALT 14 09/20/2017    AST 29 09/20/2017    ALBUMIN 2.7 (L) 09/20/2017    BILITOT 1.4 (H) 09/20/2017    BILIDIR 0.9 (H) 04/24/2017       ASSESSMENT/PLAN:     Sedation Plan: local  Patient will undergo ultrasound guided paracentesis.    SHELLY James, FNP  Interventional Radiology  [phone] spectralink

## 2017-10-26 NOTE — DISCHARGE INSTRUCTIONS
For scheduling: Call Mónica at [phone]    For questions or concerns call: DEEPTI MON-FRI 8 AM- 5PM [phone]. Radiology resident on call [phone].    For immediate concerns that are not emergent, you may call our radiology clinic at: [phone]

## 2017-10-26 NOTE — PROGRESS NOTES
Paracentesis complete, 7200 MLs removed. Specimen sent to lab. 200mls 25%  Albumin administered per protocol. Dressing applied to RLQ puncture site, dressing clean dry and intact. Pt given discharge instructions and handouts, pt verbalizes understanding. Questions answered. Pt denies pain and discomfort. Pt refusing transport. Pt ambulated to lobby for transport home with family. Gait steady.

## 2017-10-26 NOTE — PROCEDURES
Radiology Post-Procedure Note    Pre Op Diagnosis: Ascites  Post Op Diagnosis: Same    Procedure: Ultrasound Guided Paracentesis    Procedure performed by: Moe HAYDEN, Afia     Written Informed Consent Obtained: Yes  Specimen Removed: YES clear yellow  Estimated Blood Loss: Minimal    Findings:   Successful paracentesis.  Albumin administered PRN per protocol.    Patient tolerated procedure well.    Afia Rosa, APRN, FNP  Interventional Radiology  [phone] [employer]

## 2017-10-27 ENCOUNTER — TELEPHONE (OUTPATIENT)
Dept: HEPATOLOGY | Facility: CLINIC | Age: 67
End: 2017-10-27

## 2017-10-27 NOTE — TELEPHONE ENCOUNTER
----- Message from Jhonny Vera sent at 10/27/2017  1:49 PM CDT -----  Contact: patient wife  Patient wife calling because the patient was approve of medicare part D but it don't take affect until January 1, 2018      Please call [phone]        Thanks!!!

## 2017-10-29 ENCOUNTER — TELEPHONE (OUTPATIENT)
Dept: HEPATOLOGY | Facility: CLINIC | Age: 67
End: 2017-10-29

## 2017-10-29 NOTE — TELEPHONE ENCOUNTER
Pt did not do labs at last visit with me for some reason. Needs to do labs now for CMP, INR, ferritin. Please call pt to schedule these.

## 2017-11-01 ENCOUNTER — TELEPHONE (OUTPATIENT)
Dept: HEPATOLOGY | Facility: CLINIC | Age: 67
End: 2017-11-01

## 2017-11-03 ENCOUNTER — TELEPHONE (OUTPATIENT)
Dept: HEPATOLOGY | Facility: CLINIC | Age: 67
End: 2017-11-03

## 2017-11-03 NOTE — TELEPHONE ENCOUNTER
MA made 2nd attempt to contact pt. No answer. Left him a message letting him know that I scheduled labs for Monday 11/6 same day as paracentsis. Left a call back number in case he had questions. EMS

## 2017-11-06 ENCOUNTER — HOSPITAL ENCOUNTER (OUTPATIENT)
Dept: INTERVENTIONAL RADIOLOGY/VASCULAR | Facility: HOSPITAL | Age: 67
Discharge: HOME | End: 2017-11-06
Attending: INTERNAL MEDICINE
Payer: MEDICARE

## 2017-11-06 VITALS
OXYGEN SATURATION: 100 % | RESPIRATION RATE: 16 BRPM | SYSTOLIC BLOOD PRESSURE: 120 MMHG | DIASTOLIC BLOOD PRESSURE: 77 MMHG | HEART RATE: 73 BPM

## 2017-11-06 DIAGNOSIS — R18.8 CIRRHOSIS OF LIVER WITH ASCITES, UNSPECIFIED HEPATIC CIRRHOSIS TYPE: ICD-10-CM

## 2017-11-06 DIAGNOSIS — R18.8 CIRRHOSIS OF LIVER WITH ASCITES, UNSPECIFIED HEPATIC CIRRHOSIS TYPE: Primary | ICD-10-CM

## 2017-11-06 DIAGNOSIS — K76.9 LIVER LESION: ICD-10-CM

## 2017-11-06 DIAGNOSIS — E83.119 HEMOCHROMATOSIS, UNSPECIFIED HEMOCHROMATOSIS TYPE: ICD-10-CM

## 2017-11-06 DIAGNOSIS — R18.8 OTHER ASCITES: ICD-10-CM

## 2017-11-06 DIAGNOSIS — K74.60 CIRRHOSIS OF LIVER WITH ASCITES, UNSPECIFIED HEPATIC CIRRHOSIS TYPE: ICD-10-CM

## 2017-11-06 DIAGNOSIS — K74.60 CIRRHOSIS OF LIVER WITH ASCITES, UNSPECIFIED HEPATIC CIRRHOSIS TYPE: Primary | ICD-10-CM

## 2017-11-06 DIAGNOSIS — R16.0 LIVER MASSES: ICD-10-CM

## 2017-11-06 LAB
ALBUMIN FLD-MCNC: 0.7 G/DL
APPEARANCE FLD: CLEAR
BODY FLD TYPE: NORMAL
COLOR FLD: YELLOW
LYMPHOCYTES NFR FLD MANUAL: 55 %
MONOS+MACROS NFR FLD MANUAL: 33 %
NEUTROPHILS NFR FLD MANUAL: 12 %
PROT FLD-MCNC: 1.3 G/DL
SPECIMEN SOURCE: NORMAL
SPECIMEN SOURCE: NORMAL
WBC # FLD: 70 /CU MM

## 2017-11-06 PROCEDURE — P9047 ALBUMIN (HUMAN), 25%, 50ML: HCPCS | Performed by: INTERNAL MEDICINE

## 2017-11-06 PROCEDURE — 82042 OTHER SOURCE ALBUMIN QUAN EA: CPT

## 2017-11-06 PROCEDURE — 89051 BODY FLUID CELL COUNT: CPT

## 2017-11-06 PROCEDURE — 49083 ABD PARACENTESIS W/IMAGING: CPT | Mod: ,,, | Performed by: FAMILY MEDICINE

## 2017-11-06 PROCEDURE — C1729 CATH, DRAINAGE: HCPCS

## 2017-11-06 PROCEDURE — 84157 ASSAY OF PROTEIN OTHER: CPT

## 2017-11-06 PROCEDURE — 63600175 PHARM REV CODE 636 W HCPCS: Performed by: INTERNAL MEDICINE

## 2017-11-06 PROCEDURE — A7048 VACUUM DRAIN BOTTLE/TUBE KIT: HCPCS

## 2017-11-06 RX ORDER — ALBUMIN HUMAN 250 G/1000ML
50 SOLUTION INTRAVENOUS ONCE
Status: COMPLETED | OUTPATIENT
Start: 2017-11-06 | End: 2017-11-06

## 2017-11-06 RX ADMIN — ALBUMIN (HUMAN) 50 G: 25 SOLUTION INTRAVENOUS at 09:11

## 2017-11-06 NOTE — PROGRESS NOTES
Paracentesis complete.[id number] peritoneal fluid drained. Pt tolerated well. Dressing to left lower abdomen clean, dry, and intact. Albumin 25% given 200 mLs. Specimens sent per lab order. Pt discharged.

## 2017-11-06 NOTE — PROCEDURES
Radiology Post-Procedure Note    Pre Op Diagnosis: Ascites  Post Op Diagnosis: Same    Procedure: Ultrasound Guided Paracentesis    Procedure performed by: Moe HAYDEN, Afia     Written Informed Consent Obtained: Yes  Specimen Removed: YES clear yellow  Estimated Blood Loss: Minimal    Findings:   Successful paracentesis.  Albumin administered PRN per protocol.    Patient tolerated procedure well.    Afia Rosa, APRN, FNP  Interventional Radiology  [phone] [employer]

## 2017-11-06 NOTE — H&P
Radiology History & Physical      SUBJECTIVE:     Chief Complaint: ascites    History of Present Illness:  Fernando Garg is a 66 y.o. male who presents for ultrasound guided paracentesis  Past Medical History:   Diagnosis Date    BPH (benign prostatic hyperplasia)     Cirrhosis of liver with ascites 3/8/2017    Hemochromatosis     Neuropathy      Past Surgical History:   Procedure Laterality Date    COLONOSCOPY  2012    no polyps, [facility]    HERNIA REPAIR      UMBILICAL HERNIA REPAIR  06/14/2017    UPPER GASTROINTESTINAL ENDOSCOPY         Home Meds:   Prior to Admission medications    Medication Sig Start Date End Date Taking? Authorizing Provider   clonazePAM (KLONOPIN) 0.5 MG tablet Take 0.5 mg by mouth 2 (two) times daily as needed for Anxiety.    Historical Provider, MD   furosemide (LASIX) 40 MG tablet Take 1.5 tablets (60 mg total) by mouth once daily. 6/9/17   Nasima Ro NP   gabapentin (NEURONTIN) 300 MG capsule Take 300 mg by mouth 3 (three) times daily.    Historical Provider, MD   HYDROmorphone (DILAUDID) 2 MG tablet Take 1 tablet (2 mg total) by mouth every 6 (six) hours as needed (Pain). 6/17/17   Felice Steiner MD   Lactobacillus rhamnosus GG (CULTURELLE) 10 billion cell capsule Take 1 capsule by mouth once daily.    Historical Provider, MD   lactulose (CHRONULAC) 20 gram/30 mL Soln Take 30 mLs (20 g total) by mouth 2 (two) times daily. Goal of 3-4 BM's daily 4/27/17 4/27/18  Nasima Ro NP   omeprazole (PRILOSEC) 20 MG capsule Take 1 capsule (20 mg total) by mouth once daily. 4/24/17 4/24/18  Nasima Ro NP   spironolactone (ALDACTONE) 100 MG tablet Take 2 tablets (200 mg total) by mouth once daily. 6/9/17   Nasima Ro NP     Anticoagulants/Antiplatelets: no anticoagulation    Allergies:   Review of patient's allergies indicates:   Allergen Reactions    Sulfa (sulfonamide antibiotics) Nausea And Vomiting     Sedation History:  no  adverse reactions    Review of Systems:   Hematological: no known coagulopathies  Respiratory: no shortness of breath  Cardiovascular: no chest pain  Gastrointestinal: no abdominal pain  Genito-Urinary: no dysuria  Musculoskeletal: negative  Neurological: no TIA or stroke symptoms         OBJECTIVE:     Vital Signs (Most Recent)  Pulse: 86 (11/06/17 0700)  Resp: 16 (11/06/17 0700)  BP: (!) 146/92 (11/06/17 0700)  SpO2: 100 % (11/06/17 0700)    Physical Exam:  ASA: 2  Mallampati: n/a    General: no acute distress  Mental Status: alert and oriented to person, place and time  HEENT: normocephalic, atraumatic  Chest: unlabored breathing  Heart: regular heart rate  Abdomen: distended  Extremity: moves all extremities    Laboratory  Lab Results   Component Value Date    INR 1.2 10/26/2017       Lab Results   Component Value Date    WBC 4.59 10/26/2017    HGB 12.1 (L) 10/26/2017    HCT 35.5 (L) 10/26/2017     (H) 10/26/2017     (L) 10/26/2017      Lab Results   Component Value Date     (H) 09/20/2017     09/20/2017    K 4.4 09/20/2017     09/20/2017    CO2 26 09/20/2017    BUN 9 09/20/2017    CREATININE 0.8 09/20/2017    CALCIUM 8.7 09/20/2017    ALT 14 09/20/2017    AST 29 09/20/2017    ALBUMIN 2.7 (L) 09/20/2017    BILITOT 1.4 (H) 09/20/2017    BILIDIR 0.9 (H) 04/24/2017       ASSESSMENT/PLAN:     Sedation Plan: local  Patient will undergo ultrasound guided paracentesis.    SHELLY James, FNP  Interventional Radiology  [phone] spectral[employer]

## 2017-11-07 ENCOUNTER — TELEPHONE (OUTPATIENT)
Dept: HEPATOLOGY | Facility: CLINIC | Age: 67
End: 2017-11-07

## 2017-11-15 ENCOUNTER — HOSPITAL ENCOUNTER (OUTPATIENT)
Dept: INTERVENTIONAL RADIOLOGY/VASCULAR | Facility: HOSPITAL | Age: 67
Discharge: HOME | End: 2017-11-15
Attending: INTERNAL MEDICINE
Payer: MEDICARE

## 2017-11-15 VITALS
DIASTOLIC BLOOD PRESSURE: 71 MMHG | SYSTOLIC BLOOD PRESSURE: 114 MMHG | OXYGEN SATURATION: 100 % | HEART RATE: 67 BPM | RESPIRATION RATE: 19 BRPM

## 2017-11-15 DIAGNOSIS — K74.60 CIRRHOSIS OF LIVER WITH ASCITES, UNSPECIFIED HEPATIC CIRRHOSIS TYPE: ICD-10-CM

## 2017-11-15 DIAGNOSIS — R18.8 OTHER ASCITES: ICD-10-CM

## 2017-11-15 DIAGNOSIS — E83.119 HEMOCHROMATOSIS, UNSPECIFIED HEMOCHROMATOSIS TYPE: ICD-10-CM

## 2017-11-15 DIAGNOSIS — R16.0 LIVER MASSES: ICD-10-CM

## 2017-11-15 DIAGNOSIS — R18.8 CIRRHOSIS OF LIVER WITH ASCITES, UNSPECIFIED HEPATIC CIRRHOSIS TYPE: ICD-10-CM

## 2017-11-15 LAB
ALBUMIN FLD-MCNC: 0.8 G/DL
APPEARANCE FLD: CLEAR
BODY FLD TYPE: NORMAL
COLOR FLD: YELLOW
LYMPHOCYTES NFR FLD MANUAL: 70 %
MESOTHL CELL NFR FLD MANUAL: 2 %
MONOS+MACROS NFR FLD MANUAL: 26 %
NEUTROPHILS NFR FLD MANUAL: 2 %
PROT FLD-MCNC: 1.4 G/DL
SPECIMEN SOURCE: NORMAL
SPECIMEN SOURCE: NORMAL
WBC # FLD: 102 /CU MM

## 2017-11-15 PROCEDURE — P9047 ALBUMIN (HUMAN), 25%, 50ML: HCPCS | Performed by: INTERNAL MEDICINE

## 2017-11-15 PROCEDURE — 84157 ASSAY OF PROTEIN OTHER: CPT

## 2017-11-15 PROCEDURE — C1729 CATH, DRAINAGE: HCPCS

## 2017-11-15 PROCEDURE — 63600175 PHARM REV CODE 636 W HCPCS: Performed by: INTERNAL MEDICINE

## 2017-11-15 PROCEDURE — 49083 ABD PARACENTESIS W/IMAGING: CPT | Mod: ,,, | Performed by: FAMILY MEDICINE

## 2017-11-15 PROCEDURE — 89051 BODY FLUID CELL COUNT: CPT

## 2017-11-15 PROCEDURE — 82042 OTHER SOURCE ALBUMIN QUAN EA: CPT

## 2017-11-15 RX ORDER — ALBUMIN HUMAN 250 G/1000ML
25 SOLUTION INTRAVENOUS CONTINUOUS PRN
Status: DISCONTINUED | OUTPATIENT
Start: 2017-11-15 | End: 2017-11-16 | Stop reason: HOSPADM

## 2017-11-15 RX ADMIN — ALBUMIN (HUMAN) 25 G: 25 SOLUTION INTRAVENOUS at 01:11

## 2017-11-15 NOTE — H&P
Radiology History & Physical      SUBJECTIVE:     Chief Complaint: ascites    History of Present Illness:  Fernando Garg is a 66 y.o. male who presents for ultrasound guided paracentesis  Past Medical History:   Diagnosis Date    BPH (benign prostatic hyperplasia)     Cirrhosis of liver with ascites 3/8/2017    Hemochromatosis     Neuropathy      Past Surgical History:   Procedure Laterality Date    COLONOSCOPY  2012    no polyps, [facility] in [facility]    HERNIA REPAIR      UMBILICAL HERNIA REPAIR  06/14/2017    UPPER GASTROINTESTINAL ENDOSCOPY         Home Meds:   Prior to Admission medications    Medication Sig Start Date End Date Taking? Authorizing Provider   clonazePAM (KLONOPIN) 0.5 MG tablet Take 0.5 mg by mouth 2 (two) times daily as needed for Anxiety.    Historical Provider, MD   furosemide (LASIX) 40 MG tablet Take 1.5 tablets (60 mg total) by mouth once daily. 6/9/17   Nasima Ro NP   gabapentin (NEURONTIN) 300 MG capsule Take 300 mg by mouth 3 (three) times daily.    Historical Provider, MD   HYDROmorphone (DILAUDID) 2 MG tablet Take 1 tablet (2 mg total) by mouth every 6 (six) hours as needed (Pain). 6/17/17   Felice Steiner MD   Lactobacillus rhamnosus GG (CULTURELLE) 10 billion cell capsule Take 1 capsule by mouth once daily.    Historical Provider, MD   lactulose (CHRONULAC) 20 gram/30 mL Soln Take 30 mLs (20 g total) by mouth 2 (two) times daily. Goal of 3-4 BM's daily 4/27/17 4/27/18  Nasima Ro NP   omeprazole (PRILOSEC) 20 MG capsule Take 1 capsule (20 mg total) by mouth once daily. 4/24/17 4/24/18  Nasima Ro NP   spironolactone (ALDACTONE) 100 MG tablet Take 2 tablets (200 mg total) by mouth once daily. 6/9/17   Nasima Ro NP     Anticoagulants/Antiplatelets: no anticoagulation    Allergies:   Review of patient's allergies indicates:   Allergen Reactions    Sulfa (sulfonamide antibiotics) Nausea And Vomiting     Sedation History:  no  adverse reactions    Review of Systems:   Hematological: no known coagulopathies  Respiratory: no shortness of breath  Cardiovascular: no chest pain  Gastrointestinal: no abdominal pain  Genito-Urinary: no dysuria  Musculoskeletal: negative  Neurological: no TIA or stroke symptoms         OBJECTIVE:     Vital Signs (Most Recent)  Pulse: 63 (11/15/17 1214)  Resp: 19 (11/15/17 1214)  BP: 124/74 (11/15/17 1214)  SpO2: 99 % (11/15/17 1214)    Physical Exam:  ASA: 2  Mallampati: n/a    General: no acute distress  Mental Status: alert and oriented to person, place and time  HEENT: normocephalic, atraumatic  Chest: unlabored breathing  Heart: regular heart rate  Abdomen: distended  Extremity: moves all extremities    Laboratory  Lab Results   Component Value Date    INR 1.2 11/06/2017       Lab Results   Component Value Date    WBC 4.59 10/26/2017    HGB 12.1 (L) 10/26/2017    HCT 35.5 (L) 10/26/2017     (H) 10/26/2017     (L) 10/26/2017      Lab Results   Component Value Date     11/06/2017     11/06/2017    K 4.6 11/06/2017     11/06/2017    CO2 26 11/06/2017    BUN 10 11/06/2017    CREATININE 1.0 11/06/2017    CALCIUM 9.1 11/06/2017    ALT 14 11/06/2017    AST 32 11/06/2017    ALBUMIN 3.0 (L) 11/06/2017    BILITOT 1.2 (H) 11/06/2017    BILIDIR 0.9 (H) 04/24/2017       ASSESSMENT/PLAN:     Sedation Plan: local  Patient will undergo ultrasound guided paracentesis.    SHELLY James, FNP  Interventional Radiology  [phone] spectral[employer]

## 2017-11-15 NOTE — PROGRESS NOTES
Paracentesis complete. 5500 mLs peritoneal fluid drained. Pt tolerated well. Dressing to RLQ clean, dry, and intact. Albumin 25% given 100 mLs. Specimens sent per lab order. Patient ambulated out to [facility].

## 2017-11-15 NOTE — DISCHARGE INSTRUCTIONS
[facility] [phone] (MON-FRI 8 AM- 5PM). Radiology Resident on call [phone].

## 2017-11-15 NOTE — PROCEDURES
Radiology Post-Procedure Note    Pre Op Diagnosis: Ascites  Post Op Diagnosis: Same    Procedure: Ultrasound Guided Paracentesis    Procedure performed by: Moe HAYDEN, Afia     Written Informed Consent Obtained: Yes  Specimen Removed: YES dark yellow  Estimated Blood Loss: Minimal    Findings:   Successful paracentesis.  Albumin administered PRN per protocol.    Patient tolerated procedure well.    Afia Rsoa, APRN, FNP  Interventional Radiology  [phone] spectral[employer]

## 2017-11-27 ENCOUNTER — LAB VISIT (OUTPATIENT)
Dept: LAB | Facility: HOSPITAL | Age: 67
End: 2017-11-27
Attending: NURSE PRACTITIONER
Payer: MEDICARE

## 2017-11-27 DIAGNOSIS — R18.8 CIRRHOSIS OF LIVER WITH ASCITES, UNSPECIFIED HEPATIC CIRRHOSIS TYPE: ICD-10-CM

## 2017-11-27 DIAGNOSIS — K76.9 LIVER LESION: ICD-10-CM

## 2017-11-27 DIAGNOSIS — E83.119 HEMOCHROMATOSIS, UNSPECIFIED HEMOCHROMATOSIS TYPE: ICD-10-CM

## 2017-11-27 DIAGNOSIS — K74.60 CIRRHOSIS OF LIVER WITH ASCITES, UNSPECIFIED HEPATIC CIRRHOSIS TYPE: ICD-10-CM

## 2017-11-27 DIAGNOSIS — R18.8 OTHER ASCITES: ICD-10-CM

## 2017-11-27 LAB
ALBUMIN SERPL BCP-MCNC: 3.1 G/DL
ALP SERPL-CCNC: 90 U/L
ALT SERPL W/O P-5'-P-CCNC: 17 U/L
ANION GAP SERPL CALC-SCNC: 7 MMOL/L
AST SERPL-CCNC: 39 U/L
BASOPHILS # BLD AUTO: 0.06 K/UL
BASOPHILS NFR BLD: 1.2 %
BILIRUB SERPL-MCNC: 1.2 MG/DL
BUN SERPL-MCNC: 10 MG/DL
CALCIUM SERPL-MCNC: 9.3 MG/DL
CHLORIDE SERPL-SCNC: 106 MMOL/L
CO2 SERPL-SCNC: 26 MMOL/L
CREAT SERPL-MCNC: 0.8 MG/DL
DIFFERENTIAL METHOD: ABNORMAL
EOSINOPHIL # BLD AUTO: 0.2 K/UL
EOSINOPHIL NFR BLD: 4.8 %
ERYTHROCYTE [DISTWIDTH] IN BLOOD BY AUTOMATED COUNT: 13.5 %
EST. GFR  (AFRICAN AMERICAN): >60 ML/MIN/1.73 M^2
EST. GFR  (NON AFRICAN AMERICAN): >60 ML/MIN/1.73 M^2
FERRITIN SERPL-MCNC: 356 NG/ML
GLUCOSE SERPL-MCNC: 107 MG/DL
HCT VFR BLD AUTO: 36.7 %
HGB BLD-MCNC: 12.7 G/DL
IMM GRANULOCYTES # BLD AUTO: 0.02 K/UL
IMM GRANULOCYTES NFR BLD AUTO: 0.4 %
INR PPP: 1.2
LYMPHOCYTES # BLD AUTO: 1.2 K/UL
LYMPHOCYTES NFR BLD: 22.8 %
MCH RBC QN AUTO: 35.1 PG
MCHC RBC AUTO-ENTMCNC: 34.6 G/DL
MCV RBC AUTO: 101 FL
MONOCYTES # BLD AUTO: 0.7 K/UL
MONOCYTES NFR BLD: 13.7 %
NEUTROPHILS # BLD AUTO: 2.9 K/UL
NEUTROPHILS NFR BLD: 57.1 %
NRBC BLD-RTO: 0 /100 WBC
PLATELET # BLD AUTO: 128 K/UL
PMV BLD AUTO: 9.4 FL
POTASSIUM SERPL-SCNC: 4.4 MMOL/L
PROT SERPL-MCNC: 7.2 G/DL
PROTHROMBIN TIME: 12.1 SEC
RBC # BLD AUTO: 3.62 M/UL
SODIUM SERPL-SCNC: 139 MMOL/L
WBC # BLD AUTO: 5.04 K/UL

## 2017-11-27 PROCEDURE — 85025 COMPLETE CBC W/AUTO DIFF WBC: CPT

## 2017-11-27 PROCEDURE — 82728 ASSAY OF FERRITIN: CPT

## 2017-11-27 PROCEDURE — 80053 COMPREHEN METABOLIC PANEL: CPT

## 2017-11-27 PROCEDURE — 85610 PROTHROMBIN TIME: CPT | Mod: PO

## 2017-11-27 PROCEDURE — 36415 COLL VENOUS BLD VENIPUNCTURE: CPT | Mod: PO

## 2017-11-28 ENCOUNTER — HOSPITAL ENCOUNTER (OUTPATIENT)
Dept: INTERVENTIONAL RADIOLOGY/VASCULAR | Facility: HOSPITAL | Age: 67
Discharge: HOME | End: 2017-11-28
Attending: INTERNAL MEDICINE
Payer: MEDICARE

## 2017-11-28 VITALS
SYSTOLIC BLOOD PRESSURE: 107 MMHG | RESPIRATION RATE: 18 BRPM | HEART RATE: 76 BPM | OXYGEN SATURATION: 100 % | DIASTOLIC BLOOD PRESSURE: 60 MMHG

## 2017-11-28 DIAGNOSIS — R18.8 CIRRHOSIS OF LIVER WITH ASCITES, UNSPECIFIED HEPATIC CIRRHOSIS TYPE: ICD-10-CM

## 2017-11-28 DIAGNOSIS — R16.0 LIVER MASSES: ICD-10-CM

## 2017-11-28 DIAGNOSIS — E83.119 HEMOCHROMATOSIS, UNSPECIFIED HEMOCHROMATOSIS TYPE: ICD-10-CM

## 2017-11-28 DIAGNOSIS — R18.8 OTHER ASCITES: ICD-10-CM

## 2017-11-28 DIAGNOSIS — K74.60 CIRRHOSIS OF LIVER WITH ASCITES, UNSPECIFIED HEPATIC CIRRHOSIS TYPE: ICD-10-CM

## 2017-11-28 LAB
ALBUMIN FLD-MCNC: 0.8 G/DL
APPEARANCE FLD: NORMAL
BODY FLD TYPE: NORMAL
COLOR FLD: YELLOW
LYMPHOCYTES NFR FLD MANUAL: 78 %
MONOS+MACROS NFR FLD MANUAL: 18 %
NEUTROPHILS NFR FLD MANUAL: 4 %
PROT FLD-MCNC: 1.3 G/DL
SPECIMEN SOURCE: NORMAL
SPECIMEN SOURCE: NORMAL
WBC # FLD: 70 /CU MM

## 2017-11-28 PROCEDURE — A7048 VACUUM DRAIN BOTTLE/TUBE KIT: HCPCS

## 2017-11-28 PROCEDURE — 89051 BODY FLUID CELL COUNT: CPT

## 2017-11-28 PROCEDURE — 49083 ABD PARACENTESIS W/IMAGING: CPT | Mod: GC,,, | Performed by: FAMILY MEDICINE

## 2017-11-28 PROCEDURE — 63600175 PHARM REV CODE 636 W HCPCS: Performed by: INTERNAL MEDICINE

## 2017-11-28 PROCEDURE — C1729 CATH, DRAINAGE: HCPCS

## 2017-11-28 PROCEDURE — 82042 OTHER SOURCE ALBUMIN QUAN EA: CPT

## 2017-11-28 PROCEDURE — P9047 ALBUMIN (HUMAN), 25%, 50ML: HCPCS | Performed by: INTERNAL MEDICINE

## 2017-11-28 PROCEDURE — 84157 ASSAY OF PROTEIN OTHER: CPT

## 2017-11-28 RX ORDER — ALBUMIN HUMAN 250 G/1000ML
37.5 SOLUTION INTRAVENOUS ONCE
Status: COMPLETED | OUTPATIENT
Start: 2017-11-28 | End: 2017-11-28

## 2017-11-28 RX ADMIN — ALBUMIN (HUMAN) 37.5 G: 25 SOLUTION INTRAVENOUS at 02:11

## 2017-11-28 NOTE — DISCHARGE INSTRUCTIONS
For scheduling: Call Mónica at [phone]    For questions or concerns call: DEEPT[facility] MON-FRI 8 AM- 5PM [phone]. Radiology resident on call [phone].    For immediate concerns that are not emergent, you may call our radiology clinic at: [phone]

## 2017-11-28 NOTE — PROCEDURES
Radiology Post-Procedure Note    Pre Op Diagnosis: Ascites  Post Op Diagnosis: Same    Procedure: Ultrasound Guided Paracentesis    Procedure performed by: Moe HAYDEN, Afia     Written Informed Consent Obtained: Yes  Specimen Removed: YES yellow  Estimated Blood Loss: Minimal    Findings:   Successful paracentesis.  Albumin administered PRN per protocol.    Patient tolerated procedure well.    Afia Rosa, APRN, FNP  Interventional Radiology  [phone] spectral[employer]

## 2017-11-28 NOTE — PROGRESS NOTES
Paracentesis complete, 6.2 L removed, pt tolerates well.  Albumin adm per protocol  DSD applied to RLQ.  Dc instructions reviewed.

## 2017-11-29 ENCOUNTER — OFFICE VISIT (OUTPATIENT)
Dept: HEPATOLOGY | Facility: CLINIC | Age: 67
End: 2017-11-29
Payer: MEDICARE

## 2017-11-29 VITALS
SYSTOLIC BLOOD PRESSURE: 109 MMHG | BODY MASS INDEX: 21.77 KG/M2 | HEIGHT: 72 IN | OXYGEN SATURATION: 98 % | HEART RATE: 72 BPM | WEIGHT: 160.69 LBS | TEMPERATURE: 100 F | DIASTOLIC BLOOD PRESSURE: 69 MMHG

## 2017-11-29 DIAGNOSIS — E88.09 HYPOALBUMINEMIA: ICD-10-CM

## 2017-11-29 DIAGNOSIS — Z87.898 HISTORY OF ALCOHOL USE: ICD-10-CM

## 2017-11-29 DIAGNOSIS — E83.119 HEMOCHROMATOSIS, UNSPECIFIED HEMOCHROMATOSIS TYPE: ICD-10-CM

## 2017-11-29 DIAGNOSIS — K74.60 CIRRHOSIS OF LIVER WITHOUT ASCITES, UNSPECIFIED HEPATIC CIRRHOSIS TYPE: ICD-10-CM

## 2017-11-29 DIAGNOSIS — R18.8 OTHER ASCITES: ICD-10-CM

## 2017-11-29 DIAGNOSIS — D64.9 ANEMIA, UNSPECIFIED TYPE: ICD-10-CM

## 2017-11-29 DIAGNOSIS — R16.0 LIVER MASSES: Primary | ICD-10-CM

## 2017-11-29 PROCEDURE — 99214 OFFICE O/P EST MOD 30 MIN: CPT | Mod: PBBFAC | Performed by: NURSE PRACTITIONER

## 2017-11-29 PROCEDURE — 99999 PR PBB SHADOW E&M-EST. PATIENT-LVL IV: CPT | Mod: PBBFAC,,, | Performed by: NURSE PRACTITIONER

## 2017-11-29 PROCEDURE — 99214 OFFICE O/P EST MOD 30 MIN: CPT | Mod: S$PBB,,, | Performed by: NURSE PRACTITIONER

## 2017-11-29 RX ORDER — FUROSEMIDE 40 MG/1
100 TABLET ORAL DAILY
Qty: 75 TABLET | Refills: 1 | Status: SHIPPED | OUTPATIENT
Start: 2017-11-29

## 2017-11-29 RX ORDER — SPIRONOLACTONE 100 MG/1
300 TABLET, FILM COATED ORAL DAILY
Qty: 90 TABLET | Refills: 1 | Status: SHIPPED | OUTPATIENT
Start: 2017-11-29 | End: 2017-11-29 | Stop reason: SDUPTHER

## 2017-11-29 RX ORDER — SPIRONOLACTONE 100 MG/1
300 TABLET, FILM COATED ORAL DAILY
Qty: 90 TABLET | Refills: 1 | Status: SHIPPED | OUTPATIENT
Start: 2017-11-29 | End: 2017-11-30 | Stop reason: SDUPTHER

## 2017-11-29 NOTE — PROGRESS NOTES
[facility] Established Patient Visit    Reason for Visit:  F/u cirrhosis    PCP: Gilo Kawasaki    HPI:  This is a 66 y.o. 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 [facility]. 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 No copies of H63D.He denied drinking any alcohol in the past year but his PETH was (+) at 180 in 3/2017 indicating recent alcohol intake. PETH negative since then. His MELD was elevated at 16 on initial labs. We initiated clearance for liver transplant evaluation based on this. He has been unable to start evaluation though since he does not have any prescription drug coverage.     His cirrhosis has been decompensated with ascites. He is currently on lasix 60 mg and spironolactone 200 mg daily. He has also had multiple paracenteses. He is on lactulose for his constipation. Has not had hepatic encephalopathy.     We have also been following his liver masses. TPCT on 3/8 revealed 3 indeterminate liver lesions, all 1 cm or less, all hyperenhancing. Has had MRI's for further surveillance and was reviewed in IR conference in 4/2017, 7/2017, and again 10/2017 with recs for continued surveillance, indeterminate lesions.     Plan: 3 indeterminate lesions.  T1 bright on pre-contrast.  Poor quality subtraction images.  tpCT in 3 mos.  Request re-evaluation of financial status.       Orders entered and note forwarded to hepatology staff to coordinate follow-up.  Message sent to Michelle Schwartz, requesting re-evaluation of insurance coverage.       [facility] because [facility] had difficulty arranging this for him on the [facility]. At his last visit, his H/H was lower at 11.6/32.8. I recommended holding his phlebotomy. His labs from 11/27 show H/H better at 12.7/36.7. Ferritin lower at 356. [facility]. His MELD is down to 9 now. He has more frequent paracenteses recently,  every 7-10 days. He denies jaundice, dark urine, hematemesis, melena, slowed mentation. Weight is stable.     Today wife reports he was able to obtain Rx drug coverage but was denied supplemental insurance through her work. They are wondering what to do now to move forward with transplant evaluation.       ROS:   GENERAL: Denies fever, chills, weight change, (+) fatigue  HEENT: Denies headaches, dizziness, vision/hearing changes  CARDIOVASCULAR: Denies chest pain, palpitations, edema  RESPIRATORY: Denies dyspnea, cough  GI: Denies abdominal pain, no rectal bleeding, no nausea, vomiting.   : Denies dysuria, hematuria   SKIN: Denies rash, itching   NEURO: Denies confusion, memory loss, or mood changes  PSYCH: Denies depression or anxiety  HEME/LYMPH: Denies easy bruising or bleeding      PMHX:  has a past medical history of BPH (benign prostatic hyperplasia); Cirrhosis of liver with ascites (3/8/2017); Hemochromatosis; and Neuropathy.    PSHX:  has a past surgical history that includes Hernia repair; Colonoscopy (2012); Upper gastrointestinal endoscopy; and Umbilical hernia repair (06/14/2017).    The patient's social and family histories were reviewed by me and updated in the appropriate section of the electronic medical record.    Review of patient's allergies indicates:   Allergen Reactions    Sulfa (sulfonamide antibiotics) Nausea And Vomiting       Medications reviewed in Epic      Objective Findings:    PHYSICAL EXAM:   Chronically ill appearing friendly White male, in no acute distress; alert and oriented to person, place and time  VITALS: /69 (BP Location: Left arm, Patient Position: Sitting, BP Method: X-Large (Automatic))   Pulse 72   Temp 99.7 °F (37.6 °C) (Oral)   Ht 6' (1.829 m)   Wt 72.9 kg (160 lb 11.5 oz)   SpO2 98%   BMI 21.80 kg/m²   HENT: Normocephalic, without obvious abnormality. Oral mucosa pink and moist. Dentition fair. (+) temporal and facial muscle wasting.  EYES: Sclerae  mildly icteric.    CARDIOVASCULAR: Regular rate and rhythm. No murmurs.  RESPIRATORY: Normal respiratory effort. BBS CTA. No wheezes or crackles.  GI: Soft, non-tender, mildly distended. (+) hepatosplenomegaly. No masses palpable. No significant ascites.  EXTREMITIES: No clubbing, cyanosis, edema.  SKIN: Warm and dry. No jaundice. (+) dark skin tone. No rashes noted to exposed skin. No telangectasias noted. (+) palmar erythema.  NEURO: Normal gate. No asterixis.   PSYCH: Memory intact. Thought and speech pattern appropriate. Behavior normal. No depression or anxiety noted.       Labs:  Lab Results   Component Value Date    WBC 5.04 11/27/2017    HGB 12.7 (L) 11/27/2017    HCT 36.7 (L) 11/27/2017     (L) 11/27/2017     11/27/2017    K 4.4 11/27/2017    CREATININE 0.8 11/27/2017    ALT 17 11/27/2017    AST 39 11/27/2017    ALKPHOS 90 11/27/2017    BILITOT 1.2 (H) 11/27/2017    ALBUMIN 3.1 (L) 11/27/2017    INR 1.2 11/27/2017    AFP 4.6 09/20/2017     MRI 10/10/17  A large quantity of fluid is noted within the abdomen suggestive of ascites.    The liver appears small with an irregular contour suggestive of cirrhosis and is of decreased T2 signal as can be seen with multiple processes including heavy metal toxicity and cirrhosis.    The gallbladder wall is questioned to be thickened which can relate to liver disease, cholecystitis either chronic or acute. Please clinically correlate. If further evaluation is necessary ultrasound or HIDA nuclear medicine evaluation could be performed.    A hypodensity is noted at the upper pole of the right kidney of 8 mm without obvious evidence of enhancement suggestive of a cyst.    3  nodules appear to be present along the gallbladder fossa in segment 5, adjacent to the gallbladder one measures 8 mm and the second of 1.5 cm is noted further to the right. The third, further to the right is of 10 mm. These are best seen on sequence 1101 phase II images 54, 57 and 60. No  detrimental change is noted when comparison is made with the MRI 4/11/17. These demonstrate increased T1 signal precontrast. No convincing dropout is noted upon out of phase imaging. These are not obvious on T2 weighted imaging.  The smaller 2 are difficult to evaluate on 90 minute imaging period. The largest does not appear to demonstrate contrast retention and 90 minute imaging next      A nodule is noted within the left lobe the liver near the liver tip of 7 mm that appears enhancing as well, likely present and unchanged since 4/11/17 with a second of 7 mm as well appearing in segment 2 near the border of segment 2 and segment 3. This nodule appears stable since at least 7/7/17    The 3 segment 5 lesions in the lesion in the the left lobe on the right appear to be demonstrating some washout, the background T1 positivity precontrast however hinders ideal assessment    Some washout of contrast is suspected involving the 3 segment 5 lesions diffusely but this is somewhat equivocal given the background T1 shortening. Some washout of the lesion on the right within the left lobe is also suspected, this demonstrates no obvious T1 shortening precontrast. The lesion within the left lobe on the left near the tip demonstrates enhancement without significant washout during the early phases of the exam   Impression         1. Multiple enhancing T1 bright hepatic lesions at least 3 in segment 5 demonstrating T1 shortening prior to contrast administration and 2 nodules in the left lobe. None of these nodules appear convincingly detrimentally changed in size when comparison is made with the priors.     Some washout of contrast is suspected with less conspicuous lesions on later T1 postcontrast phases in the 3 segment 5 lesions and the left lobe lesion on the right. The left lobe lesion on the left near the tip is not obvious  T1 bright pre-contrast but does appear to enhance and is without obvious washout     No definitive  growth is noted of these lesions, no obvious enhancing capsule is noted in any of these lesions, washout is somewhat equivocal given background T1 positivity particularly in the segment 5 lesions. Overall these nodules have a intermediate probability of malignancy. Continued longer-term follow up is necessary    2. Decreased liver signal intensity on T2 as can be seen with hemachromatosis    3. Cirrhosis with ascites    4. Apparent gallbladder wall which may relate to liver disease and ascites, if gallbladder pathology is suspected and further evaluation possibly with a HIDA nuclear medicine exam could be performed         ASSESSMENT:  66 y.o. White male with:  1.  Cirrhosis, decompensated with ascites  -- MELD - MELD-Na score: 9 at 11/27/2017  9:43 AM  MELD score: 9 at 11/27/2017  9:43 AM  Calculated from:  Serum Creatinine: 0.8 mg/dL (Rounded to 1) at 11/27/2017  9:43 AM  Serum Sodium: 139 mmol/L (Rounded to 137) at 11/27/2017  9:43 AM  Total Bilirubin: 1.2 mg/dL at 11/27/2017  9:43 AM  INR(ratio): 1.2 at 11/27/2017  9:43 AM  Age: 66 years  -- HCC screening- AFP and abd. U/S - see below  -- (+) Immunity to Hep A and B per labs  -- EGD - 3/16/17 - no varices  2. Hemochromatosis  -- [facility]  -- [facility] by [facility]ologist there  -- HH DNA analysis- heterozygote for C282Y. No copies of H63D  -- last ferritin 356  3. Ascites  --  s/p multiple jacinda. On lasix 60 mg and spironolactone 200 mg daily. Will increase diuretics since labs have been stable and he has required more frequent jacinda Q 7-10 days now  4. Liver masses  -- TPCT 3/8/17 - 3 indeterminate liver lesions, all 1 cm or less, all hyperenhancing. One with washout on delayed phase.   -- MRI 4/11/17 - 3 indeterminant arterial enhancing liver lesions within hepatic segment V, no washout or pseudocapsule  -- MRI 7/7/17 - lesions are all stable in size and number, possibly may be regenerative nodules. No early arterial enhancing nodules seen  -- MRI  10/10/17 - lesions appear stable  -- reviewed in IR conference again 10/2017 - 3 indeterminate lesions, repeat TPCT in 3 months  -- AFP nl   5. H/o alcohol use  --  PETH (+) at initial visit 3/2017 but negative in 8/2017 and 9/2017  6. Hypoalbuminemia  -- recommended increased protein intake with protein shake 1-2 per day  7. Anemia, improved some  -- ok to do phlebotomy for now      EDUCATION:   Signs and symptoms of hepatic decompensation were reviewed, including jaundice, ascites, and slowed mentation due to hepatic encephalopathy. The patient should seek medical attention if any of these things occur.  We discussed the potential for bleeding from esophageal varices with symptoms of hematemesis and melena. The patient should report to the Emergency Department for these symptoms.    We discussed the increased risk of hepatocellular carcinoma due to cirrhosis. Continued screening every six months with ultrasound and AFP is recommended.     No alcohol or raw seafood.  Tylenol/acetaminophen as needed for pain, up to 2000 mg daily    Discussed use of the MELD score and its role in the management and determining the prognosis of cirrhosis. Discussed liver transplant evaluation process.        PLAN:  1. Increase lasix to 100 mg daily and spironolactone to 300 mg daily. Rx's sent  2. Weekly CMP x 4 weeks  3. TPCT in 1/2018 as scheduled for liver mass surveillance  4. Continue lactulose 30 cc BID-TID for constipation  5. [facility] for [facility]  6. Will have the  and  contact pt about his insurance coverage to try to move forward with transplant evaluation   7. Paracentesis prn. Recommend albumin infusion with each paracentesis  8. F/u with me in 1/2018 after CT done      Thank you for allowing me to participate in the care of Fernando Garg    Nasima Ro, OCTAVIOC

## 2017-11-30 DIAGNOSIS — K74.60 CIRRHOSIS OF LIVER WITHOUT ASCITES, UNSPECIFIED HEPATIC CIRRHOSIS TYPE: ICD-10-CM

## 2017-11-30 DIAGNOSIS — R18.8 OTHER ASCITES: ICD-10-CM

## 2017-11-30 RX ORDER — SPIRONOLACTONE 100 MG/1
300 TABLET, FILM COATED ORAL DAILY
Qty: 270 TABLET | Refills: 0 | Status: SHIPPED | OUTPATIENT
Start: 2017-11-30

## 2017-11-30 NOTE — TELEPHONE ENCOUNTER
[facility] pharmacy requesting 90 day refill on pts meds please advise thanks. Send back to pharmacy on file in chart.

## 2017-12-04 DIAGNOSIS — R18.8 OTHER ASCITES: Primary | ICD-10-CM

## 2017-12-06 ENCOUNTER — TELEPHONE (OUTPATIENT)
Dept: INTERVENTIONAL RADIOLOGY/VASCULAR | Facility: HOSPITAL | Age: 67
End: 2017-12-06

## 2017-12-07 ENCOUNTER — HOSPITAL ENCOUNTER (OUTPATIENT)
Dept: INTERVENTIONAL RADIOLOGY/VASCULAR | Facility: HOSPITAL | Age: 67
Discharge: HOME | End: 2017-12-07
Attending: NURSE PRACTITIONER
Payer: MEDICARE

## 2017-12-07 VITALS
SYSTOLIC BLOOD PRESSURE: 118 MMHG | DIASTOLIC BLOOD PRESSURE: 65 MMHG | HEART RATE: 72 BPM | OXYGEN SATURATION: 100 % | RESPIRATION RATE: 18 BRPM

## 2017-12-07 DIAGNOSIS — R18.8 OTHER ASCITES: ICD-10-CM

## 2017-12-07 LAB
APPEARANCE FLD: CLEAR
BODY FLD TYPE: NORMAL
COLOR FLD: YELLOW
LYMPHOCYTES NFR FLD MANUAL: 22 %
MONOS+MACROS NFR FLD MANUAL: 72 %
NEUTROPHILS NFR FLD MANUAL: 6 %
WBC # FLD: 114 /CU MM

## 2017-12-07 PROCEDURE — 89051 BODY FLUID CELL COUNT: CPT

## 2017-12-07 PROCEDURE — P9047 ALBUMIN (HUMAN), 25%, 50ML: HCPCS | Performed by: NURSE PRACTITIONER

## 2017-12-07 PROCEDURE — 49083 ABD PARACENTESIS W/IMAGING: CPT | Mod: ,,, | Performed by: FAMILY MEDICINE

## 2017-12-07 PROCEDURE — C1729 CATH, DRAINAGE: HCPCS

## 2017-12-07 PROCEDURE — 63600175 PHARM REV CODE 636 W HCPCS: Performed by: NURSE PRACTITIONER

## 2017-12-07 RX ORDER — ALBUMIN HUMAN 250 G/1000ML
37.5 SOLUTION INTRAVENOUS ONCE
Status: COMPLETED | OUTPATIENT
Start: 2017-12-07 | End: 2017-12-07

## 2017-12-07 RX ADMIN — ALBUMIN (HUMAN) 37.5 G: 25 SOLUTION INTRAVENOUS at 10:12

## 2017-12-07 NOTE — PROGRESS NOTES
Paracentesis completed, pt tolerated well. No apparent distress noted. 6.4 Liters removed from right abd, mepore applied CDI. Labs collected and sent. Albumin 150 ml given per protocol.  Discharge instructions reviewed and acknowledged. Pt discharged via ambulation, steady gait observed and private vehicle.

## 2017-12-07 NOTE — H&P
Radiology History & Physical      SUBJECTIVE:     Chief Complaint: ascites    History of Present Illness:  Fernando Garg is a 66 y.o. male who presents for ultrasound guided paracentesis  Past Medical History:   Diagnosis Date    BPH (benign prostatic hyperplasia)     Cirrhosis of liver with ascites 3/8/2017    Hemochromatosis     Neuropathy      Past Surgical History:   Procedure Laterality Date    COLONOSCOPY  2012    no polyps, repeat in 10 yrs, [facility]    HERNIA REPAIR      UMBILICAL HERNIA REPAIR  06/14/2017    UPPER GASTROINTESTINAL ENDOSCOPY         Home Meds:   Prior to Admission medications    Medication Sig Start Date End Date Taking? Authorizing Provider   clonazePAM (KLONOPIN) 0.5 MG tablet Take 0.5 mg by mouth 2 (two) times daily as needed for Anxiety.    Historical Provider, MD   furosemide (LASIX) 40 MG tablet Take 2.5 tablets (100 mg total) by mouth once daily. 11/29/17   Nasima Ro NP   gabapentin (NEURONTIN) 300 MG capsule Take 300 mg by mouth 3 (three) times daily.    Historical Provider, MD   HYDROmorphone (DILAUDID) 2 MG tablet Take 1 tablet (2 mg total) by mouth every 6 (six) hours as needed (Pain). 6/17/17   Felice Steiner MD   Lactobacillus rhamnosus GG (CULTURELLE) 10 billion cell capsule Take 1 capsule by mouth once daily.    Historical Provider, MD   lactulose (CHRONULAC) 20 gram/30 mL Soln Take 30 mLs (20 g total) by mouth 2 (two) times daily. Goal of 3-4 BM's daily 4/27/17 4/27/18  Nasima Ro NP   omeprazole (PRILOSEC) 20 MG capsule Take 1 capsule (20 mg total) by mouth once daily. 4/24/17 4/24/18  Nasima Ro NP   spironolactone (ALDACTONE) 100 MG tablet Take 3 tablets (300 mg total) by mouth once daily. 11/30/17   Nasima Ro NP     Anticoagulants/Antiplatelets: no anticoagulation    Allergies:   Review of patient's allergies indicates:   Allergen Reactions    Sulfa (sulfonamide antibiotics) Nausea And Vomiting     Sedation  History:  no adverse reactions    Review of Systems:   Hematological: no known coagulopathies  Respiratory: no shortness of breath  Cardiovascular: no chest pain  Gastrointestinal: no abdominal pain  Genito-Urinary: no dysuria  Musculoskeletal: negative  Neurological: no TIA or stroke symptoms         OBJECTIVE:     Vital Signs (Most Recent)  Pulse: 77 (12/07/17 0930)  Resp: 18 (12/07/17 0930)  BP: 121/76 (12/07/17 0930)  SpO2: 100 % (12/07/17 0930)    Physical Exam:  ASA: 2  Mallampati: n/a    General: no acute distress  Mental Status: alert and oriented to person, place and time  HEENT: normocephalic, atraumatic  Chest: unlabored breathing  Heart: regular heart rate  Abdomen: distended  Extremity: moves all extremities    Laboratory  Lab Results   Component Value Date    INR 1.2 11/27/2017       Lab Results   Component Value Date    WBC 5.04 11/27/2017    HGB 12.7 (L) 11/27/2017    HCT 36.7 (L) 11/27/2017     (H) 11/27/2017     (L) 11/27/2017      Lab Results   Component Value Date     11/27/2017     11/27/2017    K 4.4 11/27/2017     11/27/2017    CO2 26 11/27/2017    BUN 10 11/27/2017    CREATININE 0.8 11/27/2017    CALCIUM 9.3 11/27/2017    ALT 17 11/27/2017    AST 39 11/27/2017    ALBUMIN 3.1 (L) 11/27/2017    BILITOT 1.2 (H) 11/27/2017    BILIDIR 0.9 (H) 04/24/2017       ASSESSMENT/PLAN:     Sedation Plan: local  Patient will undergo ultrasound guided paracentesis.    SHELLY James, FNP  Interventional Radiology  [phone] [employer]

## 2017-12-07 NOTE — PROGRESS NOTES
Pt arrived to IR room 1247 for para, no acute distress noted. Orders and labs reviewed on chart. Awaiting consent.

## 2017-12-07 NOTE — PROCEDURES
Radiology Post-Procedure Note    Pre Op Diagnosis: Ascites  Post Op Diagnosis: Same    Procedure: Ultrasound Guided Paracentesis    Procedure performed by: Moe HAYDEN, Afia     Written Informed Consent Obtained: Yes  Specimen Removed: YES clear yellow  Estimated Blood Loss: Minimal    Findings:   Successful paracentesis.  Albumin administered PRN per protocol.    Patient tolerated procedure well.    Afia Rosa, APRN, FNP  Interventional Radiology  [phone] [employer]

## 2017-12-18 ENCOUNTER — HOSPITAL ENCOUNTER (OUTPATIENT)
Dept: INTERVENTIONAL RADIOLOGY/VASCULAR | Facility: HOSPITAL | Age: 67
Discharge: HOME | End: 2017-12-18
Attending: NURSE PRACTITIONER
Payer: MEDICARE

## 2017-12-18 VITALS — SYSTOLIC BLOOD PRESSURE: 131 MMHG | OXYGEN SATURATION: 99 % | DIASTOLIC BLOOD PRESSURE: 76 MMHG | HEART RATE: 71 BPM

## 2017-12-18 DIAGNOSIS — R18.8 OTHER ASCITES: ICD-10-CM

## 2017-12-18 LAB
APPEARANCE FLD: NORMAL
BODY FLD TYPE: NORMAL
COLOR FLD: YELLOW
LYMPHOCYTES NFR FLD MANUAL: 54 %
MESOTHL CELL NFR FLD MANUAL: 4 %
MONOS+MACROS NFR FLD MANUAL: 38 %
NEUTROPHILS NFR FLD MANUAL: 4 %
WBC # FLD: 93 /CU MM

## 2017-12-18 PROCEDURE — P9047 ALBUMIN (HUMAN), 25%, 50ML: HCPCS | Performed by: NURSE PRACTITIONER

## 2017-12-18 PROCEDURE — 49083 ABD PARACENTESIS W/IMAGING: CPT | Mod: ,,, | Performed by: FAMILY MEDICINE

## 2017-12-18 PROCEDURE — C1729 CATH, DRAINAGE: HCPCS

## 2017-12-18 PROCEDURE — 63600175 PHARM REV CODE 636 W HCPCS: Performed by: NURSE PRACTITIONER

## 2017-12-18 PROCEDURE — 89051 BODY FLUID CELL COUNT: CPT

## 2017-12-18 RX ORDER — ALBUMIN HUMAN 250 G/1000ML
25 SOLUTION INTRAVENOUS ONCE
Status: COMPLETED | OUTPATIENT
Start: 2017-12-18 | End: 2017-12-18

## 2017-12-18 RX ADMIN — ALBUMIN (HUMAN) 25 G: 25 SOLUTION INTRAVENOUS at 11:12

## 2017-12-18 NOTE — H&P
Radiology History & Physical      SUBJECTIVE:     Chief Complaint: ascites    History of Present Illness:  Fernando Garg is a 66 y.o. male who presents for ultrasound guided paracentesis  Past Medical History:   Diagnosis Date    BPH (benign prostatic hyperplasia)     Cirrhosis of liver with ascites 3/8/2017    Hemochromatosis     Neuropathy      Past Surgical History:   Procedure Laterality Date    COLONOSCOPY  2012    no polyps, repeat in 10 yrs, [facility]    HERNIA REPAIR      UMBILICAL HERNIA REPAIR  06/14/2017    UPPER GASTROINTESTINAL ENDOSCOPY         Home Meds:   Prior to Admission medications    Medication Sig Start Date End Date Taking? Authorizing Provider   clonazePAM (KLONOPIN) 0.5 MG tablet Take 0.5 mg by mouth 2 (two) times daily as needed for Anxiety.    Historical Provider, MD   furosemide (LASIX) 40 MG tablet Take 2.5 tablets (100 mg total) by mouth once daily. 11/29/17   Nasima Ro NP   gabapentin (NEURONTIN) 300 MG capsule Take 300 mg by mouth 3 (three) times daily.    Historical Provider, MD   HYDROmorphone (DILAUDID) 2 MG tablet Take 1 tablet (2 mg total) by mouth every 6 (six) hours as needed (Pain). 6/17/17   Felice Steiner MD   Lactobacillus rhamnosus GG (CULTURELLE) 10 billion cell capsule Take 1 capsule by mouth once daily.    Historical Provider, MD   lactulose (CHRONULAC) 20 gram/30 mL Soln Take 30 mLs (20 g total) by mouth 2 (two) times daily. Goal of 3-4 BM's daily 4/27/17 4/27/18  Nasima Ro NP   omeprazole (PRILOSEC) 20 MG capsule Take 1 capsule (20 mg total) by mouth once daily. 4/24/17 4/24/18  Nasima Ro NP   spironolactone (ALDACTONE) 100 MG tablet Take 3 tablets (300 mg total) by mouth once daily. 11/30/17   Nasima Ro NP     Anticoagulants/Antiplatelets: no anticoagulation    Allergies:   Review of patient's allergies indicates:   Allergen Reactions    Sulfa (sulfonamide antibiotics) Nausea And Vomiting     Sedation  History:  no adverse reactions    Review of Systems:   Hematological: no known coagulopathies  Respiratory: no shortness of breath  Cardiovascular: no chest pain  Gastrointestinal: no abdominal pain  Genito-Urinary: no dysuria  Musculoskeletal: negative  Neurological: no TIA or stroke symptoms         OBJECTIVE:     Vital Signs (Most Recent)       Physical Exam:  ASA: 2  Mallampati: n/a    General: no acute distress  Mental Status: alert and oriented to person, place and time  HEENT: normocephalic, atraumatic  Chest: unlabored breathing  Heart: regular heart rate  Abdomen: distended  Extremity: moves all extremities    Laboratory  Lab Results   Component Value Date    INR 1.2 11/27/2017       Lab Results   Component Value Date    WBC 5.04 11/27/2017    HGB 12.7 (L) 11/27/2017    HCT 36.7 (L) 11/27/2017     (H) 11/27/2017     (L) 11/27/2017      Lab Results   Component Value Date     11/27/2017     11/27/2017    K 4.4 11/27/2017     11/27/2017    CO2 26 11/27/2017    BUN 10 11/27/2017    CREATININE 0.8 11/27/2017    CALCIUM 9.3 11/27/2017    ALT 17 11/27/2017    AST 39 11/27/2017    ALBUMIN 3.1 (L) 11/27/2017    BILITOT 1.2 (H) 11/27/2017    BILIDIR 0.9 (H) 04/24/2017       ASSESSMENT/PLAN:     Sedation Plan: local  Patient will undergo ultrasound guided paracentesis.    SHELLY James, FNP  Interventional Radiology  [phone] [employer]

## 2017-12-18 NOTE — PROCEDURES
Radiology Post-Procedure Note    Pre Op Diagnosis: Ascites  Post Op Diagnosis: Same    Procedure: Ultrasound Guided Paracentesis    Procedure performed by: Moe HAYDEN, Afia     Written Informed Consent Obtained: Yes  Specimen Removed: YES yellow  Estimated Blood Loss: Minimal    Findings:   Successful paracentesis.  Albumin administered PRN per protocol.    Patient tolerated procedure well.    Afia Rosa, APRN, FNP  Interventional Radiology  [phone] [employer]

## 2017-12-18 NOTE — PROGRESS NOTES
Patient ambulated from room 124, tolerated procedure well, patient did not want paper copy of discharge instructions, understood verbal discharge instructions

## 2017-12-27 ENCOUNTER — TELEPHONE (OUTPATIENT)
Dept: INTERVENTIONAL RADIOLOGY/VASCULAR | Facility: HOSPITAL | Age: 67
End: 2017-12-27

## 2017-12-28 ENCOUNTER — HOSPITAL ENCOUNTER (OUTPATIENT)
Dept: INTERVENTIONAL RADIOLOGY/VASCULAR | Facility: HOSPITAL | Age: 67
Discharge: HOME | End: 2017-12-28
Attending: NURSE PRACTITIONER
Payer: MEDICARE

## 2017-12-28 VITALS
HEART RATE: 69 BPM | SYSTOLIC BLOOD PRESSURE: 116 MMHG | RESPIRATION RATE: 17 BRPM | DIASTOLIC BLOOD PRESSURE: 73 MMHG | OXYGEN SATURATION: 97 %

## 2017-12-28 DIAGNOSIS — R18.8 OTHER ASCITES: ICD-10-CM

## 2017-12-28 LAB
APPEARANCE FLD: NORMAL
BODY FLD TYPE: NORMAL
COLOR FLD: YELLOW
LYMPHOCYTES NFR FLD MANUAL: 26 %
MESOTHL CELL NFR FLD MANUAL: 13 %
MONOS+MACROS NFR FLD MANUAL: 50 %
NEUTROPHILS NFR FLD MANUAL: 11 %
WBC # FLD: 167 /CU MM

## 2017-12-28 PROCEDURE — A7048 VACUUM DRAIN BOTTLE/TUBE KIT: HCPCS

## 2017-12-28 PROCEDURE — C1729 CATH, DRAINAGE: HCPCS

## 2017-12-28 PROCEDURE — 89051 BODY FLUID CELL COUNT: CPT

## 2017-12-28 PROCEDURE — 49083 ABD PARACENTESIS W/IMAGING: CPT | Mod: ,,, | Performed by: FAMILY MEDICINE

## 2017-12-28 PROCEDURE — 87070 CULTURE OTHR SPECIMN AEROBIC: CPT

## 2017-12-28 NOTE — PROCEDURES
Radiology Post-Procedure Note    Pre Op Diagnosis: Ascites  Post Op Diagnosis: Same    Procedure: Ultrasound Guided Paracentesis    Procedure performed by: Moe HAYDEN, Afia     Written Informed Consent Obtained: Yes  Specimen Removed: YES clear yellow  Estimated Blood Loss: Minimal    Findings:   Successful paracentesis.  Albumin administered PRN per protocol.    Patient tolerated procedure well.    Afia Rosa, APRN, FNP  Interventional Radiology  [phone] [employer]

## 2018-01-02 LAB — BACTERIA SPEC AEROBE CULT: NO GROWTH

## 2018-01-08 ENCOUNTER — HOSPITAL ENCOUNTER (OUTPATIENT)
Dept: INTERVENTIONAL RADIOLOGY/VASCULAR | Facility: HOSPITAL | Age: 68
Discharge: HOME | End: 2018-01-08
Attending: NURSE PRACTITIONER
Payer: MEDICARE

## 2018-01-08 VITALS
OXYGEN SATURATION: 100 % | RESPIRATION RATE: 18 BRPM | SYSTOLIC BLOOD PRESSURE: 111 MMHG | HEART RATE: 76 BPM | DIASTOLIC BLOOD PRESSURE: 65 MMHG

## 2018-01-08 DIAGNOSIS — R18.8 OTHER ASCITES: ICD-10-CM

## 2018-01-08 LAB
APPEARANCE FLD: CLEAR
BODY FLD TYPE: NORMAL
COLOR FLD: YELLOW
LYMPHOCYTES NFR FLD MANUAL: 34 %
MESOTHL CELL NFR FLD MANUAL: 9 %
MONOS+MACROS NFR FLD MANUAL: 46 %
NEUTROPHILS NFR FLD MANUAL: 11 %
WBC # FLD: 89 /CU MM

## 2018-01-08 PROCEDURE — 49083 ABD PARACENTESIS W/IMAGING: CPT | Mod: ,,, | Performed by: FAMILY MEDICINE

## 2018-01-08 PROCEDURE — P9047 ALBUMIN (HUMAN), 25%, 50ML: HCPCS | Mod: JG | Performed by: FAMILY MEDICINE

## 2018-01-08 PROCEDURE — 89051 BODY FLUID CELL COUNT: CPT

## 2018-01-08 PROCEDURE — 49083 ABD PARACENTESIS W/IMAGING: CPT

## 2018-01-08 PROCEDURE — 63600175 PHARM REV CODE 636 W HCPCS: Mod: JG | Performed by: FAMILY MEDICINE

## 2018-01-08 RX ORDER — ALBUMIN HUMAN 250 G/1000ML
25 SOLUTION INTRAVENOUS
Status: DISCONTINUED | OUTPATIENT
Start: 2018-01-08 | End: 2018-01-09 | Stop reason: HOSPADM

## 2018-01-08 RX ADMIN — ALBUMIN (HUMAN) 25 G: 25 SOLUTION INTRAVENOUS at 11:01

## 2018-01-08 NOTE — PROGRESS NOTES
Paracentesis complete, 7500MLs removed. Specimen sent to lab. Albumin administered per protocol. Dressing applied to RLQ puncture site, dressing clean dry and intact. Pt given discharge instructions and handouts, pt verbalizes understanding. Questions answered. Pt denies pain and discomfort. Pt refusing transport. Pt ambulated to [facility] for transport home with family. Gait steady.

## 2018-01-08 NOTE — PROCEDURES
Radiology Post-Procedure Note    Pre Op Diagnosis: Ascites  Post Op Diagnosis: Same    Procedure: Ultrasound Guided Paracentesis    Procedure performed by: Moe HAYDEN, Afia     Written Informed Consent Obtained: Yes  Specimen Removed: YES clear yellow  Estimated Blood Loss: Minimal    Findings:   Successful paracentesis.  Albumin administered PRN per protocol.    Patient tolerated procedure well.    Afia Rosa, APRN, FNP  Interventional Radiology  [phone] [employer]

## 2018-01-08 NOTE — H&P
Radiology History & Physical      SUBJECTIVE:     Chief Complaint: ascites    History of Present Illness:  Fernando Garg is a 67 y.o. male who presents for ultrasound guided paracentesis  Past Medical History:   Diagnosis Date    BPH (benign prostatic hyperplasia)     Cirrhosis of liver with ascites 3/8/2017    Hemochromatosis     Neuropathy      Past Surgical History:   Procedure Laterality Date    COLONOSCOPY  2012    no polyps, repeat in 10 yrs, [facility]    HERNIA REPAIR      UMBILICAL HERNIA REPAIR  06/14/2017    UPPER GASTROINTESTINAL ENDOSCOPY         Home Meds:   Prior to Admission medications    Medication Sig Start Date End Date Taking? Authorizing Provider   clonazePAM (KLONOPIN) 0.5 MG tablet Take 0.5 mg by mouth 2 (two) times daily as needed for Anxiety.    Historical Provider, MD   furosemide (LASIX) 40 MG tablet Take 2.5 tablets (100 mg total) by mouth once daily. 11/29/17   Nasima Ro NP   gabapentin (NEURONTIN) 300 MG capsule Take 300 mg by mouth 3 (three) times daily.    Historical Provider, MD   HYDROmorphone (DILAUDID) 2 MG tablet Take 1 tablet (2 mg total) by mouth every 6 (six) hours as needed (Pain). 6/17/17   Felice Steiner MD   Lactobacillus rhamnosus GG (CULTURELLE) 10 billion cell capsule Take 1 capsule by mouth once daily.    Historical Provider, MD   lactulose (CHRONULAC) 20 gram/30 mL Soln Take 30 mLs (20 g total) by mouth 2 (two) times daily. Goal of 3-4 BM's daily 4/27/17 4/27/18  Nasima Ro NP   omeprazole (PRILOSEC) 20 MG capsule Take 1 capsule (20 mg total) by mouth once daily. 4/24/17 4/24/18  Nasima Ro NP   spironolactone (ALDACTONE) 100 MG tablet Take 3 tablets (300 mg total) by mouth once daily. 11/30/17   Nasima Ro NP     Anticoagulants/Antiplatelets: no anticoagulation    Allergies:   Review of patient's allergies indicates:   Allergen Reactions    Sulfa (sulfonamide antibiotics) Nausea And Vomiting     Sedation  History:  no adverse reactions    Review of Systems:   Hematological: no known coagulopathies  Respiratory: no shortness of breath  Cardiovascular: no chest pain  Gastrointestinal: no abdominal pain  Genito-Urinary: no dysuria  Musculoskeletal: negative  Neurological: no TIA or stroke symptoms         OBJECTIVE:     Vital Signs (Most Recent)  Pulse: 64 (01/08/18 1024)  Resp: 18 (01/08/18 1024)  BP: (!) 109/59 (01/08/18 1024)  SpO2: 99 % (01/08/18 1024)    Physical Exam:  ASA: 2  Mallampati: n/a    General: no acute distress  Mental Status: alert and oriented to person, place and time  HEENT: normocephalic, atraumatic  Chest: unlabored breathing  Heart: regular heart rate  Abdomen: distended  Extremity: moves all extremities    Laboratory  Lab Results   Component Value Date    INR 1.2 12/28/2017       Lab Results   Component Value Date    WBC 4.32 12/28/2017    HGB 13.2 (L) 12/28/2017    HCT 37.2 (L) 12/28/2017     (H) 12/28/2017     (L) 12/28/2017      Lab Results   Component Value Date     11/27/2017     11/27/2017    K 4.4 11/27/2017     11/27/2017    CO2 26 11/27/2017    BUN 10 11/27/2017    CREATININE 0.8 11/27/2017    CALCIUM 9.3 11/27/2017    ALT 17 11/27/2017    AST 39 11/27/2017    ALBUMIN 3.1 (L) 11/27/2017    BILITOT 1.2 (H) 11/27/2017    BILIDIR 0.9 (H) 04/24/2017       ASSESSMENT/PLAN:     Sedation Plan: local  Patient will undergo ultrasound guided paracentesis.    SHELLY James, FNP  Interventional Radiology  [phone] [employer]

## 2018-01-24 ENCOUNTER — TELEPHONE (OUTPATIENT)
Dept: HEPATOLOGY | Facility: CLINIC | Age: 68
End: 2018-01-24

## 2018-01-24 NOTE — TELEPHONE ENCOUNTER
MA called pt at his preferred # and left voicemail for him to call us back to reschedule his F/U w/MsBlanche Nasima.  - Cancelled his current appt for 01/26/18.    JUDI

## 2018-04-03 ENCOUNTER — TELEPHONE (OUTPATIENT)
Dept: HEPATOLOGY | Facility: CLINIC | Age: 68
End: 2018-04-03

## 2018-04-03 NOTE — TELEPHONE ENCOUNTER
MA called patient spoke to patient wife she said that patient lost his Medicaid he cannot come here anymore. Offer her [facility] financial assistance program she is interested I have mailed her the 5 pages application for it.

## 2022-04-02 NOTE — TELEPHONE ENCOUNTER
MELD = 16 on labs today. Will initiate liver transplant referral. Discussed with Dr. Breaux and he recommends to start liver transplant evaluation.    Pt notified.   
normal...